# Patient Record
Sex: MALE | Race: WHITE | NOT HISPANIC OR LATINO | Employment: FULL TIME | URBAN - METROPOLITAN AREA
[De-identification: names, ages, dates, MRNs, and addresses within clinical notes are randomized per-mention and may not be internally consistent; named-entity substitution may affect disease eponyms.]

---

## 2023-04-24 ENCOUNTER — HOSPITAL ENCOUNTER (OUTPATIENT)
Dept: DATA CONVERSION | Facility: HOSPITAL | Age: 38
End: 2023-04-24
Attending: INTERNAL MEDICINE
Payer: COMMERCIAL

## 2023-04-24 DIAGNOSIS — K85.90 ACUTE PANCREATITIS WITHOUT NECROSIS OR INFECTION, UNSPECIFIED (HHS-HCC): ICD-10-CM

## 2023-04-24 DIAGNOSIS — E66.9 OBESITY, UNSPECIFIED: ICD-10-CM

## 2023-04-24 DIAGNOSIS — F32.A DEPRESSION, UNSPECIFIED: ICD-10-CM

## 2023-04-24 DIAGNOSIS — I10 ESSENTIAL (PRIMARY) HYPERTENSION: ICD-10-CM

## 2023-04-24 DIAGNOSIS — M51.16 INTERVERTEBRAL DISC DISORDERS WITH RADICULOPATHY, LUMBAR REGION: ICD-10-CM

## 2023-04-24 DIAGNOSIS — R10.13 EPIGASTRIC PAIN: ICD-10-CM

## 2023-04-24 DIAGNOSIS — F41.9 ANXIETY DISORDER, UNSPECIFIED: ICD-10-CM

## 2023-04-24 DIAGNOSIS — F17.200 NICOTINE DEPENDENCE, UNSPECIFIED, UNCOMPLICATED: ICD-10-CM

## 2023-04-24 DIAGNOSIS — G47.33 OBSTRUCTIVE SLEEP APNEA (ADULT) (PEDIATRIC): ICD-10-CM

## 2023-04-24 DIAGNOSIS — E11.9 TYPE 2 DIABETES MELLITUS WITHOUT COMPLICATIONS (MULTI): ICD-10-CM

## 2023-04-24 DIAGNOSIS — Z79.84 LONG TERM (CURRENT) USE OF ORAL HYPOGLYCEMIC DRUGS: ICD-10-CM

## 2023-04-24 LAB
POCT GLUCOSE: 124 MG/DL (ref 74–99)
POCT GLUCOSE: 153 MG/DL (ref 74–99)

## 2023-05-02 LAB
COMPLETE PATHOLOGY REPORT: NORMAL
CONVERTED CLINICAL DIAGNOSIS-HISTORY: NORMAL
CONVERTED FINAL DIAGNOSIS: NORMAL
CONVERTED FINAL REPORT PDF LINK TO COPY AND PASTE: NORMAL
CONVERTED GROSS DESCRIPTION: NORMAL

## 2023-06-06 LAB
ALBUMIN (G/DL) IN SER/PLAS: 4.5 G/DL (ref 3.4–5)
ALBUMIN (MG/L) IN URINE: 13.1 MG/L
ALBUMIN/CREATININE (UG/MG) IN URINE: 15.2 UG/MG CRT (ref 0–30)
ANION GAP IN SER/PLAS: 14 MMOL/L (ref 10–20)
CALCIUM (MG/DL) IN SER/PLAS: 10.1 MG/DL (ref 8.6–10.6)
CARBON DIOXIDE, TOTAL (MMOL/L) IN SER/PLAS: 27 MMOL/L (ref 21–32)
CHLORIDE (MMOL/L) IN SER/PLAS: 100 MMOL/L (ref 98–107)
CHOLESTEROL (MG/DL) IN SER/PLAS: 217 MG/DL (ref 0–199)
CHOLESTEROL IN HDL (MG/DL) IN SER/PLAS: 36.4 MG/DL
CHOLESTEROL/HDL RATIO: 6
CREATININE (MG/DL) IN SER/PLAS: 0.66 MG/DL (ref 0.5–1.3)
CREATININE (MG/DL) IN URINE: 86.3 MG/DL (ref 20–370)
ESTIMATED AVERAGE GLUCOSE FOR HBA1C: 183 MG/DL
GFR MALE: >90 ML/MIN/1.73M2
GLUCOSE (MG/DL) IN SER/PLAS: 119 MG/DL (ref 74–99)
HEMOGLOBIN A1C/HEMOGLOBIN TOTAL IN BLOOD: 8 %
LDL: 146 MG/DL (ref 0–99)
PHOSPHATE (MG/DL) IN SER/PLAS: 3.8 MG/DL (ref 2.5–4.9)
POTASSIUM (MMOL/L) IN SER/PLAS: 4.5 MMOL/L (ref 3.5–5.3)
SODIUM (MMOL/L) IN SER/PLAS: 136 MMOL/L (ref 136–145)
TRIGLYCERIDE (MG/DL) IN SER/PLAS: 174 MG/DL (ref 0–149)
UREA NITROGEN (MG/DL) IN SER/PLAS: 19 MG/DL (ref 6–23)
VLDL: 35 MG/DL (ref 0–40)

## 2023-06-13 ENCOUNTER — OFFICE VISIT (OUTPATIENT)
Dept: PRIMARY CARE | Facility: CLINIC | Age: 38
End: 2023-06-13
Payer: COMMERCIAL

## 2023-06-13 VITALS
OXYGEN SATURATION: 98 % | DIASTOLIC BLOOD PRESSURE: 78 MMHG | WEIGHT: 315 LBS | HEART RATE: 98 BPM | HEIGHT: 75 IN | BODY MASS INDEX: 39.17 KG/M2 | SYSTOLIC BLOOD PRESSURE: 118 MMHG

## 2023-06-13 DIAGNOSIS — E11.9 DM TYPE 2, NOT AT GOAL (MULTI): ICD-10-CM

## 2023-06-13 DIAGNOSIS — Z00.00 ANNUAL PHYSICAL EXAM: Primary | ICD-10-CM

## 2023-06-13 DIAGNOSIS — F17.210 CIGARETTE SMOKER: ICD-10-CM

## 2023-06-13 DIAGNOSIS — F41.9 ANXIETY: ICD-10-CM

## 2023-06-13 PROCEDURE — 90677 PCV20 VACCINE IM: CPT | Performed by: FAMILY MEDICINE

## 2023-06-13 PROCEDURE — 90471 IMMUNIZATION ADMIN: CPT | Performed by: FAMILY MEDICINE

## 2023-06-13 PROCEDURE — 3052F HG A1C>EQUAL 8.0%<EQUAL 9.0%: CPT | Performed by: FAMILY MEDICINE

## 2023-06-13 PROCEDURE — 3074F SYST BP LT 130 MM HG: CPT | Performed by: FAMILY MEDICINE

## 2023-06-13 PROCEDURE — 4010F ACE/ARB THERAPY RXD/TAKEN: CPT | Performed by: FAMILY MEDICINE

## 2023-06-13 PROCEDURE — 3008F BODY MASS INDEX DOCD: CPT | Performed by: FAMILY MEDICINE

## 2023-06-13 PROCEDURE — 90472 IMMUNIZATION ADMIN EACH ADD: CPT | Performed by: FAMILY MEDICINE

## 2023-06-13 PROCEDURE — 99395 PREV VISIT EST AGE 18-39: CPT | Performed by: FAMILY MEDICINE

## 2023-06-13 PROCEDURE — 3078F DIAST BP <80 MM HG: CPT | Performed by: FAMILY MEDICINE

## 2023-06-13 PROCEDURE — 90715 TDAP VACCINE 7 YRS/> IM: CPT | Performed by: FAMILY MEDICINE

## 2023-06-13 RX ORDER — INSULIN ASPART 100 [IU]/ML
INJECTION, SOLUTION INTRAVENOUS; SUBCUTANEOUS
COMMUNITY
Start: 2023-04-17 | End: 2024-02-21 | Stop reason: ENTERED-IN-ERROR

## 2023-06-13 RX ORDER — PEN NEEDLE, DIABETIC 29 G X1/2"
NEEDLE, DISPOSABLE MISCELLANEOUS
COMMUNITY
Start: 2022-11-30 | End: 2024-02-21 | Stop reason: ENTERED-IN-ERROR

## 2023-06-13 RX ORDER — LORAZEPAM 1 MG/1
1 TABLET ORAL 2 TIMES DAILY PRN
COMMUNITY
Start: 2022-07-01 | End: 2024-02-21 | Stop reason: ENTERED-IN-ERROR

## 2023-06-13 RX ORDER — BLOOD-GLUCOSE SENSOR
EACH MISCELLANEOUS
COMMUNITY
Start: 2023-06-06 | End: 2023-06-13

## 2023-06-13 RX ORDER — BLOOD-GLUCOSE METER
EACH MISCELLANEOUS
COMMUNITY
Start: 2021-08-11 | End: 2024-02-21 | Stop reason: ENTERED-IN-ERROR

## 2023-06-13 RX ORDER — PROPRANOLOL HYDROCHLORIDE 80 MG/1
TABLET ORAL
COMMUNITY
Start: 2023-02-26 | End: 2024-03-05 | Stop reason: WASHOUT

## 2023-06-13 RX ORDER — METFORMIN HYDROCHLORIDE 500 MG/1
500 TABLET, EXTENDED RELEASE ORAL 2 TIMES DAILY
COMMUNITY
Start: 2023-06-06 | End: 2023-06-13 | Stop reason: DRUGHIGH

## 2023-06-13 RX ORDER — EMPAGLIFLOZIN 10 MG/1
1 TABLET, FILM COATED ORAL DAILY
COMMUNITY
Start: 2023-01-31 | End: 2023-10-10 | Stop reason: SDUPTHER

## 2023-06-13 RX ORDER — BUPROPION HYDROCHLORIDE 150 MG/1
150 TABLET ORAL
COMMUNITY
Start: 2023-05-30 | End: 2023-06-13 | Stop reason: SDUPTHER

## 2023-06-13 RX ORDER — BUPROPION HYDROCHLORIDE 300 MG/1
300 TABLET ORAL
Qty: 90 TABLET | Refills: 3 | Status: ON HOLD | OUTPATIENT
Start: 2023-06-13 | End: 2024-02-25 | Stop reason: SDUPTHER

## 2023-06-13 RX ORDER — METFORMIN HYDROCHLORIDE 500 MG/1
1000 TABLET, EXTENDED RELEASE ORAL DAILY
Qty: 180 TABLET | Refills: 3 | Status: SHIPPED | OUTPATIENT
Start: 2023-06-13 | End: 2023-10-10 | Stop reason: SDUPTHER

## 2023-06-13 RX ORDER — LOSARTAN POTASSIUM 50 MG/1
50 TABLET ORAL DAILY
COMMUNITY
End: 2024-02-05 | Stop reason: SDUPTHER

## 2023-06-13 RX ORDER — SEMAGLUTIDE 1.34 MG/ML
1 INJECTION, SOLUTION SUBCUTANEOUS
COMMUNITY
Start: 2023-06-07 | End: 2023-10-03 | Stop reason: SDUPTHER

## 2023-06-13 ASSESSMENT — PAIN SCALES - GENERAL: PAINLEVEL: 6

## 2023-06-13 NOTE — PROGRESS NOTES
"Subjective   Patient ID: Glenroy Parker is a 37 y.o. male who presents for Annual Exam (Annual physical exam, pt is fasting. ).    HPI   Patient here for annual checkup.  He unfortunately continues to smoke. Nicotine patches in past.  Patient is seeing endocrine for his diabetes.  Last A1c 8.0.  He is not exercising regularly.  He has not had any flareups of his pancreatitis.  Review of Systems    Objective   /78 (BP Location: Left arm, Patient Position: Sitting, BP Cuff Size: Adult)   Pulse 98   Ht 1.905 m (6' 3\")   Wt (!) 150 kg (331 lb 9.6 oz)   SpO2 98%   BMI 41.45 kg/m²     Physical Exam  Alert, pleasant and in no acute distress.  Neck: Supple, no JVD or carotid bruit  Heart: Regular rate and rhythm, no murmur  Lungs: Clear to auscultation  Lower extremities: No edema  Abdomen: Soft, nontender without palpable mass    Assessment/Plan   Problem List Items Addressed This Visit    None  Visit Diagnoses       Annual physical exam    -  Primary    Anxiety        Relevant Medications    buPROPion XL (Wellbutrin XL) 300 mg 24 hr tablet    DM type 2, not at goal (CMS/[facility])        Cigarette smoker            Patient here for annual checkup.  He continues to struggle with smoking, weight loss and his diabetes.  We will increase his bupropion to 300 mg daily.  He can also utilize nicotine patches to quit.  If this fails, we can look at trying Chantix which she has tried in the past.  He had some side effects from this so we would have to dose very slowly.  Past blood work reviewed in detail.  Tdap and pneumococcal vaccines provided       "

## 2023-09-01 PROBLEM — I10 BENIGN ESSENTIAL HYPERTENSION: Status: ACTIVE | Noted: 2023-09-01

## 2023-09-01 PROBLEM — M51.16 DISPLACEMENT OF LUMBAR INTERVERTEBRAL DISC WITH RADICULOPATHY: Status: ACTIVE | Noted: 2023-09-01

## 2023-09-01 PROBLEM — H52.03 HYPERMETROPIA OF BOTH EYES: Status: ACTIVE | Noted: 2023-09-01

## 2023-09-01 PROBLEM — Z04.9 CONDITION NOT FOUND: Status: ACTIVE | Noted: 2023-09-01

## 2023-09-01 PROBLEM — E11.9 DIABETES MELLITUS, TYPE 2 (MULTI): Status: ACTIVE | Noted: 2023-09-01

## 2023-09-01 PROBLEM — R13.10 DYSPHAGIA: Status: ACTIVE | Noted: 2023-09-01

## 2023-09-01 PROBLEM — R73.09 ELEVATED GLUCOSE: Status: ACTIVE | Noted: 2023-09-01

## 2023-09-01 PROBLEM — R51.9 HEADACHE: Status: ACTIVE | Noted: 2023-09-01

## 2023-09-01 PROBLEM — G47.33 OSA (OBSTRUCTIVE SLEEP APNEA): Status: ACTIVE | Noted: 2023-09-01

## 2023-09-01 PROBLEM — S20.219A: Status: ACTIVE | Noted: 2023-09-01

## 2023-09-01 PROBLEM — M54.16 LUMBAR RADICULITIS: Status: ACTIVE | Noted: 2023-09-01

## 2023-09-01 PROBLEM — R60.0 BILATERAL LOWER EXTREMITY EDEMA: Status: ACTIVE | Noted: 2023-09-01

## 2023-09-01 PROBLEM — H70.91 MASTOIDITIS OF RIGHT SIDE: Status: ACTIVE | Noted: 2023-09-01

## 2023-09-01 PROBLEM — H69.93 DYSFUNCTION OF BOTH EUSTACHIAN TUBES: Status: ACTIVE | Noted: 2023-09-01

## 2023-09-01 PROBLEM — M51.26 HERNIATED NUCLEUS PULPOSUS, L4-5 LEFT: Status: ACTIVE | Noted: 2023-09-01

## 2023-09-01 PROBLEM — L03.116 CELLULITIS OF LEG, LEFT: Status: ACTIVE | Noted: 2023-09-01

## 2023-09-01 PROBLEM — I10 HTN (HYPERTENSION): Status: ACTIVE | Noted: 2023-09-01

## 2023-09-01 PROBLEM — K21.9 GASTROESOPHAGEAL REFLUX DISEASE: Status: ACTIVE | Noted: 2023-09-01

## 2023-09-01 PROBLEM — E11.9 DIABETES MELLITUS TYPE 2 WITHOUT RETINOPATHY (MULTI): Status: ACTIVE | Noted: 2023-09-01

## 2023-09-01 PROBLEM — S30.0XXA LUMBAR CONTUSION: Status: ACTIVE | Noted: 2023-09-01

## 2023-09-01 PROBLEM — I87.2 VENOUS INSUFFICIENCY: Status: ACTIVE | Noted: 2023-09-01

## 2023-09-01 PROBLEM — R74.8 ELEVATED LIVER ENZYMES: Status: ACTIVE | Noted: 2023-09-01

## 2023-09-01 PROBLEM — F41.8 MIXED ANXIETY DEPRESSIVE DISORDER: Status: ACTIVE | Noted: 2023-09-01

## 2023-09-01 PROBLEM — F41.0 PANIC ATTACKS: Status: ACTIVE | Noted: 2023-09-01

## 2023-09-01 PROBLEM — G44.009 CLUSTER HEADACHE: Status: ACTIVE | Noted: 2023-09-01

## 2023-09-01 PROBLEM — H52.203 ASTIGMATISM OF BOTH EYES: Status: ACTIVE | Noted: 2023-09-01

## 2023-09-01 PROBLEM — R41.840 DIFFICULTY CONCENTRATING: Status: ACTIVE | Noted: 2023-09-01

## 2023-09-01 PROBLEM — R82.90 MALODOROUS URINE: Status: ACTIVE | Noted: 2023-09-01

## 2023-09-01 PROBLEM — Z87.898 H/O ABDOMINAL ABSCESS: Status: ACTIVE | Noted: 2023-09-01

## 2023-09-01 PROBLEM — S60.222A CONTUSION OF HAND, LEFT: Status: ACTIVE | Noted: 2023-09-01

## 2023-09-01 PROBLEM — Z98.890 STATUS POST PHLEBECTOMY: Status: ACTIVE | Noted: 2023-09-01

## 2023-09-01 RX ORDER — PANTOPRAZOLE SODIUM 40 MG/1
1 TABLET, DELAYED RELEASE ORAL DAILY
COMMUNITY
Start: 2022-06-14 | End: 2024-02-21 | Stop reason: ENTERED-IN-ERROR

## 2023-09-01 RX ORDER — DESVENLAFAXINE 100 MG/1
1 TABLET, EXTENDED RELEASE ORAL DAILY
COMMUNITY
End: 2024-02-21 | Stop reason: ENTERED-IN-ERROR

## 2023-09-01 RX ORDER — ARIPIPRAZOLE 2 MG/1
1 TABLET ORAL DAILY
COMMUNITY
End: 2024-02-21 | Stop reason: ENTERED-IN-ERROR

## 2023-09-01 RX ORDER — METFORMIN HYDROCHLORIDE 500 MG/1
1 TABLET ORAL 2 TIMES DAILY
COMMUNITY
End: 2023-10-10 | Stop reason: ALTCHOICE

## 2023-09-01 RX ORDER — INSULIN ASPART 100 [IU]/ML
20 INJECTION, SUSPENSION SUBCUTANEOUS
COMMUNITY
Start: 2022-12-06 | End: 2023-10-10 | Stop reason: ALTCHOICE

## 2023-09-01 RX ORDER — LANCETS 33 GAUGE
EACH MISCELLANEOUS 3 TIMES DAILY
COMMUNITY
End: 2024-02-21 | Stop reason: ENTERED-IN-ERROR

## 2023-09-01 RX ORDER — HYDROXYZINE HYDROCHLORIDE 25 MG/1
1 TABLET, FILM COATED ORAL 3 TIMES DAILY PRN
COMMUNITY
End: 2024-03-05 | Stop reason: WASHOUT

## 2023-09-19 DIAGNOSIS — U07.1 COVID-19: Primary | ICD-10-CM

## 2023-09-19 RX ORDER — NIRMATRELVIR AND RITONAVIR 300-100 MG
KIT ORAL
Qty: 30 TABLET | Refills: 0 | Status: SHIPPED | OUTPATIENT
Start: 2023-09-19 | End: 2024-02-25 | Stop reason: HOSPADM

## 2023-10-03 ENCOUNTER — TELEPHONE (OUTPATIENT)
Dept: ENDOCRINOLOGY | Facility: CLINIC | Age: 38
End: 2023-10-03
Payer: COMMERCIAL

## 2023-10-03 ENCOUNTER — PHARMACY VISIT (OUTPATIENT)
Dept: PHARMACY | Facility: CLINIC | Age: 38
End: 2023-10-03
Payer: MEDICARE

## 2023-10-03 DIAGNOSIS — E11.65 TYPE 2 DIABETES MELLITUS WITH HYPERGLYCEMIA, WITH LONG-TERM CURRENT USE OF INSULIN (MULTI): Primary | ICD-10-CM

## 2023-10-03 DIAGNOSIS — Z79.4 TYPE 2 DIABETES MELLITUS WITH HYPERGLYCEMIA, WITH LONG-TERM CURRENT USE OF INSULIN (MULTI): Primary | ICD-10-CM

## 2023-10-03 PROCEDURE — RXMED WILLOW AMBULATORY MEDICATION CHARGE

## 2023-10-03 RX ORDER — SEMAGLUTIDE 1.34 MG/ML
1 INJECTION, SOLUTION SUBCUTANEOUS
Qty: 3 ML | Refills: 3 | Status: SHIPPED | OUTPATIENT
Start: 2023-10-03 | End: 2023-10-09 | Stop reason: SINTOL

## 2023-10-10 ENCOUNTER — LAB (OUTPATIENT)
Dept: LAB | Facility: LAB | Age: 38
End: 2023-10-10
Payer: COMMERCIAL

## 2023-10-10 ENCOUNTER — OFFICE VISIT (OUTPATIENT)
Dept: ENDOCRINOLOGY | Facility: CLINIC | Age: 38
End: 2023-10-10
Payer: COMMERCIAL

## 2023-10-10 VITALS
DIASTOLIC BLOOD PRESSURE: 86 MMHG | TEMPERATURE: 98.3 F | RESPIRATION RATE: 18 BRPM | HEIGHT: 75 IN | SYSTOLIC BLOOD PRESSURE: 133 MMHG | WEIGHT: 315 LBS | BODY MASS INDEX: 39.17 KG/M2 | HEART RATE: 90 BPM

## 2023-10-10 DIAGNOSIS — Z79.4 TYPE 2 DIABETES MELLITUS WITH HYPERGLYCEMIA, WITH LONG-TERM CURRENT USE OF INSULIN (MULTI): Primary | ICD-10-CM

## 2023-10-10 DIAGNOSIS — E11.65 TYPE 2 DIABETES MELLITUS WITH HYPERGLYCEMIA, WITH LONG-TERM CURRENT USE OF INSULIN (MULTI): Primary | ICD-10-CM

## 2023-10-10 DIAGNOSIS — Z79.4 TYPE 2 DIABETES MELLITUS WITH HYPERGLYCEMIA, WITH LONG-TERM CURRENT USE OF INSULIN (MULTI): ICD-10-CM

## 2023-10-10 DIAGNOSIS — E11.65 TYPE 2 DIABETES MELLITUS WITH HYPERGLYCEMIA, WITH LONG-TERM CURRENT USE OF INSULIN (MULTI): ICD-10-CM

## 2023-10-10 LAB
EST. AVERAGE GLUCOSE BLD GHB EST-MCNC: 154 MG/DL
HBA1C MFR BLD: 7 %

## 2023-10-10 PROCEDURE — 83036 HEMOGLOBIN GLYCOSYLATED A1C: CPT

## 2023-10-10 PROCEDURE — 3008F BODY MASS INDEX DOCD: CPT | Performed by: STUDENT IN AN ORGANIZED HEALTH CARE EDUCATION/TRAINING PROGRAM

## 2023-10-10 PROCEDURE — 80061 LIPID PANEL: CPT

## 2023-10-10 PROCEDURE — 3052F HG A1C>EQUAL 8.0%<EQUAL 9.0%: CPT | Performed by: STUDENT IN AN ORGANIZED HEALTH CARE EDUCATION/TRAINING PROGRAM

## 2023-10-10 PROCEDURE — 4010F ACE/ARB THERAPY RXD/TAKEN: CPT | Performed by: STUDENT IN AN ORGANIZED HEALTH CARE EDUCATION/TRAINING PROGRAM

## 2023-10-10 PROCEDURE — 99213 OFFICE O/P EST LOW 20 MIN: CPT | Performed by: STUDENT IN AN ORGANIZED HEALTH CARE EDUCATION/TRAINING PROGRAM

## 2023-10-10 PROCEDURE — 3079F DIAST BP 80-89 MM HG: CPT | Performed by: STUDENT IN AN ORGANIZED HEALTH CARE EDUCATION/TRAINING PROGRAM

## 2023-10-10 PROCEDURE — 82043 UR ALBUMIN QUANTITATIVE: CPT

## 2023-10-10 PROCEDURE — 36415 COLL VENOUS BLD VENIPUNCTURE: CPT

## 2023-10-10 PROCEDURE — 84100 ASSAY OF PHOSPHORUS: CPT

## 2023-10-10 PROCEDURE — 3075F SYST BP GE 130 - 139MM HG: CPT | Performed by: STUDENT IN AN ORGANIZED HEALTH CARE EDUCATION/TRAINING PROGRAM

## 2023-10-10 PROCEDURE — 82570 ASSAY OF URINE CREATININE: CPT

## 2023-10-10 PROCEDURE — 80053 COMPREHEN METABOLIC PANEL: CPT

## 2023-10-10 PROCEDURE — 82248 BILIRUBIN DIRECT: CPT

## 2023-10-10 RX ORDER — METFORMIN HYDROCHLORIDE 500 MG/1
1000 TABLET, EXTENDED RELEASE ORAL DAILY
Qty: 180 TABLET | Refills: 3 | Status: SHIPPED | OUTPATIENT
Start: 2023-10-10 | End: 2024-04-11 | Stop reason: SDUPTHER

## 2023-10-10 RX ORDER — SEMAGLUTIDE 1.34 MG/ML
1 INJECTION, SOLUTION SUBCUTANEOUS
Qty: 1 EACH | Refills: 2 | Status: SHIPPED | OUTPATIENT
Start: 2023-10-10 | End: 2024-04-11 | Stop reason: SDUPTHER

## 2023-10-10 RX ORDER — EMPAGLIFLOZIN 10 MG/1
10 TABLET, FILM COATED ORAL DAILY
Qty: 90 TABLET | Refills: 2 | Status: SHIPPED | OUTPATIENT
Start: 2023-10-10 | End: 2024-04-11 | Stop reason: SDUPTHER

## 2023-10-10 RX ORDER — BLOOD-GLUCOSE SENSOR
3 EACH MISCELLANEOUS
Qty: 9 EACH | Refills: 2 | Status: SHIPPED | OUTPATIENT
Start: 2023-10-10 | End: 2024-03-07 | Stop reason: WASHOUT

## 2023-10-10 NOTE — PATIENT INSTRUCTIONS
Get your bloodwork done    If A1c is high, then we will increase your ozempic dose     Follow up in 6 months    Funmilayo Hines MD  Divison of Endocrinology   [facility]   Phone: [phone]    option 4, then option 1  Fax: [phone]

## 2023-10-10 NOTE — PROGRESS NOTES
36-year old male PMH: KELLY, gallstone pancreatitis, HTN, Obesity, with diabetes coming in today for DM follow up      Duration of type 2 diabetes: dx 2018   Complicated by neuropathy, albuminuria    Interval: ran out of ozempic for about 1 month, was on the 1mg for one month before supply ran out      Current Regimen   Novolog Mix 70/30-weaned himself off   MTF 1g daily   jardiance 10mg daily   Ozempic 1mg   Glipizide-dced     History of pancreatitis attributed to etoh, seeing GI, no gallstone on work up and GI wants GLP1RA for weight loss    SMBG- has dexcom G7 but having issues with sensor      Diet-eating less with ozempic   sedentary lifestyle      Screening and Comorbidities   Last eye exam October 2022   foot exam 11/2022  Obesity   HLD- no statin   on Losartan      PMH: as above   Fmxh: non contributory  Social: works in a bank, rare etoh     Weight loss  No abdominal pain   12 Point ROS reviewed and is negative, pertinent findings noted on HPI  Physical Exam  Constitutional:       Appearance: Normal appearance.   Cardiovascular:      Rate and Rhythm: Normal rate.   Abdominal:      Palpations: Abdomen is soft.      Comments: Abdominal obesity   Musculoskeletal:         General: Normal range of motion.      Cervical back: Normal range of motion and neck supple.   Skin:     General: Skin is warm.      Comments: No lipodystrophy   Neurological:      Mental Status: He is alert.         labs and imaging reviewed, pertinent findings listed on HPI and Impression    Problem List Items Addressed This Visit       Diabetes mellitus, type 2 (CMS/HCC) - Primary    Relevant Medications    Jardiance 10 mg    blood-glucose sensor (Dexcom G7 Sensor) device    metFORMIN  mg 24 hr tablet    semaglutide (Ozempic) 1 mg/dose (2 mg/1.5 mL) pen injector    Other Relevant Orders    Albumin , Urine Random    Hemoglobin A1C    Lipid Panel    Renal Function Panel    Hepatic Function Panel     DM2, with neuropathy,DR and  albuminuria     Overall improving as he is also able to get off insulin    Been on GLP1 for many months, we will recheck lipid profile now along with rest of diabetes screening labs to see the effect. If persistently LDL not at goal need to start statin     If A1c still not at goal then we can increase ozempic to 2mg    Has a hx of pancreatitis attributed to etoh, GI ok with GLP1 to improve metabolic profile     Continue jardiance and metformin     Time spent  Coordination of care and Plan communicated to PCP electronically via EMR  Total time spent 29 min in this encounter including chart review, coordination of care, history physical exam, counseling and placing lab orders

## 2023-10-11 LAB
ALBUMIN SERPL BCP-MCNC: 4.3 G/DL (ref 3.4–5)
ALP SERPL-CCNC: 66 U/L (ref 33–120)
ALT SERPL W P-5'-P-CCNC: 28 U/L (ref 10–52)
ANION GAP SERPL CALC-SCNC: 16 MMOL/L (ref 10–20)
AST SERPL W P-5'-P-CCNC: 15 U/L (ref 9–39)
BILIRUB DIRECT SERPL-MCNC: 0.1 MG/DL (ref 0–0.3)
BILIRUB SERPL-MCNC: 0.4 MG/DL (ref 0–1.2)
BUN SERPL-MCNC: 18 MG/DL (ref 6–23)
CALCIUM SERPL-MCNC: 9.5 MG/DL (ref 8.6–10.6)
CHLORIDE SERPL-SCNC: 104 MMOL/L (ref 98–107)
CHOLEST SERPL-MCNC: 196 MG/DL (ref 0–199)
CHOLESTEROL/HDL RATIO: 4.8
CO2 SERPL-SCNC: 26 MMOL/L (ref 21–32)
CREAT SERPL-MCNC: 0.62 MG/DL (ref 0.5–1.3)
CREAT UR-MCNC: 55.7 MG/DL (ref 20–370)
GFR SERPL CREATININE-BSD FRML MDRD: >90 ML/MIN/1.73M*2
GLUCOSE SERPL-MCNC: 131 MG/DL (ref 74–99)
HDLC SERPL-MCNC: 41.2 MG/DL
LDLC SERPL CALC-MCNC: 132 MG/DL (ref 130–180)
MICROALBUMIN UR-MCNC: 12.7 MG/L
MICROALBUMIN/CREAT UR: 22.8 UG/MG CREAT
NON HDL CHOLESTEROL: 155 MG/DL (ref 0–149)
PHOSPHATE SERPL-MCNC: 3.6 MG/DL (ref 2.5–4.9)
POTASSIUM SERPL-SCNC: 4.3 MMOL/L (ref 3.5–5.3)
PROT SERPL-MCNC: 7.8 G/DL (ref 6.4–8.2)
SODIUM SERPL-SCNC: 142 MMOL/L (ref 136–145)
TRIGL SERPL-MCNC: 114 MG/DL (ref 0–149)
VLDL: 23 MG/DL (ref 0–40)

## 2023-10-26 DIAGNOSIS — F41.9 ANXIETY: Primary | ICD-10-CM

## 2023-10-27 DIAGNOSIS — F41.9 ANXIETY: Primary | ICD-10-CM

## 2024-01-10 ENCOUNTER — ANCILLARY PROCEDURE (OUTPATIENT)
Dept: RADIOLOGY | Facility: CLINIC | Age: 39
End: 2024-01-10
Payer: COMMERCIAL

## 2024-01-10 DIAGNOSIS — R05.1 ACUTE COUGH: ICD-10-CM

## 2024-01-10 PROCEDURE — 71046 X-RAY EXAM CHEST 2 VIEWS: CPT

## 2024-01-10 PROCEDURE — 71046 X-RAY EXAM CHEST 2 VIEWS: CPT | Mod: FOREIGN READ | Performed by: RADIOLOGY

## 2024-02-05 DIAGNOSIS — I10 BENIGN ESSENTIAL HYPERTENSION: Primary | ICD-10-CM

## 2024-02-05 DIAGNOSIS — E11.65 TYPE 2 DIABETES MELLITUS WITH HYPERGLYCEMIA, WITH LONG-TERM CURRENT USE OF INSULIN (MULTI): ICD-10-CM

## 2024-02-05 DIAGNOSIS — Z79.4 TYPE 2 DIABETES MELLITUS WITH HYPERGLYCEMIA, WITH LONG-TERM CURRENT USE OF INSULIN (MULTI): ICD-10-CM

## 2024-02-05 RX ORDER — LOSARTAN POTASSIUM 50 MG/1
50 TABLET ORAL DAILY
Qty: 90 TABLET | Refills: 0 | Status: SHIPPED | OUTPATIENT
Start: 2024-02-05

## 2024-02-12 DIAGNOSIS — F41.9 ANXIETY: Primary | ICD-10-CM

## 2024-02-12 RX ORDER — BUPROPION HYDROCHLORIDE 150 MG/1
150 TABLET ORAL DAILY
Qty: 30 TABLET | Refills: 2 | Status: ON HOLD | OUTPATIENT
Start: 2024-02-12 | End: 2024-02-25 | Stop reason: SDUPTHER

## 2024-02-13 ENCOUNTER — E-CONSULT (OUTPATIENT)
Dept: BEHAVIORAL HEALTH | Facility: CLINIC | Age: 39
End: 2024-02-13
Payer: COMMERCIAL

## 2024-02-13 NOTE — PROGRESS NOTES
E-Consult note:     Summary of data review: Pt is a 37yo male with h/o anxiety, KELLY, HTN, and obesity, currently on bupropion and recently referred for psychotherapy.  [facility].    Last documentation of anxiety addressed is 6/13/2023 March 2023: pt reported depression, anxiety, and poor sleep on questionnaire (see scanned document)  No KAROL-7s or PHQ-9s seen in Synopsis or Flowsheets  Subst use: developed acute pancreatitis in 2022 from regular excessive alcohol    PPH:  Therapy: h/o working with therapist, none currently  Current meds: bupropion XL 300mg daily x 9 months  Past meds: unclear h/o aripiprazole up to 5mg; desvenlafaxine up to 100mg; lorazepam; hydroxyzyine and propranolol for panic prescribed in 9/2022; clonazeapm up to 1mg; nicotine replacement patch; duloxetine up to 30mg; fluoxetine up to 60mg; lamotrigine (unclear whether as AED or mood stabilizer); mirtazapine up to 7.5mg; sertraline up to 50mg;     FHx: father-- alcoholism    Findings / recommendations:   After careful review of the patient's information available in the medical record, the following are my findings and recommendations:     -- Unclear response to current or past meds.  Dosing of past medications not optimized and unclear why.  -- Would recommend starting periodic KAROL-7 and PHQ-9 to provide more objective measure of response to medication  -- SSRIs still considered first-line Rx for anxiety disorders.  Would consider retrial of sertraline or duloxetine.  Anxiety typically requires higher dosing and for longer periods of time before response is noticed.         - if opting for sertraline, start at 50mg daily x 2 weeks, then increase to 50mg daily x 2 weeks, then increase to 100mg daily.  Continue 100mg daily x 3-4 weeks and re-evaluate: if no significant improvement in symptoms, increase to 150mg daily.  Then re-evaluate after 4 weeks: if no improvement, may increase to 200mg daily.      - if opting for  duloxetine, start at 30mg daily x 2 weeks, then increase to 60mg.  Continue 60mg daily x 3-4 weeks and re-evaluate: if no significant improvement in symptoms, increase to 90mg daily.  Then re-evaluate after 4 weeks: if no improvement, may increase to 120mg daily.    -- would consider tapering off of bupropion if not improvement in depressive sx has been noted.  Bupropion has worsened anxiety symptoms in some patients.  -- encourage pt to follow through with psychology referral    Thank you for the consultation.     eConsult Time: 30 minutes      Kayli Christian MD      2/13/2024

## 2024-02-20 ENCOUNTER — APPOINTMENT (OUTPATIENT)
Dept: RADIOLOGY | Facility: HOSPITAL | Age: 39
DRG: 392 | End: 2024-02-20
Payer: COMMERCIAL

## 2024-02-20 LAB
ALBUMIN SERPL BCP-MCNC: 4.3 G/DL (ref 3.4–5)
ALP SERPL-CCNC: 73 U/L (ref 33–120)
ALT SERPL W P-5'-P-CCNC: 31 U/L (ref 10–52)
ANION GAP SERPL CALC-SCNC: 15 MMOL/L (ref 10–20)
AST SERPL W P-5'-P-CCNC: 16 U/L (ref 9–39)
BASOPHILS # BLD AUTO: 0.12 X10*3/UL (ref 0–0.1)
BASOPHILS NFR BLD AUTO: 0.6 %
BILIRUB SERPL-MCNC: 0.4 MG/DL (ref 0–1.2)
BUN SERPL-MCNC: 23 MG/DL (ref 6–23)
CALCIUM SERPL-MCNC: 9.1 MG/DL (ref 8.6–10.3)
CHLORIDE SERPL-SCNC: 94 MMOL/L (ref 98–107)
CO2 SERPL-SCNC: 24 MMOL/L (ref 21–32)
CREAT SERPL-MCNC: 0.84 MG/DL (ref 0.5–1.3)
EGFRCR SERPLBLD CKD-EPI 2021: >90 ML/MIN/1.73M*2
EOSINOPHIL # BLD AUTO: 0.1 X10*3/UL (ref 0–0.7)
EOSINOPHIL NFR BLD AUTO: 0.5 %
ERYTHROCYTE [DISTWIDTH] IN BLOOD BY AUTOMATED COUNT: 14.2 % (ref 11.5–14.5)
GLUCOSE SERPL-MCNC: 289 MG/DL (ref 74–99)
HCT VFR BLD AUTO: 46.3 % (ref 41–52)
HGB BLD-MCNC: 14.8 G/DL (ref 13.5–17.5)
IMM GRANULOCYTES # BLD AUTO: 0.12 X10*3/UL (ref 0–0.7)
IMM GRANULOCYTES NFR BLD AUTO: 0.6 % (ref 0–0.9)
LIPASE SERPL-CCNC: 10 U/L (ref 9–82)
LYMPHOCYTES # BLD AUTO: 1.38 X10*3/UL (ref 1.2–4.8)
LYMPHOCYTES NFR BLD AUTO: 7 %
MCH RBC QN AUTO: 26.3 PG (ref 26–34)
MCHC RBC AUTO-ENTMCNC: 32 G/DL (ref 32–36)
MCV RBC AUTO: 82 FL (ref 80–100)
MONOCYTES # BLD AUTO: 1.36 X10*3/UL (ref 0.1–1)
MONOCYTES NFR BLD AUTO: 6.9 %
NEUTROPHILS # BLD AUTO: 16.55 X10*3/UL (ref 1.2–7.7)
NEUTROPHILS NFR BLD AUTO: 84.4 %
NRBC BLD-RTO: 0 /100 WBCS (ref 0–0)
PLATELET # BLD AUTO: 295 X10*3/UL (ref 150–450)
POTASSIUM SERPL-SCNC: 4.3 MMOL/L (ref 3.5–5.3)
PROT SERPL-MCNC: 7.9 G/DL (ref 6.4–8.2)
RBC # BLD AUTO: 5.62 X10*6/UL (ref 4.5–5.9)
SODIUM SERPL-SCNC: 129 MMOL/L (ref 136–145)
WBC # BLD AUTO: 19.6 X10*3/UL (ref 4.4–11.3)

## 2024-02-20 PROCEDURE — 36415 COLL VENOUS BLD VENIPUNCTURE: CPT | Performed by: STUDENT IN AN ORGANIZED HEALTH CARE EDUCATION/TRAINING PROGRAM

## 2024-02-20 PROCEDURE — 83690 ASSAY OF LIPASE: CPT | Performed by: STUDENT IN AN ORGANIZED HEALTH CARE EDUCATION/TRAINING PROGRAM

## 2024-02-20 PROCEDURE — 96376 TX/PRO/DX INJ SAME DRUG ADON: CPT

## 2024-02-20 PROCEDURE — 76705 ECHO EXAM OF ABDOMEN: CPT

## 2024-02-20 PROCEDURE — 99285 EMERGENCY DEPT VISIT HI MDM: CPT | Mod: 25

## 2024-02-20 PROCEDURE — 96375 TX/PRO/DX INJ NEW DRUG ADDON: CPT

## 2024-02-20 PROCEDURE — 96374 THER/PROPH/DIAG INJ IV PUSH: CPT

## 2024-02-20 PROCEDURE — 85025 COMPLETE CBC W/AUTO DIFF WBC: CPT | Performed by: STUDENT IN AN ORGANIZED HEALTH CARE EDUCATION/TRAINING PROGRAM

## 2024-02-20 PROCEDURE — 71045 X-RAY EXAM CHEST 1 VIEW: CPT

## 2024-02-20 PROCEDURE — 80053 COMPREHEN METABOLIC PANEL: CPT | Performed by: STUDENT IN AN ORGANIZED HEALTH CARE EDUCATION/TRAINING PROGRAM

## 2024-02-20 ASSESSMENT — PAIN SCALES - GENERAL: PAINLEVEL_OUTOF10: 5 - MODERATE PAIN

## 2024-02-20 ASSESSMENT — PAIN - FUNCTIONAL ASSESSMENT: PAIN_FUNCTIONAL_ASSESSMENT: 0-10

## 2024-02-20 ASSESSMENT — PAIN DESCRIPTION - LOCATION: LOCATION: ABDOMEN

## 2024-02-21 ENCOUNTER — APPOINTMENT (OUTPATIENT)
Dept: CARDIOLOGY | Facility: HOSPITAL | Age: 39
DRG: 392 | End: 2024-02-21
Payer: COMMERCIAL

## 2024-02-21 ENCOUNTER — APPOINTMENT (OUTPATIENT)
Dept: RADIOLOGY | Facility: HOSPITAL | Age: 39
DRG: 392 | End: 2024-02-21
Payer: COMMERCIAL

## 2024-02-21 ENCOUNTER — HOSPITAL ENCOUNTER (INPATIENT)
Facility: HOSPITAL | Age: 39
LOS: 4 days | Discharge: HOME | DRG: 392 | End: 2024-02-25
Attending: EMERGENCY MEDICINE | Admitting: INTERNAL MEDICINE
Payer: COMMERCIAL

## 2024-02-21 DIAGNOSIS — R10.13 EPIGASTRIC ABDOMINAL PAIN: ICD-10-CM

## 2024-02-21 DIAGNOSIS — R10.84 GENERALIZED ABDOMINAL PAIN: Primary | ICD-10-CM

## 2024-02-21 DIAGNOSIS — F41.9 ANXIETY: ICD-10-CM

## 2024-02-21 DIAGNOSIS — D72.829 LEUKOCYTOSIS, UNSPECIFIED TYPE: ICD-10-CM

## 2024-02-21 LAB
AMPHETAMINES UR QL SCN: ABNORMAL
ANION GAP BLDV CALCULATED.4IONS-SCNC: 10 MMOL/L (ref 10–25)
ANION GAP SERPL CALC-SCNC: 14 MMOL/L (ref 10–20)
BARBITURATES UR QL SCN: ABNORMAL
BASE EXCESS BLDV CALC-SCNC: -2.4 MMOL/L (ref -2–3)
BENZODIAZ UR QL SCN: ABNORMAL
BODY TEMPERATURE: 37 DEGREES CELSIUS
BUN SERPL-MCNC: 20 MG/DL (ref 6–23)
BZE UR QL SCN: ABNORMAL
CA-I BLDV-SCNC: 1.09 MMOL/L (ref 1.1–1.33)
CALCIUM SERPL-MCNC: 8.3 MG/DL (ref 8.6–10.3)
CANNABINOIDS UR QL SCN: ABNORMAL
CHLORIDE BLDV-SCNC: 102 MMOL/L (ref 98–107)
CHLORIDE SERPL-SCNC: 100 MMOL/L (ref 98–107)
CO2 SERPL-SCNC: 23 MMOL/L (ref 21–32)
CREAT SERPL-MCNC: 0.69 MG/DL (ref 0.5–1.3)
EGFRCR SERPLBLD CKD-EPI 2021: >90 ML/MIN/1.73M*2
ERYTHROCYTE [DISTWIDTH] IN BLOOD BY AUTOMATED COUNT: 14.3 % (ref 11.5–14.5)
FENTANYL+NORFENTANYL UR QL SCN: ABNORMAL
GLUCOSE BLD MANUAL STRIP-MCNC: 174 MG/DL (ref 74–99)
GLUCOSE BLD MANUAL STRIP-MCNC: 192 MG/DL (ref 74–99)
GLUCOSE BLD MANUAL STRIP-MCNC: 234 MG/DL (ref 74–99)
GLUCOSE BLDV-MCNC: 244 MG/DL (ref 74–99)
GLUCOSE SERPL-MCNC: 202 MG/DL (ref 74–99)
HCO3 BLDV-SCNC: 21.6 MMOL/L (ref 22–26)
HCT VFR BLD AUTO: 46.7 % (ref 41–52)
HCT VFR BLD EST: 40 % (ref 41–52)
HGB BLD-MCNC: 14.9 G/DL (ref 13.5–17.5)
HGB BLDV-MCNC: 13.2 G/DL (ref 13.5–17.5)
HOLD SPECIMEN: NORMAL
INHALED O2 CONCENTRATION: 21 %
LACTATE BLDV-SCNC: 1.6 MMOL/L (ref 0.4–2)
MCH RBC QN AUTO: 26.8 PG (ref 26–34)
MCHC RBC AUTO-ENTMCNC: 31.9 G/DL (ref 32–36)
MCV RBC AUTO: 84 FL (ref 80–100)
NRBC BLD-RTO: 0 /100 WBCS (ref 0–0)
OPIATES UR QL SCN: ABNORMAL
OXYCODONE+OXYMORPHONE UR QL SCN: ABNORMAL
OXYHGB MFR BLDV: 94.5 % (ref 45–75)
PCO2 BLDV: 34 MM HG (ref 41–51)
PCP UR QL SCN: ABNORMAL
PH BLDV: 7.41 PH (ref 7.33–7.43)
PLATELET # BLD AUTO: 247 X10*3/UL (ref 150–450)
PO2 BLDV: 149 MM HG (ref 35–45)
POTASSIUM BLDV-SCNC: 3.9 MMOL/L (ref 3.5–5.3)
POTASSIUM SERPL-SCNC: 3.9 MMOL/L (ref 3.5–5.3)
RBC # BLD AUTO: 5.55 X10*6/UL (ref 4.5–5.9)
SAO2 % BLDV: 99 % (ref 45–75)
SODIUM BLDV-SCNC: 130 MMOL/L (ref 136–145)
SODIUM SERPL-SCNC: 133 MMOL/L (ref 136–145)
WBC # BLD AUTO: 16.5 X10*3/UL (ref 4.4–11.3)

## 2024-02-21 PROCEDURE — 2500000004 HC RX 250 GENERAL PHARMACY W/ HCPCS (ALT 636 FOR OP/ED): Performed by: PHYSICIAN ASSISTANT

## 2024-02-21 PROCEDURE — 2550000001 HC RX 255 CONTRASTS: Performed by: PHYSICIAN ASSISTANT

## 2024-02-21 PROCEDURE — 84132 ASSAY OF SERUM POTASSIUM: CPT | Performed by: EMERGENCY MEDICINE

## 2024-02-21 PROCEDURE — 74177 CT ABD & PELVIS W/CONTRAST: CPT

## 2024-02-21 PROCEDURE — 71045 X-RAY EXAM CHEST 1 VIEW: CPT | Mod: FOREIGN READ | Performed by: RADIOLOGY

## 2024-02-21 PROCEDURE — 82947 ASSAY GLUCOSE BLOOD QUANT: CPT

## 2024-02-21 PROCEDURE — 76705 ECHO EXAM OF ABDOMEN: CPT | Mod: FOREIGN READ | Performed by: RADIOLOGY

## 2024-02-21 PROCEDURE — 36415 COLL VENOUS BLD VENIPUNCTURE: CPT | Performed by: EMERGENCY MEDICINE

## 2024-02-21 PROCEDURE — 99223 1ST HOSP IP/OBS HIGH 75: CPT | Performed by: INTERNAL MEDICINE

## 2024-02-21 PROCEDURE — 93005 ELECTROCARDIOGRAM TRACING: CPT

## 2024-02-21 PROCEDURE — 2500000002 HC RX 250 W HCPCS SELF ADMINISTERED DRUGS (ALT 637 FOR MEDICARE OP, ALT 636 FOR OP/ED): Performed by: INTERNAL MEDICINE

## 2024-02-21 PROCEDURE — 84132 ASSAY OF SERUM POTASSIUM: CPT | Performed by: INTERNAL MEDICINE

## 2024-02-21 PROCEDURE — 2500000004 HC RX 250 GENERAL PHARMACY W/ HCPCS (ALT 636 FOR OP/ED): Performed by: INTERNAL MEDICINE

## 2024-02-21 PROCEDURE — 2550000001 HC RX 255 CONTRASTS: Performed by: EMERGENCY MEDICINE

## 2024-02-21 PROCEDURE — 85027 COMPLETE CBC AUTOMATED: CPT | Performed by: INTERNAL MEDICINE

## 2024-02-21 PROCEDURE — 99222 1ST HOSP IP/OBS MODERATE 55: CPT | Performed by: INTERNAL MEDICINE

## 2024-02-21 PROCEDURE — 1100000001 HC PRIVATE ROOM DAILY

## 2024-02-21 PROCEDURE — 2500000001 HC RX 250 WO HCPCS SELF ADMINISTERED DRUGS (ALT 637 FOR MEDICARE OP): Performed by: INTERNAL MEDICINE

## 2024-02-21 PROCEDURE — 80307 DRUG TEST PRSMV CHEM ANLYZR: CPT | Performed by: PHYSICIAN ASSISTANT

## 2024-02-21 PROCEDURE — 2500000005 HC RX 250 GENERAL PHARMACY W/O HCPCS: Performed by: EMERGENCY MEDICINE

## 2024-02-21 PROCEDURE — 74177 CT ABD & PELVIS W/CONTRAST: CPT | Mod: FOREIGN READ | Performed by: RADIOLOGY

## 2024-02-21 PROCEDURE — 36415 COLL VENOUS BLD VENIPUNCTURE: CPT | Performed by: INTERNAL MEDICINE

## 2024-02-21 RX ORDER — ENOXAPARIN SODIUM 100 MG/ML
40 INJECTION SUBCUTANEOUS EVERY 12 HOURS SCHEDULED
Status: DISCONTINUED | OUTPATIENT
Start: 2024-02-21 | End: 2024-02-25 | Stop reason: HOSPADM

## 2024-02-21 RX ORDER — TALC
3 POWDER (GRAM) TOPICAL DAILY
Status: DISCONTINUED | OUTPATIENT
Start: 2024-02-21 | End: 2024-02-25 | Stop reason: HOSPADM

## 2024-02-21 RX ORDER — MORPHINE SULFATE 4 MG/ML
4 INJECTION, SOLUTION INTRAMUSCULAR; INTRAVENOUS 4 TIMES DAILY PRN
Status: DISCONTINUED | OUTPATIENT
Start: 2024-02-21 | End: 2024-02-21

## 2024-02-21 RX ORDER — KETOROLAC TROMETHAMINE 30 MG/ML
15 INJECTION, SOLUTION INTRAMUSCULAR; INTRAVENOUS ONCE
Status: COMPLETED | OUTPATIENT
Start: 2024-02-21 | End: 2024-02-21

## 2024-02-21 RX ORDER — ONDANSETRON HYDROCHLORIDE 2 MG/ML
4 INJECTION, SOLUTION INTRAVENOUS EVERY 8 HOURS PRN
Status: DISCONTINUED | OUTPATIENT
Start: 2024-02-21 | End: 2024-02-25 | Stop reason: HOSPADM

## 2024-02-21 RX ORDER — ONDANSETRON HYDROCHLORIDE 2 MG/ML
4 INJECTION, SOLUTION INTRAVENOUS ONCE
Status: COMPLETED | OUTPATIENT
Start: 2024-02-21 | End: 2024-02-21

## 2024-02-21 RX ORDER — MORPHINE SULFATE 4 MG/ML
4 INJECTION, SOLUTION INTRAMUSCULAR; INTRAVENOUS ONCE
Status: COMPLETED | OUTPATIENT
Start: 2024-02-21 | End: 2024-02-21

## 2024-02-21 RX ORDER — SODIUM CHLORIDE, SODIUM LACTATE, POTASSIUM CHLORIDE, CALCIUM CHLORIDE 600; 310; 30; 20 MG/100ML; MG/100ML; MG/100ML; MG/100ML
75 INJECTION, SOLUTION INTRAVENOUS CONTINUOUS
Status: DISCONTINUED | OUTPATIENT
Start: 2024-02-21 | End: 2024-02-25 | Stop reason: HOSPADM

## 2024-02-21 RX ORDER — DESVENLAFAXINE 50 MG/1
100 TABLET, EXTENDED RELEASE ORAL DAILY
Status: DISCONTINUED | OUTPATIENT
Start: 2024-02-21 | End: 2024-02-21 | Stop reason: ENTERED-IN-ERROR

## 2024-02-21 RX ORDER — DEXTROSE 50 % IN WATER (D50W) INTRAVENOUS SYRINGE
25
Status: DISCONTINUED | OUTPATIENT
Start: 2024-02-21 | End: 2024-02-25 | Stop reason: HOSPADM

## 2024-02-21 RX ORDER — LORAZEPAM 1 MG/1
1 TABLET ORAL 2 TIMES DAILY PRN
Status: DISCONTINUED | OUTPATIENT
Start: 2024-02-21 | End: 2024-02-25 | Stop reason: HOSPADM

## 2024-02-21 RX ORDER — ACETAMINOPHEN 325 MG/1
650 TABLET ORAL EVERY 4 HOURS PRN
Status: DISCONTINUED | OUTPATIENT
Start: 2024-02-21 | End: 2024-02-25 | Stop reason: HOSPADM

## 2024-02-21 RX ORDER — LOSARTAN POTASSIUM 50 MG/1
50 TABLET ORAL DAILY
Status: DISCONTINUED | OUTPATIENT
Start: 2024-02-21 | End: 2024-02-25 | Stop reason: HOSPADM

## 2024-02-21 RX ORDER — INSULIN LISPRO 100 [IU]/ML
0-10 INJECTION, SOLUTION INTRAVENOUS; SUBCUTANEOUS
Status: DISCONTINUED | OUTPATIENT
Start: 2024-02-21 | End: 2024-02-25 | Stop reason: HOSPADM

## 2024-02-21 RX ORDER — BUPROPION HYDROCHLORIDE 150 MG/1
450 TABLET ORAL
Status: DISCONTINUED | OUTPATIENT
Start: 2024-02-22 | End: 2024-02-25 | Stop reason: HOSPADM

## 2024-02-21 RX ORDER — HYDROXYZINE HYDROCHLORIDE 25 MG/1
25 TABLET, FILM COATED ORAL 3 TIMES DAILY PRN
Status: DISCONTINUED | OUTPATIENT
Start: 2024-02-21 | End: 2024-02-25 | Stop reason: HOSPADM

## 2024-02-21 RX ORDER — ACETAMINOPHEN 160 MG/5ML
650 SOLUTION ORAL EVERY 4 HOURS PRN
Status: DISCONTINUED | OUTPATIENT
Start: 2024-02-21 | End: 2024-02-25 | Stop reason: HOSPADM

## 2024-02-21 RX ORDER — PANTOPRAZOLE SODIUM 40 MG/1
40 TABLET, DELAYED RELEASE ORAL
Status: DISCONTINUED | OUTPATIENT
Start: 2024-02-21 | End: 2024-02-25 | Stop reason: HOSPADM

## 2024-02-21 RX ORDER — DEXTROSE MONOHYDRATE 100 MG/ML
0.3 INJECTION, SOLUTION INTRAVENOUS ONCE AS NEEDED
Status: DISCONTINUED | OUTPATIENT
Start: 2024-02-21 | End: 2024-02-25 | Stop reason: HOSPADM

## 2024-02-21 RX ORDER — PANTOPRAZOLE SODIUM 40 MG/10ML
40 INJECTION, POWDER, LYOPHILIZED, FOR SOLUTION INTRAVENOUS
Status: DISCONTINUED | OUTPATIENT
Start: 2024-02-21 | End: 2024-02-25 | Stop reason: HOSPADM

## 2024-02-21 RX ORDER — BUPROPION HYDROCHLORIDE 150 MG/1
150 TABLET ORAL DAILY
Status: DISCONTINUED | OUTPATIENT
Start: 2024-02-21 | End: 2024-02-21 | Stop reason: ENTERED-IN-ERROR

## 2024-02-21 RX ORDER — ACETAMINOPHEN 325 MG/1
650 TABLET ORAL EVERY 6 HOURS PRN
Status: DISCONTINUED | OUTPATIENT
Start: 2024-02-21 | End: 2024-02-25 | Stop reason: HOSPADM

## 2024-02-21 RX ORDER — MORPHINE SULFATE 2 MG/ML
2 INJECTION, SOLUTION INTRAMUSCULAR; INTRAVENOUS 4 TIMES DAILY PRN
Status: DISCONTINUED | OUTPATIENT
Start: 2024-02-21 | End: 2024-02-21

## 2024-02-21 RX ORDER — BUPROPION HYDROCHLORIDE 150 MG/1
300 TABLET ORAL
Status: DISCONTINUED | OUTPATIENT
Start: 2024-02-21 | End: 2024-02-21

## 2024-02-21 RX ORDER — ACETAMINOPHEN 650 MG/1
650 SUPPOSITORY RECTAL EVERY 4 HOURS PRN
Status: DISCONTINUED | OUTPATIENT
Start: 2024-02-21 | End: 2024-02-25 | Stop reason: HOSPADM

## 2024-02-21 RX ORDER — ONDANSETRON 4 MG/1
4 TABLET, FILM COATED ORAL EVERY 8 HOURS PRN
Status: DISCONTINUED | OUTPATIENT
Start: 2024-02-21 | End: 2024-02-25 | Stop reason: HOSPADM

## 2024-02-21 RX ORDER — HYDROMORPHONE HYDROCHLORIDE 1 MG/ML
0.6 INJECTION, SOLUTION INTRAMUSCULAR; INTRAVENOUS; SUBCUTANEOUS EVERY 4 HOURS PRN
Status: DISCONTINUED | OUTPATIENT
Start: 2024-02-21 | End: 2024-02-25

## 2024-02-21 RX ORDER — ARIPIPRAZOLE 2 MG/1
2 TABLET ORAL DAILY
Status: DISCONTINUED | OUTPATIENT
Start: 2024-02-21 | End: 2024-02-21 | Stop reason: ENTERED-IN-ERROR

## 2024-02-21 RX ADMIN — MORPHINE SULFATE 4 MG: 4 INJECTION, SOLUTION INTRAMUSCULAR; INTRAVENOUS at 11:50

## 2024-02-21 RX ADMIN — MORPHINE SULFATE 4 MG: 4 INJECTION, SOLUTION INTRAMUSCULAR; INTRAVENOUS at 06:19

## 2024-02-21 RX ADMIN — PIPERACILLIN SODIUM AND TAZOBACTAM SODIUM 3.38 G: 3; .375 INJECTION, SOLUTION INTRAVENOUS at 12:24

## 2024-02-21 RX ADMIN — SODIUM CHLORIDE, POTASSIUM CHLORIDE, SODIUM LACTATE AND CALCIUM CHLORIDE 150 ML/HR: 600; 310; 30; 20 INJECTION, SOLUTION INTRAVENOUS at 15:27

## 2024-02-21 RX ADMIN — HYDROMORPHONE HYDROCHLORIDE 0.2 MG: 0.2 INJECTION, SOLUTION INTRAMUSCULAR; INTRAVENOUS; SUBCUTANEOUS at 20:32

## 2024-02-21 RX ADMIN — ONDANSETRON 4 MG: 2 INJECTION INTRAMUSCULAR; INTRAVENOUS at 08:30

## 2024-02-21 RX ADMIN — INSULIN LISPRO 4 UNITS: 100 INJECTION, SOLUTION INTRAVENOUS; SUBCUTANEOUS at 14:49

## 2024-02-21 RX ADMIN — SODIUM CHLORIDE 1000 ML: 9 INJECTION, SOLUTION INTRAVENOUS at 04:10

## 2024-02-21 RX ADMIN — SODIUM CHLORIDE 1000 ML: 9 INJECTION, SOLUTION INTRAVENOUS at 02:41

## 2024-02-21 RX ADMIN — Medication 3 MG: at 20:28

## 2024-02-21 RX ADMIN — KETOROLAC TROMETHAMINE 15 MG: 30 INJECTION INTRAMUSCULAR; INTRAVENOUS at 04:10

## 2024-02-21 RX ADMIN — IOHEXOL 75 ML: 350 INJECTION, SOLUTION INTRAVENOUS at 03:21

## 2024-02-21 RX ADMIN — ONDANSETRON 4 MG: 2 INJECTION INTRAMUSCULAR; INTRAVENOUS at 02:42

## 2024-02-21 RX ADMIN — ENOXAPARIN SODIUM 40 MG: 40 INJECTION SUBCUTANEOUS at 20:27

## 2024-02-21 RX ADMIN — LOSARTAN POTASSIUM 50 MG: 50 TABLET, FILM COATED ORAL at 12:24

## 2024-02-21 RX ADMIN — MORPHINE SULFATE 4 MG: 4 INJECTION, SOLUTION INTRAMUSCULAR; INTRAVENOUS at 04:12

## 2024-02-21 RX ADMIN — DIPHENHYDRAMINE HYDROCHLORIDE AND LIDOCAINE HYDROCHLORIDE AND ALUMINUM HYDROXIDE AND MAGNESIUM HYDRO 10 ML: KIT at 05:47

## 2024-02-21 RX ADMIN — PIPERACILLIN SODIUM AND TAZOBACTAM SODIUM 3.38 G: 3; .375 INJECTION, SOLUTION INTRAVENOUS at 07:17

## 2024-02-21 RX ADMIN — PANTOPRAZOLE SODIUM 40 MG: 40 TABLET, DELAYED RELEASE ORAL at 08:30

## 2024-02-21 RX ADMIN — BUPROPION HYDROCHLORIDE 300 MG: 150 TABLET, EXTENDED RELEASE ORAL at 12:24

## 2024-02-21 RX ADMIN — INSULIN LISPRO 2 UNITS: 100 INJECTION, SOLUTION INTRAVENOUS; SUBCUTANEOUS at 18:35

## 2024-02-21 RX ADMIN — ACETAMINOPHEN 650 MG: 325 TABLET ORAL at 15:09

## 2024-02-21 RX ADMIN — SODIUM CHLORIDE, POTASSIUM CHLORIDE, SODIUM LACTATE AND CALCIUM CHLORIDE 150 ML/HR: 600; 310; 30; 20 INJECTION, SOLUTION INTRAVENOUS at 08:55

## 2024-02-21 RX ADMIN — PIPERACILLIN SODIUM AND TAZOBACTAM SODIUM 3.38 G: 3; .375 INJECTION, SOLUTION INTRAVENOUS at 18:35

## 2024-02-21 RX ADMIN — EMPAGLIFLOZIN 10 MG: 10 TABLET, FILM COATED ORAL at 12:24

## 2024-02-21 RX ADMIN — ACETAMINOPHEN 650 MG: 325 TABLET ORAL at 23:11

## 2024-02-21 RX ADMIN — ENOXAPARIN SODIUM 40 MG: 40 INJECTION SUBCUTANEOUS at 08:30

## 2024-02-21 RX ADMIN — MORPHINE SULFATE 4 MG: 4 INJECTION, SOLUTION INTRAMUSCULAR; INTRAVENOUS at 02:42

## 2024-02-21 SDOH — SOCIAL STABILITY: SOCIAL INSECURITY: ABUSE: ADULT

## 2024-02-21 SDOH — SOCIAL STABILITY: SOCIAL INSECURITY: ARE THERE ANY APPARENT SIGNS OF INJURIES/BEHAVIORS THAT COULD BE RELATED TO ABUSE/NEGLECT?: NO

## 2024-02-21 SDOH — SOCIAL STABILITY: SOCIAL INSECURITY: ARE YOU OR HAVE YOU BEEN THREATENED OR ABUSED PHYSICALLY, EMOTIONALLY, OR SEXUALLY BY ANYONE?: NO

## 2024-02-21 SDOH — SOCIAL STABILITY: SOCIAL INSECURITY: DO YOU FEEL UNSAFE GOING BACK TO THE PLACE WHERE YOU ARE LIVING?: NO

## 2024-02-21 SDOH — SOCIAL STABILITY: SOCIAL INSECURITY: HAS ANYONE EVER THREATENED TO HURT YOUR FAMILY OR YOUR PETS?: NO

## 2024-02-21 SDOH — SOCIAL STABILITY: SOCIAL INSECURITY: DO YOU FEEL ANYONE HAS EXPLOITED OR TAKEN ADVANTAGE OF YOU FINANCIALLY OR OF YOUR PERSONAL PROPERTY?: NO

## 2024-02-21 SDOH — SOCIAL STABILITY: SOCIAL INSECURITY: HAVE YOU HAD THOUGHTS OF HARMING ANYONE ELSE?: NO

## 2024-02-21 SDOH — SOCIAL STABILITY: SOCIAL INSECURITY: WERE YOU ABLE TO COMPLETE ALL THE BEHAVIORAL HEALTH SCREENINGS?: YES

## 2024-02-21 SDOH — SOCIAL STABILITY: SOCIAL INSECURITY: DOES ANYONE TRY TO KEEP YOU FROM HAVING/CONTACTING OTHER FRIENDS OR DOING THINGS OUTSIDE YOUR HOME?: NO

## 2024-02-21 ASSESSMENT — COGNITIVE AND FUNCTIONAL STATUS - GENERAL
MOBILITY SCORE: 24
DAILY ACTIVITIY SCORE: 24
PATIENT BASELINE BEDBOUND: NO

## 2024-02-21 ASSESSMENT — COLUMBIA-SUICIDE SEVERITY RATING SCALE - C-SSRS
BASED ON RESPONSES TO C-SSRS QS 1-6, WHAT IS THE PATIENT'S OVERALL RISK RATING FOR SUICIDE: NO RISK INDICATED
2. HAVE YOU ACTUALLY HAD ANY THOUGHTS OF KILLING YOURSELF?: NO
1. SINCE LAST CONTACT, HAVE YOU WISHED YOU WERE DEAD OR WISHED YOU COULD GO TO SLEEP AND NOT WAKE UP?: NO
2. HAVE YOU ACTUALLY HAD ANY THOUGHTS OF KILLING YOURSELF?: NO
1. IN THE PAST MONTH, HAVE YOU WISHED YOU WERE DEAD OR WISHED YOU COULD GO TO SLEEP AND NOT WAKE UP?: YES
6. HAVE YOU EVER DONE ANYTHING, STARTED TO DO ANYTHING, OR PREPARED TO DO ANYTHING TO END YOUR LIFE?: NO
6. HAVE YOU EVER DONE ANYTHING, STARTED TO DO ANYTHING, OR PREPARED TO DO ANYTHING TO END YOUR LIFE?: YES
6. HAVE YOU EVER DONE ANYTHING, STARTED TO DO ANYTHING, OR PREPARED TO DO ANYTHING TO END YOUR LIFE?: NO

## 2024-02-21 ASSESSMENT — ENCOUNTER SYMPTOMS
APPETITE CHANGE: 0
TROUBLE SWALLOWING: 0
CHILLS: 0
ABDOMINAL PAIN: 1
NAUSEA: 1
BLOOD IN STOOL: 0
DIARRHEA: 1
CONSTIPATION: 0
FEVER: 1
FATIGUE: 0
RECTAL PAIN: 0
VOMITING: 1
ANAL BLEEDING: 0
ABDOMINAL DISTENTION: 0
SHORTNESS OF BREATH: 0
COUGH: 0

## 2024-02-21 ASSESSMENT — LIFESTYLE VARIABLES
AUDIT TOTAL SCORE: 9
HOW MANY STANDARD DRINKS CONTAINING ALCOHOL DO YOU HAVE ON A TYPICAL DAY: 7 TO 9
SKIP TO QUESTIONS 9-10: 0
AUDIT TOTAL SCORE: 3
HOW OFTEN DO YOU HAVE 6 OR MORE DRINKS ON ONE OCCASION: LESS THAN MONTHLY
HOW OFTEN DURING THE LAST YEAR HAVE YOU HAD A FEELING OF GUILT OR REMORSE AFTER DRINKING: NEVER
PRESCIPTION_ABUSE_PAST_12_MONTHS: NO
HOW OFTEN DURING THE LAST YEAR HAVE YOU NEEDED AN ALCOHOLIC DRINK FIRST THING IN THE MORNING TO GET YOURSELF GOING AFTER A NIGHT OF HEAVY DRINKING: NEVER
SUBSTANCE_ABUSE_PAST_12_MONTHS: NO
HOW OFTEN DURING THE LAST YEAR HAVE YOU FAILED TO DO WHAT WAS NORMALLY EXPECTED FROM YOU BECAUSE OF DRINKING: NEVER
AUDIT-C TOTAL SCORE: 6
HAS A RELATIVE, FRIEND, DOCTOR, OR ANOTHER HEALTH PROFESSIONAL EXPRESSED CONCERN ABOUT YOUR DRINKING OR SUGGESTED YOU CUT DOWN: YES, BUT NOT IN THE LAST YEAR
HOW OFTEN DO YOU HAVE A DRINK CONTAINING ALCOHOL: 2-4 TIMES A MONTH
HOW OFTEN DURING THE LAST YEAR HAVE YOU BEEN UNABLE TO REMEMBER WHAT HAPPENED THE NIGHT BEFORE BECAUSE YOU HAD BEEN DRINKING: LESS THAN MONTHLY
HOW OFTEN DURING THE LAST YEAR HAVE YOU FOUND THAT YOU WERE NOT ABLE TO STOP DRINKING ONCE YOU HAD STARTED: NEVER
AUDIT-C TOTAL SCORE: 6
HAVE YOU OR SOMEONE ELSE BEEN INJURED AS A RESULT OF YOUR DRINKING: NO

## 2024-02-21 ASSESSMENT — ACTIVITIES OF DAILY LIVING (ADL)
JUDGMENT_ADEQUATE_SAFELY_COMPLETE_DAILY_ACTIVITIES: YES
ASSISTIVE_DEVICE: EYEGLASSES
WALKS IN HOME: INDEPENDENT
FEEDING YOURSELF: INDEPENDENT
HEARING - LEFT EAR: FUNCTIONAL
PATIENT'S MEMORY ADEQUATE TO SAFELY COMPLETE DAILY ACTIVITIES?: YES
DRESSING YOURSELF: INDEPENDENT
GROOMING: INDEPENDENT
BATHING: INDEPENDENT
HEARING - RIGHT EAR: FUNCTIONAL
ADEQUATE_TO_COMPLETE_ADL: YES
TOILETING: INDEPENDENT
LACK_OF_TRANSPORTATION: NO

## 2024-02-21 ASSESSMENT — PATIENT HEALTH QUESTIONNAIRE - PHQ9
2. FEELING DOWN, DEPRESSED OR HOPELESS: NEARLY EVERY DAY
1. LITTLE INTEREST OR PLEASURE IN DOING THINGS: NEARLY EVERY DAY
SUM OF ALL RESPONSES TO PHQ9 QUESTIONS 1 & 2: 6

## 2024-02-21 ASSESSMENT — PAIN SCALES - GENERAL
PAINLEVEL_OUTOF10: 3
PAINLEVEL_OUTOF10: 8
PAINLEVEL_OUTOF10: 6
PAINLEVEL_OUTOF10: 9
PAINLEVEL_OUTOF10: 0 - NO PAIN
PAINLEVEL_OUTOF10: 9

## 2024-02-21 ASSESSMENT — PAIN - FUNCTIONAL ASSESSMENT
PAIN_FUNCTIONAL_ASSESSMENT: 0-10
PAIN_FUNCTIONAL_ASSESSMENT: 0-10

## 2024-02-21 ASSESSMENT — PAIN DESCRIPTION - LOCATION
LOCATION: ABDOMEN
LOCATION: ABDOMEN

## 2024-02-21 ASSESSMENT — PAIN DESCRIPTION - PROGRESSION: CLINICAL_PROGRESSION: GRADUALLY IMPROVING

## 2024-02-21 ASSESSMENT — PAIN DESCRIPTION - ORIENTATION: ORIENTATION: RIGHT

## 2024-02-21 NOTE — ED PROVIDER NOTES
HPI   Chief Complaint   Patient presents with    Abdominal Pain     PER PATIENT HISTORY OF PANCREATITIS STATED HAVING ABD PAIN VOMITING AND NAUSEA        38-year-old male complains of generalized abdominal pain swelling more on the right side is noted to be tachycardic.  He states that pain is moderate to severe nothing makes better or worse.                        [place] Coma Scale Score: 15                     Patient History   Past Medical History:   Diagnosis Date    Other specified soft tissue disorders 11/16/2015    Leg swelling    Personal history of other diseases of the digestive system 08/11/2016    History of esophageal reflux     Past Surgical History:   Procedure Laterality Date    KNEE ARTHROSCOPY W/ DEBRIDEMENT  02/18/2016    Arthroscopy Knee Right    NECK SURGERY  02/18/2016    Neck Surgery    OTHER SURGICAL HISTORY  09/12/2016    Stab Phlebectomy Of Varicose Veins    OTHER SURGICAL HISTORY  06/07/2016    Venous Ligation With Stripping    OTHER SURGICAL HISTORY  06/07/2016    Endovenous Ablation Of Incompetent Vein    TONSILLECTOMY  11/16/2015    Tonsillectomy     Family History   Problem Relation Name Age of Onset    Hypertension Mother      Other (varicose veins) Mother       Social History     Tobacco Use    Smoking status: Every Day     Types: Cigarettes    Smokeless tobacco: Never   Substance Use Topics    Alcohol use: Yes    Drug use: Never       Physical Exam   ED Triage Vitals   Temperature Heart Rate Respirations BP   02/20/24 2258 02/20/24 2259 02/20/24 2259 02/20/24 2259   36.7 °C (98.1 °F) (!) 125 20 (!) 174/94      Pulse Ox Temp src Heart Rate Source Patient Position   02/20/24 2259 -- 02/20/24 2259 --   99 %  Monitor       BP Location FiO2 (%)     -- --             Physical Exam  Vitals reviewed.   Constitutional:       General: He is not in acute distress.     Appearance: Normal appearance. He is normal weight. He is not ill-appearing, toxic-appearing or diaphoretic.   HENT:       Head: Normocephalic and atraumatic.      Right Ear: External ear normal.      Left Ear: External ear normal.      Nose: Nose normal.   Eyes:      Extraocular Movements: Extraocular movements intact.      Conjunctiva/sclera: Conjunctivae normal.      Pupils: Pupils are equal, round, and reactive to light.   Cardiovascular:      Rate and Rhythm: Normal rate and regular rhythm.      Heart sounds: No murmur heard.  Pulmonary:      Effort: Pulmonary effort is normal. No respiratory distress.      Breath sounds: No stridor.   Abdominal:      General: Bowel sounds are normal. There is no distension.      Tenderness: There is generalized abdominal tenderness and tenderness in the right upper quadrant and epigastric area. There is guarding. There is no rebound. Negative signs include Hogan's sign and McBurney's sign.   Musculoskeletal:         General: No swelling or deformity.   Skin:     Capillary Refill: Capillary refill takes less than 2 seconds.      Findings: No rash.   Neurological:      General: No focal deficit present.      Mental Status: He is alert and oriented to person, place, and time. Mental status is at baseline.   Psychiatric:         Mood and Affect: Mood normal.         Behavior: Behavior normal.         Thought Content: Thought content normal.         Judgment: Judgment normal.       ED Course & MDM   Diagnoses as of 02/21/24 0549   Generalized abdominal pain   Leukocytosis, unspecified type   Epigastric abdominal pain       Medical Decision Making  Cholecystitis versus gastritis versus pancreatitis    Spoke with hospitalist for admit and ed attending.     Will admit for further and definitive treatment.           Procedure  Procedures     Emeterio Edwards PA-C  02/21/24 0602

## 2024-02-21 NOTE — PROGRESS NOTES
02/21/24 0812   Current Planned Discharge Disposition   Current Planned Discharge Disposition Home

## 2024-02-21 NOTE — ED TRIAGE NOTES
The patient was seen and examined in triage.    History of Present Illness: The patient is a 38-year-old male history of pancreatitis alcohol abuse presents emergency department for right upper quadrant abdominal pain and epigastric pain that started yesterday.  He states he has been drinking all week and subsequently has been having abdominal pain.  States he has had episodic nausea and vomiting.  He denies any hematemesis coffee-ground emesis or any other muscle aches and pains.    Brief Physical Exam:   Exam is limited by the patient sitting in a chair in triage.   Heart: Regular rate and rhythm.   Lungs: Clear to auscultation bilaterally.   Abdomen: Soft, nondistended, normoactive bowel sounds, right upper quadrant and epigastric abdominal pain with positive Hogan sign.    Plan: Appropriate labs and diagnostic imaging were ordered.      For the remainder of the patient's workup and ED course, please refer to the main ED provider note. We discussed need for diagnostic testing including laboratory studies and imaging.  We also discussed that patient may be asked to wait in the waiting room while these tests are pending.  They understand that if they choose to leave without having the testing completed or resulted that we cannot rule out acute life threatening illnesses and the risks involved could lead to worsening condition, permanent disability or even death.     Disclaimer: This note was dictated by speech recognition. Minor errors in transcription may be present. Please call if questions.

## 2024-02-21 NOTE — H&P
History Of Present Illness  Glenroy Parker is a 38 y.o. male presenting with abdominal pain, nausea, vomiting, diarrhea for 2 days notes pain is similar to his pancreatitis in the past..  Does note that he had a fever at home but was otherwise well.  Has no sick contacts.  Denies any shortness of breath, cough, or chest pain.  Does note that he went on a sanchez last week and drank quite a bit of alcohol but has not had anything since.  Continues to have some abdominal pain.     Past Medical History  Past Medical History:   Diagnosis Date    Other specified soft tissue disorders 11/16/2015    Leg swelling    Personal history of other diseases of the digestive system 08/11/2016    History of esophageal reflux       Surgical History  Past Surgical History:   Procedure Laterality Date    KNEE ARTHROSCOPY W/ DEBRIDEMENT  02/18/2016    Arthroscopy Knee Right    NECK SURGERY  02/18/2016    Neck Surgery    OTHER SURGICAL HISTORY  09/12/2016    Stab Phlebectomy Of Varicose Veins    OTHER SURGICAL HISTORY  06/07/2016    Venous Ligation With Stripping    OTHER SURGICAL HISTORY  06/07/2016    Endovenous Ablation Of Incompetent Vein    TONSILLECTOMY  11/16/2015    Tonsillectomy        Social History  Social History     Socioeconomic History    Marital status: Single     Spouse name: None    Number of children: None    Years of education: None    Highest education level: None   Occupational History    None   Tobacco Use    Smoking status: Every Day     Types: Cigarettes    Smokeless tobacco: Never   Substance and Sexual Activity    Alcohol use: Yes    Drug use: Never    Sexual activity: None   Other Topics Concern    None   Social History Narrative    None     Social Determinants of Health     Financial Resource Strain: Low Risk  (2/21/2024)    Overall Financial Resource Strain (CARDIA)     Difficulty of Paying Living Expenses: Not hard at all   Food Insecurity: Not on file   Transportation Needs: No Transportation Needs  (2/21/2024)    PRAPARE - Transportation     Lack of Transportation (Medical): No     Lack of Transportation (Non-Medical): No   Physical Activity: Not on file   Stress: Not on file   Social Connections: Not on file   Intimate Partner Violence: Not on file   Housing Stability: Low Risk  (2/21/2024)    Housing Stability Vital Sign     Unable to Pay for Housing in the Last Year: No     Number of Places Lived in the Last Year: 1     Unstable Housing in the Last Year: No       Family History  Family History   Problem Relation Name Age of Onset    Hypertension Mother      Other (varicose veins) Mother          Allergies  Patient has no known allergies.    Medications prior to admission  No current facility-administered medications on file prior to encounter.     Current Outpatient Medications on File Prior to Encounter   Medication Sig Dispense Refill    blood-glucose sensor (Dexcom G7 Sensor) device 3 each every 14 (fourteen) days. Change sensor (Patient not taking: Reported on 2/21/2024) 9 each 2    buPROPion XL (Wellbutrin XL) 150 mg 24 hr tablet Take 1 tablet (150 mg) by mouth once daily. Do not crush, chew, or split. (Patient taking differently: Take 1 tablet (150 mg) by mouth once daily. Takes with 300 mg for a total of 450 mg daily  Do not crush, chew, or split.) 30 tablet 2    buPROPion XL (Wellbutrin XL) 300 mg 24 hr tablet Take 1 tablet (300 mg) by mouth once daily in the morning. Take before meals. (Patient taking differently: Take 1 tablet (300 mg) by mouth once daily in the morning. Take before meals. Takes with 150 mg for a total of 450 mg daily) 90 tablet 3    hydrOXYzine HCL (Atarax) 25 mg tablet Take 1 tablet (25 mg) by mouth 3 times a day as needed for anxiety.      Jardiance 10 mg Take 1 tablet (10 mg) by mouth once daily. 90 tablet 2    losartan (Cozaar) 50 mg tablet Take 1 tablet (50 mg) by mouth once daily. 90 tablet 0    metFORMIN  mg 24 hr tablet Take 2 tablets (1,000 mg) by mouth once daily.  "180 tablet 3    nirmatrelvir-ritonavir (Paxlovid) 300 mg (150 mg x 2)-100 mg tablet therapy pack Take 2 tablets of 150 mg with 1 tablet of 100 mg twice daily until finished (Patient not taking: Reported on 2/21/2024) 30 tablet 0    propranolol (Inderal) 80 mg tablet TAKE 1-2 TABLETS 3 TIMES DAILY AS NEEDED FOR PANIC AND HEART RACING      semaglutide (Ozempic) 1 mg/dose (2 mg/1.5 mL) pen injector Inject 1 mg under the skin 1 (one) time per week. (Patient taking differently: Inject 1 mg under the skin 1 (one) time per week. monday) 1 each 2    [DISCONTINUED] ARIPiprazole (Abilify) 2 mg tablet Take 1 tablet (2 mg) by mouth once daily.      [DISCONTINUED] BD Insulin Syringe Ultra-Fine 0.5 mL 30 gauge x 1/2\" syringe TWICE DAILY      [DISCONTINUED] blood sugar diagnostic (OneTouch Verio test strips) strip TEST 3 TIMES DAILY.      [DISCONTINUED] desvenlafaxine 100 mg 24 hr tablet Take 1 tablet (100 mg) by mouth once daily.      [DISCONTINUED] lancets 33 gauge misc 3 times a day. For glucose testing      [DISCONTINUED] LORazepam (Ativan) 1 mg tablet Take 1 tablet (1 mg) by mouth 2 times a day as needed for anxiety.      [DISCONTINUED] NovoLOG U-100 Insulin aspart 100 unit/mL injection       [DISCONTINUED] ondansetron HCl (ZOFRAN ORAL) Zofran      [DISCONTINUED] pantoprazole (ProtoNix) 40 mg EC tablet Take 1 tablet (40 mg) by mouth once daily. for 90 day(s)         Review of Systems  10 point review systems otherwise negative  Vital Signs  /74   Pulse (!) 117   Temp (!) 38.2 °C (100.8 °F) (Oral)   Resp 19   Wt 150 kg (330 lb)   SpO2 95%     Physical Exam  Vitals and nursing note reviewed.   Constitutional:       General: He is not in acute distress.     Appearance: Normal appearance. He is not ill-appearing.   HENT:      Head: Normocephalic and atraumatic.      Mouth/Throat:      Mouth: Mucous membranes are moist.      Pharynx: Oropharynx is clear.   Eyes:      Extraocular Movements: Extraocular movements intact. "      Conjunctiva/sclera: Conjunctivae normal.   Cardiovascular:      Rate and Rhythm: Normal rate and regular rhythm.      Heart sounds: Normal heart sounds. No murmur heard.     No friction rub. No gallop.   Pulmonary:      Effort: Pulmonary effort is normal.      Breath sounds: Normal breath sounds. No wheezing or rales.   Abdominal:      General: Bowel sounds are normal. There is no distension.      Palpations: Abdomen is soft.      Tenderness: There is abdominal tenderness. There is no guarding.   Musculoskeletal:         General: No swelling or tenderness.      Right lower leg: No edema.      Left lower leg: No edema.   Skin:     General: Skin is warm and dry.      Capillary Refill: Capillary refill takes less than 2 seconds.   Neurological:      General: No focal deficit present.      Mental Status: He is alert and oriented to person, place, and time.   Psychiatric:         Mood and Affect: Mood normal.                Relevant Results  Results for orders placed or performed during the hospital encounter of 02/21/24 (from the past 24 hour(s))   Gray Top   Result Value Ref Range    Extra Tube Hold for add-ons.    CBC and Auto Differential   Result Value Ref Range    WBC 19.6 (H) 4.4 - 11.3 x10*3/uL    nRBC 0.0 0.0 - 0.0 /100 WBCs    RBC 5.62 4.50 - 5.90 x10*6/uL    Hemoglobin 14.8 13.5 - 17.5 g/dL    Hematocrit 46.3 41.0 - 52.0 %    MCV 82 80 - 100 fL    MCH 26.3 26.0 - 34.0 pg    MCHC 32.0 32.0 - 36.0 g/dL    RDW 14.2 11.5 - 14.5 %    Platelets 295 150 - 450 x10*3/uL    Neutrophils % 84.4 40.0 - 80.0 %    Immature Granulocytes %, Automated 0.6 0.0 - 0.9 %    Lymphocytes % 7.0 13.0 - 44.0 %    Monocytes % 6.9 2.0 - 10.0 %    Eosinophils % 0.5 0.0 - 6.0 %    Basophils % 0.6 0.0 - 2.0 %    Neutrophils Absolute 16.55 (H) 1.20 - 7.70 x10*3/uL    Immature Granulocytes Absolute, Automated 0.12 0.00 - 0.70 x10*3/uL    Lymphocytes Absolute 1.38 1.20 - 4.80 x10*3/uL    Monocytes Absolute 1.36 (H) 0.10 - 1.00 x10*3/uL     Eosinophils Absolute 0.10 0.00 - 0.70 x10*3/uL    Basophils Absolute 0.12 (H) 0.00 - 0.10 x10*3/uL   Comprehensive metabolic panel   Result Value Ref Range    Glucose 289 (H) 74 - 99 mg/dL    Sodium 129 (L) 136 - 145 mmol/L    Potassium 4.3 3.5 - 5.3 mmol/L    Chloride 94 (L) 98 - 107 mmol/L    Bicarbonate 24 21 - 32 mmol/L    Anion Gap 15 10 - 20 mmol/L    Urea Nitrogen 23 6 - 23 mg/dL    Creatinine 0.84 0.50 - 1.30 mg/dL    eGFR >90 >60 mL/min/1.73m*2    Calcium 9.1 8.6 - 10.3 mg/dL    Albumin 4.3 3.4 - 5.0 g/dL    Alkaline Phosphatase 73 33 - 120 U/L    Total Protein 7.9 6.4 - 8.2 g/dL    AST 16 9 - 39 U/L    Bilirubin, Total 0.4 0.0 - 1.2 mg/dL    ALT 31 10 - 52 U/L   Lipase   Result Value Ref Range    Lipase 10 9 - 82 U/L   ECG 12 lead   Result Value Ref Range    Ventricular Rate 126 BPM    Atrial Rate 126 BPM    HI Interval 150 ms    QRS Duration 72 ms    QT Interval 304 ms    QTC Calculation(Bazett) 440 ms    P Axis 53 degrees    R Axis 70 degrees    T Axis 59 degrees    QRS Count 21 beats    Q Onset 224 ms    P Onset 149 ms    P Offset 208 ms    T Offset 376 ms    QTC Fredericia 389 ms   BLOOD GAS VENOUS FULL PANEL   Result Value Ref Range    POCT pH, Venous 7.41 7.33 - 7.43 pH    POCT pCO2, Venous 34 (L) 41 - 51 mm Hg    POCT pO2, Venous 149 (H) 35 - 45 mm Hg    POCT SO2, Venous 99 (H) 45 - 75 %    POCT Oxy Hemoglobin, Venous 94.5 (H) 45.0 - 75.0 %    POCT Hematocrit Calculated, Venous 40.0 (L) 41.0 - 52.0 %    POCT Sodium, Venous 130 (L) 136 - 145 mmol/L    POCT Potassium, Venous 3.9 3.5 - 5.3 mmol/L    POCT Chloride, Venous 102 98 - 107 mmol/L    POCT Ionized Calicum, Venous 1.09 (L) 1.10 - 1.33 mmol/L    POCT Glucose, Venous 244 (H) 74 - 99 mg/dL    POCT Lactate, Venous 1.6 0.4 - 2.0 mmol/L    POCT Base Excess, Venous -2.4 (L) -2.0 - 3.0 mmol/L    POCT HCO3 Calculated, Venous 21.6 (L) 22.0 - 26.0 mmol/L    POCT Hemoglobin, Venous 13.2 (L) 13.5 - 17.5 g/dL    POCT Anion Gap, Venous 10.0 10.0 - 25.0 mmol/L     Patient Temperature 37.0 degrees Celsius    FiO2 21 %   Drug Screen, Urine   Result Value Ref Range    Amphetamine Screen, Urine Presumptive Negative Presumptive Negative    Barbiturate Screen, Urine Presumptive Negative Presumptive Negative    Benzodiazepines Screen, Urine Presumptive Negative Presumptive Negative    Cannabinoid Screen, Urine Presumptive Negative Presumptive Negative    Cocaine Metabolite Screen, Urine Presumptive Negative Presumptive Negative    Fentanyl Screen, Urine Presumptive Negative Presumptive Negative    Opiate Screen, Urine Presumptive Positive (A) Presumptive Negative    Oxycodone Screen, Urine Presumptive Negative Presumptive Negative    PCP Screen, Urine Presumptive Negative Presumptive Negative   CBC   Result Value Ref Range    WBC 16.5 (H) 4.4 - 11.3 x10*3/uL    nRBC 0.0 0.0 - 0.0 /100 WBCs    RBC 5.55 4.50 - 5.90 x10*6/uL    Hemoglobin 14.9 13.5 - 17.5 g/dL    Hematocrit 46.7 41.0 - 52.0 %    MCV 84 80 - 100 fL    MCH 26.8 26.0 - 34.0 pg    MCHC 31.9 (L) 32.0 - 36.0 g/dL    RDW 14.3 11.5 - 14.5 %    Platelets 247 150 - 450 x10*3/uL   Basic metabolic panel   Result Value Ref Range    Glucose 202 (H) 74 - 99 mg/dL    Sodium 133 (L) 136 - 145 mmol/L    Potassium 3.9 3.5 - 5.3 mmol/L    Chloride 100 98 - 107 mmol/L    Bicarbonate 23 21 - 32 mmol/L    Anion Gap 14 10 - 20 mmol/L    Urea Nitrogen 20 6 - 23 mg/dL    Creatinine 0.69 0.50 - 1.30 mg/dL    eGFR >90 >60 mL/min/1.73m*2    Calcium 8.3 (L) 8.6 - 10.3 mg/dL   POCT GLUCOSE   Result Value Ref Range    POCT Glucose 234 (H) 74 - 99 mg/dL       CT abdomen pelvis w IV contrast   Final Result   No definite acute intra-abdominal pathology identified.   A 3 mm right lower lobe pulmonary nodule, follow-up per Fleischner   Society Guidelines.   Mild hepatic steatosis.   Colonic diverticulosis.   Fleischner Society 2017 Guidelines for Management of Incidentally   Detected Pulmonary Nodules in Adults:   A.  SOLID NODULES*   Nodule Type and  Size:   Single:   *Low Risk**    < 6 mm - No routine follow-up   6-8 mm - CT at 6-12 months, then consider CT at 18-24 months   > 8 mm - Consider CT at 3 months, PET/CT, or tissue sampling   *High Risk**   < 6 mm - Optional CT at 12 months   6-8 mm - CT at 6-12 months, then CT at 18-24 months   > 8 mm - Consider CT at 3 months, PET/CT, or tissue sampling   Multiple:   *Low Risk**    < 6 mm - No routine follow-up   6-8 mm - CT at 3-6 months, then consider CT at 18-24 months   > 8 mm - CT at 3-6 months, then consider CT at 18-24 months   *High Risk**   < 6 mm - Optional CT at 12 months   6-8 mm - CT at 3-6 months, then at 18-24 months   > 8 mm - CT at 3-6 months, then at 18-24 months   B. SUBSOLID NODULES*   Nodule Type and Size:   Single:     *Ground glass   < 6 mm - No routine follow-up   > 6 mm - CT at 6-12 months to confirm persistence, then CT every 2   years until 5 years   *Part Solid   < 6 mm - No routine follow-up   > 6 mm - CT at 3-6 months to confirm persistence.  If unchanged and   solid component remains < 6 mm, annual CT should be performed for 5   years.   Multiple:   < 6 mm - CT at 3-6 months.  If stable, consider CT at 2 and 4 years.   > 6 mm - CT at 3-6 months.  Subsequent management based on the most   suspicious nodule(s).   Note - These recommendations do not apply to lung cancer screening,   patients with immunosuppression, or patients with known primary   cancer.   * Dimensions are average of long and short axes, rounded to the   nearest millimeter.   ** Consider all relevant risk factors.   Signed by Fercho Foster      XR chest 1 view   Final Result   No acute cardiopulmonary disease.   Signed by Fercho Foster      US gallbladder   Final Result   Hepatomegaly with increased hepatic parenchymal echotexture, most   commonly indicating hepatic steatosis.   No cholelithiasis, gallbladder wall thickening, or biliary dilatation   is appreciated.   Signed by Fercho Foster          Scheduled  medications  [START ON 2/22/2024] buPROPion XL, 450 mg, oral, Daily before breakfast  empagliflozin, 10 mg, oral, Daily  enoxaparin, 40 mg, subcutaneous, q12h ROBERTO CARLOS  insulin lispro, 0-10 Units, subcutaneous, TID with meals  losartan, 50 mg, oral, Daily  melatonin, 3 mg, oral, Daily  pantoprazole, 40 mg, oral, Daily before breakfast   Or  pantoprazole, 40 mg, intravenous, Daily before breakfast  piperacillin-tazobactam, 3.375 g, intravenous, q6h      Continuous medications  lactated Ringer's, 150 mL/hr, Last Rate: 150 mL/hr (02/21/24 1527)      PRN medications  PRN medications: acetaminophen **OR** acetaminophen **OR** acetaminophen, acetaminophen, dextrose 10 % in water (D10W), dextrose, glucagon, hydrOXYzine HCL, LORazepam, morphine, morphine, ondansetron **OR** ondansetron      Assessment/Plan   Principal Problem:    Generalized abdominal pain     Abdominal pain with leukocytosis  -Suspect gastroenteritis   -Does have hx of pancreatitis but lipase not elevated and CT without evidence of inflammation  -Continue IVF  -Zofran  -Pain meds  -Appreciate GI recs     DM type II  -Corrective insulin   -Blood sugar checks with meals and at bedtime   -Hypoglycemic protocol     HTN  -Hold home BP meds    Tobacco abuse  -[place] cessation       DVT ppx   -Lovenox                Mac Patton, DO [facility]

## 2024-02-21 NOTE — PROGRESS NOTES
Transitional Care Coordination Progress Note:  Plan per Medical/Surgical team: treatment of abd pain, N/V with toradol, morphine, zofran, protonix, IV ATB, IV fluids, full liquid diet, GI consult  Status: inpatient   Payor source: [facility]  Discharge disposition: home  Potential Barriers: ETOH abuse & pancreatits HX  , BP improved @ 140/95, glucose 289,   ADOD: 2/23/2024  SUMIT Shah RN, BSN Transitional Care Coordinator ED# [phone]      02/21/24 0812   Discharge Planning   Living Arrangements Friends   Support Systems Friends/neighbors   Assistance Needed GI work up, ETOH abuse recovery resources   Type of Residence Private residence   Number of Stairs to Enter Residence 2   Number of Stairs Within Residence 14   Home or Post Acute Services None   Patient expects to be discharged to: home   Does the patient need discharge transport arranged? Yes   RoundTrip coordination needed? Yes   Has discharge transport been arranged? No   Financial Resource Strain   How hard is it for you to pay for the very basics like food, housing, medical care, and heating? Not hard   Housing Stability   In the last 12 months, was there a time when you were not able to pay the mortgage or rent on time? N   In the last 12 months, how many places have you lived? 1   In the last 12 months, was there a time when you did not have a steady place to sleep or slept in a shelter (including now)? N   Transportation Needs   In the past 12 months, has lack of transportation kept you from medical appointments or from getting medications? no   In the past 12 months, has lack of transportation kept you from meetings, work, or from getting things needed for daily living? No

## 2024-02-21 NOTE — DISCHARGE INSTR - OTHER ORDERS
Your outpatient resources for alcohol dependence are:   Alcoholics Anonymous [phone],    [facility] [phone],   [facility] [phone],   [facility] [phone],   [facility] [phone]  [facility] [phone] in [facility] peer recovery services [phone]    [address] [address]  [phone]  Walk-ins/phone calls accepted 24/7/365.    [facility] outpatient programs for mental health and chemical dependency services  [address] [place] [address]   [phone],   or  [address] [place], [place] [address]   [phone]     [address] for [facility]'s First Call for help, 24/7/365

## 2024-02-21 NOTE — CONSULTS
Inpatient consult to Gastroenterology  Consult performed by: SHAYY Sahni-CNP  Consult ordered by: Manav Hernandez DO  Reason for consult: Abdominal pain      Gastroenterology Consultation Note    ASSESSMENT and PLAN:     Glenroy Parker is a 38 y.o. male with a past medical history of DM2 on Ozempic, nicotine dependence, HTN, KELLY, vascular insufficiency, GERD, abdominal abscess, panic attacks, pancreatitis and EtOH abuse who presented with complaints of abdominal pain, nausea and vomiting.  GI consulted for abdominal pain    Pain most suggestive of self-limited viral gastroenteritis.  No pancreatitis.  Consider overlapping side effects from heavy alcohol use    -Supportive care per primary team  -Recommendation to avoid alcohol use  -Recommend patient discuss risk of pancreatitis with Ozempic with his prescribing physician, consider other alternative treatment if possible    GI signing off     Tri Quiles CNP    HISTORY OF PRESENT ILLNESS:     History Of Present Illness:    Glenroy Parker is a 38 y.o. male who came to the ED for right upper abdominal pain radiating to his back  that started around 10 PM last night.  He has had liquid brown diarrhea, a fever of 102, nausea and vomited 2 nonbloody emesis.  States he had a rough week so since last weekend he has been drinking 6-7 drinks per night, when he normally tries to avoid alcohol.  He was concerned this was a another episode of pancreatitis.  CT upon admission was unremarkable besides hepatic steatosis and diverticulosis, showed no pancreatitis.  Lipase was normal, but does have elevated WBC of 19.6.  Had positive opiate tox screen after receiving morphine.  4/2023 EUS Dr. Guzman for acute pancreatitis and epigastric pain: Z-line irregular, mild chronic pancreatitis.  Biopsies showed metaplasia but were negative for dysplasia and H. pylori.     Relevant endoscopic evaluation results were personally reviewed.    Review of systems:   Review of Systems    Constitutional:  Positive for fever. Negative for appetite change, chills and fatigue.   HENT:  Negative for trouble swallowing.    Respiratory:  Negative for cough and shortness of breath.    Gastrointestinal:  Positive for abdominal pain, diarrhea, nausea and vomiting. Negative for abdominal distention, anal bleeding, blood in stool, constipation and rectal pain.      A 10+ point ROS was completed and otherwise negative except as noted and per HPI.    PAST HISTORIES:     Past Medical History:  Past Medical History:   Diagnosis Date    Other specified soft tissue disorders 11/16/2015    Leg swelling    Personal history of other diseases of the digestive system 08/11/2016    History of esophageal reflux       Past Surgical History:  Past Surgical History:   Procedure Laterality Date    KNEE ARTHROSCOPY W/ DEBRIDEMENT  02/18/2016    Arthroscopy Knee Right    NECK SURGERY  02/18/2016    Neck Surgery    OTHER SURGICAL HISTORY  09/12/2016    Stab Phlebectomy Of Varicose Veins    OTHER SURGICAL HISTORY  06/07/2016    Venous Ligation With Stripping    OTHER SURGICAL HISTORY  06/07/2016    Endovenous Ablation Of Incompetent Vein    TONSILLECTOMY  11/16/2015    Tonsillectomy        Family History:  Family History   Problem Relation Name Age of Onset    Hypertension Mother      Other (varicose veins) Mother          Social History:   reports that he has been smoking cigarettes. He has never used smokeless tobacco. He reports current alcohol use. He reports that he does not use drugs.    OBJECTIVE:     Last Recorded Vitals:  Vitals:    02/21/24 0431 02/21/24 0600 02/21/24 0824 02/21/24 1146   BP: 123/79 (!) 140/95 (!) 134/91 (!) 138/96   Pulse: (!) 107 (!) 102 (!) 103 (!) 110   Resp: 16 18 16 16   Temp:    (!) 38.2 °C (100.8 °F)   TempSrc:    Oral   SpO2: 97% 98% 97% 97%   Weight:       Height:           Physical Exam:  Physical Exam  Constitutional:       Appearance: Normal appearance. He is obese.   HENT:       Mouth/Throat:      Mouth: Mucous membranes are dry.      Pharynx: Oropharynx is clear.   Cardiovascular:      Rate and Rhythm: Normal rate and regular rhythm.   Pulmonary:      Effort: Pulmonary effort is normal.      Breath sounds: Normal breath sounds. No wheezing or rhonchi.   Abdominal:      General: Abdomen is flat. Bowel sounds are normal. There is no distension.      Palpations: Abdomen is soft. There is no hepatomegaly.      Tenderness: There is abdominal tenderness (RUQ radiating to back and LLQ). There is no guarding or rebound. Negative signs include Hogan's sign.      Hernia: No hernia is present.   Musculoskeletal:         General: Normal range of motion.   Skin:     General: Skin is warm and dry.   Neurological:      General: No focal deficit present.      Mental Status: He is alert and oriented to person, place, and time.   Psychiatric:         Mood and Affect: Mood normal.         Behavior: Behavior normal.           Allergies:  No Known Allergies    Inpatient Medications:  ARIPiprazole, 2 mg, oral, Daily  buPROPion XL, 300 mg, oral, Daily before breakfast  desvenlafaxine, 100 mg, oral, Daily  empagliflozin, 10 mg, oral, Daily  enoxaparin, 40 mg, subcutaneous, q12h ROBERTO CARLOS  insulin lispro, 0-10 Units, subcutaneous, TID with meals  losartan, 50 mg, oral, Daily  melatonin, 3 mg, oral, Daily  pantoprazole, 40 mg, oral, Daily before breakfast   Or  pantoprazole, 40 mg, intravenous, Daily before breakfast  piperacillin-tazobactam, 3.375 g, intravenous, q6h      lactated Ringer's, 150 mL/hr, Last Rate: 150 mL/hr (02/21/24 0855)      PRN medications: acetaminophen **OR** acetaminophen **OR** acetaminophen, dextrose 10 % in water (D10W), dextrose, glucagon, hydrOXYzine HCL, LORazepam, morphine, morphine, ondansetron **OR** ondansetron    Outpatient Medications:  Prior to Admission medications    Medication Sig Start Date End Date Taking? Authorizing Provider   ARIPiprazole (Abilify) 2 mg tablet Take 1 tablet (2  "mg) by mouth once daily.    Historical Provider, MD   BD Insulin Syringe Ultra-Fine 0.5 mL 30 gauge x 1/2\" syringe TWICE DAILY 11/30/22   Historical Provider, MD   blood sugar diagnostic (OneTouch Verio test strips) strip TEST 3 TIMES DAILY. 8/11/21   Elly Provider, MD   blood-glucose sensor (Dexcom G7 Sensor) device 3 each every 14 (fourteen) days. Change sensor 10/10/23   Funmilayo Hines MD   buPROPion XL (Wellbutrin XL) 150 mg 24 hr tablet Take 1 tablet (150 mg) by mouth once daily. Do not crush, chew, or split. 2/12/24 2/11/25  Young Mayo DO   buPROPion XL (Wellbutrin XL) 300 mg 24 hr tablet Take 1 tablet (300 mg) by mouth once daily in the morning. Take before meals. 6/13/23 6/12/24  Young Mayo DO   desvenlafaxine 100 mg 24 hr tablet Take 1 tablet (100 mg) by mouth once daily.    Historical Provider, MD   hydrOXYzine HCL (Atarax) 25 mg tablet Take 1 tablet (25 mg) by mouth 3 times a day as needed for anxiety.    Historical Provider, MD   Jardiance 10 mg Take 1 tablet (10 mg) by mouth once daily. 10/10/23   Funmilayo Hines MD   lancets 33 gauge misc 3 times a day. For glucose testing    Historical Provider, MD   LORazepam (Ativan) 1 mg tablet Take 1 tablet (1 mg) by mouth 2 times a day as needed for anxiety. 7/1/22   Historical Provider, MD   losartan (Cozaar) 50 mg tablet Take 1 tablet (50 mg) by mouth once daily. 2/5/24   Young Mayo DO   metFORMIN  mg 24 hr tablet Take 2 tablets (1,000 mg) by mouth once daily. 10/10/23 10/9/24  Funmilayo Hines MD   nirmatrelvir-ritonavir (Paxlovid) 300 mg (150 mg x 2)-100 mg tablet therapy pack Take 2 tablets of 150 mg with 1 tablet of 100 mg twice daily until finished 9/19/23   Javier Buchanan, DO   NovoLOG U-100 Insulin aspart 100 unit/mL injection  4/17/23   Historical Provider, MD   ondansetron HCl (ZOFRAN ORAL) Zofran    Historical Provider, MD   pantoprazole (ProtoNix) 40 mg EC tablet Take 1 tablet (40 mg) by " mouth once daily. for 90 day(s) 6/14/22   Historical Provider, MD   propranolol (Inderal) 80 mg tablet TAKE 1-2 TABLETS 3 TIMES DAILY AS NEEDED FOR PANIC AND HEART RACING 2/26/23   Historical Provider, MD   semaglutide (Ozempic) 1 mg/dose (2 mg/1.5 mL) pen injector Inject 1 mg under the skin 1 (one) time per week. 10/10/23   Funmilayo Hines MD       LABS AND IMAGING:     Labs:  Results for orders placed or performed during the hospital encounter of 02/21/24 (from the past 24 hour(s))   Gray Top   Result Value Ref Range    Extra Tube Hold for add-ons.    CBC and Auto Differential   Result Value Ref Range    WBC 19.6 (H) 4.4 - 11.3 x10*3/uL    nRBC 0.0 0.0 - 0.0 /100 WBCs    RBC 5.62 4.50 - 5.90 x10*6/uL    Hemoglobin 14.8 13.5 - 17.5 g/dL    Hematocrit 46.3 41.0 - 52.0 %    MCV 82 80 - 100 fL    MCH 26.3 26.0 - 34.0 pg    MCHC 32.0 32.0 - 36.0 g/dL    RDW 14.2 11.5 - 14.5 %    Platelets 295 150 - 450 x10*3/uL    Neutrophils % 84.4 40.0 - 80.0 %    Immature Granulocytes %, Automated 0.6 0.0 - 0.9 %    Lymphocytes % 7.0 13.0 - 44.0 %    Monocytes % 6.9 2.0 - 10.0 %    Eosinophils % 0.5 0.0 - 6.0 %    Basophils % 0.6 0.0 - 2.0 %    Neutrophils Absolute 16.55 (H) 1.20 - 7.70 x10*3/uL    Immature Granulocytes Absolute, Automated 0.12 0.00 - 0.70 x10*3/uL    Lymphocytes Absolute 1.38 1.20 - 4.80 x10*3/uL    Monocytes Absolute 1.36 (H) 0.10 - 1.00 x10*3/uL    Eosinophils Absolute 0.10 0.00 - 0.70 x10*3/uL    Basophils Absolute 0.12 (H) 0.00 - 0.10 x10*3/uL   Comprehensive metabolic panel   Result Value Ref Range    Glucose 289 (H) 74 - 99 mg/dL    Sodium 129 (L) 136 - 145 mmol/L    Potassium 4.3 3.5 - 5.3 mmol/L    Chloride 94 (L) 98 - 107 mmol/L    Bicarbonate 24 21 - 32 mmol/L    Anion Gap 15 10 - 20 mmol/L    Urea Nitrogen 23 6 - 23 mg/dL    Creatinine 0.84 0.50 - 1.30 mg/dL    eGFR >90 >60 mL/min/1.73m*2    Calcium 9.1 8.6 - 10.3 mg/dL    Albumin 4.3 3.4 - 5.0 g/dL    Alkaline Phosphatase 73 33 - 120 U/L    Total  Protein 7.9 6.4 - 8.2 g/dL    AST 16 9 - 39 U/L    Bilirubin, Total 0.4 0.0 - 1.2 mg/dL    ALT 31 10 - 52 U/L   Lipase   Result Value Ref Range    Lipase 10 9 - 82 U/L   ECG 12 lead   Result Value Ref Range    Ventricular Rate 126 BPM    Atrial Rate 126 BPM    NV Interval 150 ms    QRS Duration 72 ms    QT Interval 304 ms    QTC Calculation(Bazett) 440 ms    P Axis 53 degrees    R Axis 70 degrees    T Axis 59 degrees    QRS Count 21 beats    Q Onset 224 ms    P Onset 149 ms    P Offset 208 ms    T Offset 376 ms    QTC Fredericia 389 ms   BLOOD GAS VENOUS FULL PANEL   Result Value Ref Range    POCT pH, Venous 7.41 7.33 - 7.43 pH    POCT pCO2, Venous 34 (L) 41 - 51 mm Hg    POCT pO2, Venous 149 (H) 35 - 45 mm Hg    POCT SO2, Venous 99 (H) 45 - 75 %    POCT Oxy Hemoglobin, Venous 94.5 (H) 45.0 - 75.0 %    POCT Hematocrit Calculated, Venous 40.0 (L) 41.0 - 52.0 %    POCT Sodium, Venous 130 (L) 136 - 145 mmol/L    POCT Potassium, Venous 3.9 3.5 - 5.3 mmol/L    POCT Chloride, Venous 102 98 - 107 mmol/L    POCT Ionized Calicum, Venous 1.09 (L) 1.10 - 1.33 mmol/L    POCT Glucose, Venous 244 (H) 74 - 99 mg/dL    POCT Lactate, Venous 1.6 0.4 - 2.0 mmol/L    POCT Base Excess, Venous -2.4 (L) -2.0 - 3.0 mmol/L    POCT HCO3 Calculated, Venous 21.6 (L) 22.0 - 26.0 mmol/L    POCT Hemoglobin, Venous 13.2 (L) 13.5 - 17.5 g/dL    POCT Anion Gap, Venous 10.0 10.0 - 25.0 mmol/L    Patient Temperature 37.0 degrees Celsius    FiO2 21 %   Drug Screen, Urine   Result Value Ref Range    Amphetamine Screen, Urine Presumptive Negative Presumptive Negative    Barbiturate Screen, Urine Presumptive Negative Presumptive Negative    Benzodiazepines Screen, Urine Presumptive Negative Presumptive Negative    Cannabinoid Screen, Urine Presumptive Negative Presumptive Negative    Cocaine Metabolite Screen, Urine Presumptive Negative Presumptive Negative    Fentanyl Screen, Urine Presumptive Negative Presumptive Negative    Opiate Screen, Urine  Presumptive Positive (A) Presumptive Negative    Oxycodone Screen, Urine Presumptive Negative Presumptive Negative    PCP Screen, Urine Presumptive Negative Presumptive Negative   CBC   Result Value Ref Range    WBC 16.5 (H) 4.4 - 11.3 x10*3/uL    nRBC 0.0 0.0 - 0.0 /100 WBCs    RBC 5.55 4.50 - 5.90 x10*6/uL    Hemoglobin 14.9 13.5 - 17.5 g/dL    Hematocrit 46.7 41.0 - 52.0 %    MCV 84 80 - 100 fL    MCH 26.8 26.0 - 34.0 pg    MCHC 31.9 (L) 32.0 - 36.0 g/dL    RDW 14.3 11.5 - 14.5 %    Platelets 247 150 - 450 x10*3/uL   Basic metabolic panel   Result Value Ref Range    Glucose 202 (H) 74 - 99 mg/dL    Sodium 133 (L) 136 - 145 mmol/L    Potassium 3.9 3.5 - 5.3 mmol/L    Chloride 100 98 - 107 mmol/L    Bicarbonate 23 21 - 32 mmol/L    Anion Gap 14 10 - 20 mmol/L    Urea Nitrogen 20 6 - 23 mg/dL    Creatinine 0.69 0.50 - 1.30 mg/dL    eGFR >90 >60 mL/min/1.73m*2    Calcium 8.3 (L) 8.6 - 10.3 mg/dL        Relevant imaging results were personally reviewed.

## 2024-02-21 NOTE — PROGRESS NOTES
02/21/24 0812   [facility] Disability Status   Are you deaf or do you have serious difficulty hearing? N   Are you blind or do you have serious difficulty seeing, even when wearing glasses? N   Because of a physical, mental, or emotional condition, do you have serious difficulty concentrating, remembering, or making decisions? (5 years old or older) Y  (ETOH abuse)   Do you have serious difficulty walking or climbing stairs? N   Do you have serious difficulty dressing or bathing? N   Because of a physical, mental, or emotional condition, do you have serious difficulty doing errands alone such as visiting the doctor? N

## 2024-02-21 NOTE — PROGRESS NOTES
Prior to Admission Medications   Prescriptions Last Dose Informant   Jardiance 10 mg 2/19/2024 Self   Sig: Take 1 tablet (10 mg) by mouth once daily.   buPROPion XL (Wellbutrin XL) 150 mg 24 hr tablet 2/19/2024 Self   Sig: Take 1 tablet (150 mg) by mouth once daily. Do not crush, chew, or split.   Patient taking differently: Take 1 tablet (150 mg) by mouth once daily. Takes with 300 mg for a total of 450 mg daily  Do not crush, chew, or split.   buPROPion XL (Wellbutrin XL) 300 mg 24 hr tablet 2/19/2024 Self   Sig: Take 1 tablet (300 mg) by mouth once daily in the morning. Take before meals.   Patient taking differently: Take 1 tablet (300 mg) by mouth once daily in the morning. Take before meals. Takes with 150 mg for a total of 450 mg daily   hydrOXYzine HCL (Atarax) 25 mg tablet  Self   Sig: Take 1 tablet (25 mg) by mouth 3 times a day as needed for anxiety.   losartan (Cozaar) 50 mg tablet  Self   Sig: Take 1 tablet (50 mg) by mouth once daily.   metFORMIN  mg 24 hr tablet 2/19/2024 Self   Sig: Take 2 tablets (1,000 mg) by mouth once daily.   propranolol (Inderal) 80 mg tablet Unknown Self   Sig: TAKE 1-2 TABLETS 3 TIMES DAILY AS NEEDED FOR PANIC AND HEART RACING   semaglutide (Ozempic) 1 mg/dose (2 mg/1.5 mL) pen injector Past Week Self   Sig: Inject 1 mg under the skin 1 (one) time per week.   Patient taking differently: Inject 1 mg under the skin 1 (one) time per week. monday      Facility-Administered Medications: None     Interviewed patient  Neal Vaca CPhT

## 2024-02-22 LAB
ANION GAP SERPL CALC-SCNC: 14 MMOL/L (ref 10–20)
BUN SERPL-MCNC: 10 MG/DL (ref 6–23)
CALCIUM SERPL-MCNC: 8 MG/DL (ref 8.6–10.3)
CHLORIDE SERPL-SCNC: 101 MMOL/L (ref 98–107)
CO2 SERPL-SCNC: 22 MMOL/L (ref 21–32)
CREAT SERPL-MCNC: 0.62 MG/DL (ref 0.5–1.3)
EGFRCR SERPLBLD CKD-EPI 2021: >90 ML/MIN/1.73M*2
ERYTHROCYTE [DISTWIDTH] IN BLOOD BY AUTOMATED COUNT: 14 % (ref 11.5–14.5)
GLUCOSE BLD MANUAL STRIP-MCNC: 133 MG/DL (ref 74–99)
GLUCOSE BLD MANUAL STRIP-MCNC: 136 MG/DL (ref 74–99)
GLUCOSE BLD MANUAL STRIP-MCNC: 145 MG/DL (ref 74–99)
GLUCOSE BLD MANUAL STRIP-MCNC: 180 MG/DL (ref 74–99)
GLUCOSE SERPL-MCNC: 145 MG/DL (ref 74–99)
HCT VFR BLD AUTO: 40.2 % (ref 41–52)
HGB BLD-MCNC: 12.8 G/DL (ref 13.5–17.5)
MCH RBC QN AUTO: 26.7 PG (ref 26–34)
MCHC RBC AUTO-ENTMCNC: 31.8 G/DL (ref 32–36)
MCV RBC AUTO: 84 FL (ref 80–100)
NRBC BLD-RTO: 0 /100 WBCS (ref 0–0)
PLATELET # BLD AUTO: 221 X10*3/UL (ref 150–450)
POTASSIUM SERPL-SCNC: 3.4 MMOL/L (ref 3.5–5.3)
RBC # BLD AUTO: 4.8 X10*6/UL (ref 4.5–5.9)
SODIUM SERPL-SCNC: 134 MMOL/L (ref 136–145)
WBC # BLD AUTO: 15 X10*3/UL (ref 4.4–11.3)

## 2024-02-22 PROCEDURE — 2500000004 HC RX 250 GENERAL PHARMACY W/ HCPCS (ALT 636 FOR OP/ED): Performed by: INTERNAL MEDICINE

## 2024-02-22 PROCEDURE — 85027 COMPLETE CBC AUTOMATED: CPT | Performed by: INTERNAL MEDICINE

## 2024-02-22 PROCEDURE — 2500000002 HC RX 250 W HCPCS SELF ADMINISTERED DRUGS (ALT 637 FOR MEDICARE OP, ALT 636 FOR OP/ED): Performed by: INTERNAL MEDICINE

## 2024-02-22 PROCEDURE — 2500000004 HC RX 250 GENERAL PHARMACY W/ HCPCS (ALT 636 FOR OP/ED)

## 2024-02-22 PROCEDURE — 36415 COLL VENOUS BLD VENIPUNCTURE: CPT | Performed by: INTERNAL MEDICINE

## 2024-02-22 PROCEDURE — 1100000001 HC PRIVATE ROOM DAILY

## 2024-02-22 PROCEDURE — 80048 BASIC METABOLIC PNL TOTAL CA: CPT | Performed by: INTERNAL MEDICINE

## 2024-02-22 PROCEDURE — 2500000001 HC RX 250 WO HCPCS SELF ADMINISTERED DRUGS (ALT 637 FOR MEDICARE OP): Performed by: PHARMACIST

## 2024-02-22 PROCEDURE — 82947 ASSAY GLUCOSE BLOOD QUANT: CPT

## 2024-02-22 PROCEDURE — 99232 SBSQ HOSP IP/OBS MODERATE 35: CPT | Performed by: INTERNAL MEDICINE

## 2024-02-22 PROCEDURE — 2500000001 HC RX 250 WO HCPCS SELF ADMINISTERED DRUGS (ALT 637 FOR MEDICARE OP): Performed by: INTERNAL MEDICINE

## 2024-02-22 RX ORDER — POTASSIUM CHLORIDE 14.9 MG/ML
20 INJECTION INTRAVENOUS
Status: COMPLETED | OUTPATIENT
Start: 2024-02-22 | End: 2024-02-22

## 2024-02-22 RX ADMIN — BUPROPION HYDROCHLORIDE 450 MG: 150 TABLET, EXTENDED RELEASE ORAL at 09:12

## 2024-02-22 RX ADMIN — PIPERACILLIN SODIUM AND TAZOBACTAM SODIUM 4.5 G: 4; .5 INJECTION, SOLUTION INTRAVENOUS at 20:15

## 2024-02-22 RX ADMIN — ENOXAPARIN SODIUM 40 MG: 40 INJECTION SUBCUTANEOUS at 20:14

## 2024-02-22 RX ADMIN — HYDROMORPHONE HYDROCHLORIDE 0.2 MG: 0.2 INJECTION, SOLUTION INTRAMUSCULAR; INTRAVENOUS; SUBCUTANEOUS at 09:10

## 2024-02-22 RX ADMIN — EMPAGLIFLOZIN 10 MG: 10 TABLET, FILM COATED ORAL at 09:12

## 2024-02-22 RX ADMIN — LOSARTAN POTASSIUM 50 MG: 50 TABLET, FILM COATED ORAL at 09:12

## 2024-02-22 RX ADMIN — PANTOPRAZOLE SODIUM 40 MG: 40 TABLET, DELAYED RELEASE ORAL at 06:04

## 2024-02-22 RX ADMIN — INSULIN LISPRO 2 UNITS: 100 INJECTION, SOLUTION INTRAVENOUS; SUBCUTANEOUS at 13:06

## 2024-02-22 RX ADMIN — PIPERACILLIN SODIUM AND TAZOBACTAM SODIUM 3.38 G: 3; .375 INJECTION, SOLUTION INTRAVENOUS at 00:50

## 2024-02-22 RX ADMIN — HYDROMORPHONE HYDROCHLORIDE 0.2 MG: 0.2 INJECTION, SOLUTION INTRAMUSCULAR; INTRAVENOUS; SUBCUTANEOUS at 22:13

## 2024-02-22 RX ADMIN — Medication 3 MG: at 20:14

## 2024-02-22 RX ADMIN — ENOXAPARIN SODIUM 40 MG: 40 INJECTION SUBCUTANEOUS at 09:11

## 2024-02-22 RX ADMIN — POTASSIUM CHLORIDE 20 MEQ: 14.9 INJECTION, SOLUTION INTRAVENOUS at 13:00

## 2024-02-22 RX ADMIN — SODIUM CHLORIDE, POTASSIUM CHLORIDE, SODIUM LACTATE AND CALCIUM CHLORIDE 150 ML/HR: 600; 310; 30; 20 INJECTION, SOLUTION INTRAVENOUS at 13:00

## 2024-02-22 RX ADMIN — HYDROMORPHONE HYDROCHLORIDE 0.2 MG: 0.2 INJECTION, SOLUTION INTRAMUSCULAR; INTRAVENOUS; SUBCUTANEOUS at 15:34

## 2024-02-22 RX ADMIN — SODIUM CHLORIDE, POTASSIUM CHLORIDE, SODIUM LACTATE AND CALCIUM CHLORIDE 150 ML/HR: 600; 310; 30; 20 INJECTION, SOLUTION INTRAVENOUS at 09:02

## 2024-02-22 RX ADMIN — PIPERACILLIN SODIUM AND TAZOBACTAM SODIUM 4.5 G: 4; .5 INJECTION, SOLUTION INTRAVENOUS at 14:33

## 2024-02-22 RX ADMIN — PIPERACILLIN SODIUM AND TAZOBACTAM SODIUM 3.38 G: 3; .375 INJECTION, SOLUTION INTRAVENOUS at 07:00

## 2024-02-22 RX ADMIN — HYDROMORPHONE HYDROCHLORIDE 0.2 MG: 0.2 INJECTION, SOLUTION INTRAMUSCULAR; INTRAVENOUS; SUBCUTANEOUS at 00:50

## 2024-02-22 RX ADMIN — POTASSIUM CHLORIDE 20 MEQ: 14.9 INJECTION, SOLUTION INTRAVENOUS at 09:02

## 2024-02-22 RX ADMIN — ONDANSETRON 4 MG: 2 INJECTION INTRAMUSCULAR; INTRAVENOUS at 18:29

## 2024-02-22 ASSESSMENT — COGNITIVE AND FUNCTIONAL STATUS - GENERAL
DAILY ACTIVITIY SCORE: 24
MOBILITY SCORE: 24

## 2024-02-22 ASSESSMENT — PAIN DESCRIPTION - DESCRIPTORS
DESCRIPTORS: ACHING

## 2024-02-22 ASSESSMENT — PAIN SCALES - GENERAL
PAINLEVEL_OUTOF10: 4
PAINLEVEL_OUTOF10: 4
PAINLEVEL_OUTOF10: 6
PAINLEVEL_OUTOF10: 5 - MODERATE PAIN
PAINLEVEL_OUTOF10: 6
PAINLEVEL_OUTOF10: 8
PAINLEVEL_OUTOF10: 6
PAINLEVEL_OUTOF10: 4

## 2024-02-22 ASSESSMENT — PAIN - FUNCTIONAL ASSESSMENT
PAIN_FUNCTIONAL_ASSESSMENT: 0-10

## 2024-02-22 ASSESSMENT — PAIN DESCRIPTION - LOCATION: LOCATION: ABDOMEN

## 2024-02-22 NOTE — PROGRESS NOTES
"Glenroy Parker is a 38 y.o. male on day 1 of admission presenting with Generalized abdominal pain.    Subjective   No acute events overnight. Feeling bloated but tolerating diet well.        Objective     VITALS  Blood pressure 108/70, pulse 86, temperature 36.3 °C (97.3 °F), resp. rate 18, height 1.88 m (6' 2\"), weight 150 kg (330 lb), SpO2 96 %.  Physical Exam  Vitals and nursing note reviewed.   Constitutional:       General: He is not in acute distress.     Appearance: Normal appearance. He is not ill-appearing.   HENT:      Head: Normocephalic and atraumatic.      Mouth/Throat:      Mouth: Mucous membranes are moist.      Pharynx: Oropharynx is clear.   Eyes:      Extraocular Movements: Extraocular movements intact.      Conjunctiva/sclera: Conjunctivae normal.   Cardiovascular:      Rate and Rhythm: Normal rate and regular rhythm.      Heart sounds: Normal heart sounds. No murmur heard.     No friction rub. No gallop.   Pulmonary:      Effort: Pulmonary effort is normal.      Breath sounds: Normal breath sounds. No wheezing or rales.   Abdominal:      General: Bowel sounds are normal. There is no distension.      Palpations: Abdomen is soft.      Tenderness: There is abdominal tenderness. There is no guarding.   Musculoskeletal:         General: No swelling or tenderness.      Right lower leg: No edema.      Left lower leg: No edema.   Skin:     General: Skin is warm and dry.      Capillary Refill: Capillary refill takes less than 2 seconds.   Neurological:      General: No focal deficit present.      Mental Status: He is alert and oriented to person, place, and time.   Psychiatric:         Mood and Affect: Mood normal.           Intake/Output last 3 Shifts:  I/O last 3 completed shifts:  In: 1850 (12.4 mL/kg) [I.V.:1650 (11 mL/kg); IV Piggyback:200]  Out: - (0 mL/kg)   Weight: 149.7 kg     Relevant Results  Results for orders placed or performed during the hospital encounter of 02/21/24 (from the past 24 " hour(s))   POCT GLUCOSE   Result Value Ref Range    POCT Glucose 174 (H) 74 - 99 mg/dL   POCT GLUCOSE   Result Value Ref Range    POCT Glucose 192 (H) 74 - 99 mg/dL   CBC   Result Value Ref Range    WBC 15.0 (H) 4.4 - 11.3 x10*3/uL    nRBC 0.0 0.0 - 0.0 /100 WBCs    RBC 4.80 4.50 - 5.90 x10*6/uL    Hemoglobin 12.8 (L) 13.5 - 17.5 g/dL    Hematocrit 40.2 (L) 41.0 - 52.0 %    MCV 84 80 - 100 fL    MCH 26.7 26.0 - 34.0 pg    MCHC 31.8 (L) 32.0 - 36.0 g/dL    RDW 14.0 11.5 - 14.5 %    Platelets 221 150 - 450 x10*3/uL   Basic metabolic panel   Result Value Ref Range    Glucose 145 (H) 74 - 99 mg/dL    Sodium 134 (L) 136 - 145 mmol/L    Potassium 3.4 (L) 3.5 - 5.3 mmol/L    Chloride 101 98 - 107 mmol/L    Bicarbonate 22 21 - 32 mmol/L    Anion Gap 14 10 - 20 mmol/L    Urea Nitrogen 10 6 - 23 mg/dL    Creatinine 0.62 0.50 - 1.30 mg/dL    eGFR >90 >60 mL/min/1.73m*2    Calcium 8.0 (L) 8.6 - 10.3 mg/dL   POCT GLUCOSE   Result Value Ref Range    POCT Glucose 145 (H) 74 - 99 mg/dL   POCT GLUCOSE   Result Value Ref Range    POCT Glucose 180 (H) 74 - 99 mg/dL       Imaging Results  CT abdomen pelvis w IV contrast   Final Result   No definite acute intra-abdominal pathology identified.   A 3 mm right lower lobe pulmonary nodule, follow-up per Fleischner   Society Guidelines.   Mild hepatic steatosis.   Colonic diverticulosis.   Fleischner Society 2017 Guidelines for Management of Incidentally   Detected Pulmonary Nodules in Adults:   A.  SOLID NODULES*   Nodule Type and Size:   Single:   *Low Risk**    < 6 mm - No routine follow-up   6-8 mm - CT at 6-12 months, then consider CT at 18-24 months   > 8 mm - Consider CT at 3 months, PET/CT, or tissue sampling   *High Risk**   < 6 mm - Optional CT at 12 months   6-8 mm - CT at 6-12 months, then CT at 18-24 months   > 8 mm - Consider CT at 3 months, PET/CT, or tissue sampling   Multiple:   *Low Risk**    < 6 mm - No routine follow-up   6-8 mm - CT at 3-6 months, then consider CT at  18-24 months   > 8 mm - CT at 3-6 months, then consider CT at 18-24 months   *High Risk**   < 6 mm - Optional CT at 12 months   6-8 mm - CT at 3-6 months, then at 18-24 months   > 8 mm - CT at 3-6 months, then at 18-24 months   B. SUBSOLID NODULES*   Nodule Type and Size:   Single:     *Ground glass   < 6 mm - No routine follow-up   > 6 mm - CT at 6-12 months to confirm persistence, then CT every 2   years until 5 years   *Part Solid   < 6 mm - No routine follow-up   > 6 mm - CT at 3-6 months to confirm persistence.  If unchanged and   solid component remains < 6 mm, annual CT should be performed for 5   years.   Multiple:   < 6 mm - CT at 3-6 months.  If stable, consider CT at 2 and 4 years.   > 6 mm - CT at 3-6 months.  Subsequent management based on the most   suspicious nodule(s).   Note - These recommendations do not apply to lung cancer screening,   patients with immunosuppression, or patients with known primary   cancer.   * Dimensions are average of long and short axes, rounded to the   nearest millimeter.   ** Consider all relevant risk factors.   Signed by Fercho Foster      XR chest 1 view   Final Result   No acute cardiopulmonary disease.   Signed by Fercho Foster      US gallbladder   Final Result   Hepatomegaly with increased hepatic parenchymal echotexture, most   commonly indicating hepatic steatosis.   No cholelithiasis, gallbladder wall thickening, or biliary dilatation   is appreciated.   Signed by Fercho Foster          Medications:  buPROPion XL, 450 mg, oral, Daily before breakfast  empagliflozin, 10 mg, oral, Daily  enoxaparin, 40 mg, subcutaneous, q12h ROBERTO CARLOS  insulin lispro, 0-10 Units, subcutaneous, TID with meals  losartan, 50 mg, oral, Daily  melatonin, 3 mg, oral, Daily  pantoprazole, 40 mg, oral, Daily before breakfast   Or  pantoprazole, 40 mg, intravenous, Daily before breakfast  piperacillin-tazobactam, 4.5 g, intravenous, q6h       PRN medications: acetaminophen **OR** acetaminophen  **OR** acetaminophen, acetaminophen, dextrose 10 % in water (D10W), dextrose, glucagon, HYDROmorphone, HYDROmorphone, hydrOXYzine HCL, LORazepam, ondansetron **OR** ondansetron        Assessment/Plan   Principal Problem:    Generalized abdominal pain    Abdominal pain with leukocytosis  -Suspect gastroenteritis   -Does have hx of pancreatitis but lipase not elevated and CT without evidence of inflammation  -Continue IVF  -Zofran  -Pain meds  -Appreciate GI recs      DM type II  -Corrective insulin   -Blood sugar checks with meals and at bedtime   -Hypoglycemic protocol      HTN  -Hold home BP meds     Tobacco abuse  -[place]ray cessation     Concern for depression, SI r/o  -Patient did discuss some sadness related to his fathers recent passing and remote hx (>20 years ago) of SI as a teenage but denies currently. No need for sitter or psych.     DVT Prophylaxis:  Lovenox subq    Disposition:  Suspect can DC tomorrow     Mac Patton, DO [facility]

## 2024-02-22 NOTE — CARE PLAN
The patient's goals for the shift include      The clinical goals for the shift include pain control    Over the shift, the patient did not make progress toward the following goals. Barriers to progression include   Problem: Risk for Suicide  Goal: Accepts medications as prescribed/needed this shift  Outcome: Progressing  Goal: Identifies supports this shift  Outcome: Progressing  Goal: Makes needs known through verbalization or behaviors this shift  Outcome: Progressing  Goal: No self harm this shift  Outcome: Progressing  Goal: Read Safety Guidelines this shift  Outcome: Progressing  Goal: Complete Mental Health Safety Plan (psychiatry only) this shift  Outcome: Progressing     Problem: Pain - Adult  Goal: Verbalizes/displays adequate comfort level or baseline comfort level  Outcome: Progressing     Problem: Safety - Adult  Goal: Free from fall injury  Outcome: Progressing     Problem: Discharge Planning  Goal: Discharge to home or other facility with appropriate resources  Outcome: Progressing     Problem: Chronic Conditions and Co-morbidities  Goal: Patient's chronic conditions and co-morbidity symptoms are monitored and maintained or improved  Outcome: Progressing     Problem: Diabetes  Goal: Achieve decreasing blood glucose levels by end of shift  Outcome: Progressing  Goal: Increase stability of blood glucose readings by end of shift  Outcome: Progressing  Goal: Decrease in ketones present in urine by end of shift  Outcome: Progressing  Goal: Maintain electrolyte levels within acceptable range throughout shift  Outcome: Progressing  Goal: Maintain glucose levels >70mg/dl to <250mg/dl throughout shift  Outcome: Progressing  Goal: No changes in neurological exam by end of shift  Outcome: Progressing  Goal: Learn about and adhere to nutrition recommendations by end of shift  Outcome: Progressing  Goal: Vital signs within normal range for age by end of shift  Outcome: Progressing  Goal: Increase self care and/or  family involovement by end of shift  Outcome: Progressing  Goal: Receive DSME education by end of shift  Outcome: Progressing   . Recommendations to address these barriers include .

## 2024-02-23 LAB
ANION GAP SERPL CALC-SCNC: 13 MMOL/L (ref 10–20)
BUN SERPL-MCNC: 8 MG/DL (ref 6–23)
CALCIUM SERPL-MCNC: 8.1 MG/DL (ref 8.6–10.3)
CHLORIDE SERPL-SCNC: 100 MMOL/L (ref 98–107)
CO2 SERPL-SCNC: 26 MMOL/L (ref 21–32)
CREAT SERPL-MCNC: 0.69 MG/DL (ref 0.5–1.3)
EGFRCR SERPLBLD CKD-EPI 2021: >90 ML/MIN/1.73M*2
ERYTHROCYTE [DISTWIDTH] IN BLOOD BY AUTOMATED COUNT: 14 % (ref 11.5–14.5)
GLUCOSE BLD MANUAL STRIP-MCNC: 111 MG/DL (ref 74–99)
GLUCOSE BLD MANUAL STRIP-MCNC: 115 MG/DL (ref 74–99)
GLUCOSE BLD MANUAL STRIP-MCNC: 129 MG/DL (ref 74–99)
GLUCOSE BLD MANUAL STRIP-MCNC: 175 MG/DL (ref 74–99)
GLUCOSE SERPL-MCNC: 110 MG/DL (ref 74–99)
HCT VFR BLD AUTO: 37.9 % (ref 41–52)
HGB BLD-MCNC: 12 G/DL (ref 13.5–17.5)
MCH RBC QN AUTO: 26.5 PG (ref 26–34)
MCHC RBC AUTO-ENTMCNC: 31.7 G/DL (ref 32–36)
MCV RBC AUTO: 84 FL (ref 80–100)
NRBC BLD-RTO: 0 /100 WBCS (ref 0–0)
PLATELET # BLD AUTO: 246 X10*3/UL (ref 150–450)
POTASSIUM SERPL-SCNC: 3.5 MMOL/L (ref 3.5–5.3)
RBC # BLD AUTO: 4.53 X10*6/UL (ref 4.5–5.9)
SODIUM SERPL-SCNC: 135 MMOL/L (ref 136–145)
WBC # BLD AUTO: 10.1 X10*3/UL (ref 4.4–11.3)

## 2024-02-23 PROCEDURE — 82374 ASSAY BLOOD CARBON DIOXIDE: CPT | Performed by: INTERNAL MEDICINE

## 2024-02-23 PROCEDURE — 2500000005 HC RX 250 GENERAL PHARMACY W/O HCPCS: Performed by: INTERNAL MEDICINE

## 2024-02-23 PROCEDURE — 2500000004 HC RX 250 GENERAL PHARMACY W/ HCPCS (ALT 636 FOR OP/ED): Performed by: INTERNAL MEDICINE

## 2024-02-23 PROCEDURE — 2500000001 HC RX 250 WO HCPCS SELF ADMINISTERED DRUGS (ALT 637 FOR MEDICARE OP): Performed by: INTERNAL MEDICINE

## 2024-02-23 PROCEDURE — 99232 SBSQ HOSP IP/OBS MODERATE 35: CPT | Performed by: INTERNAL MEDICINE

## 2024-02-23 PROCEDURE — 85027 COMPLETE CBC AUTOMATED: CPT | Performed by: INTERNAL MEDICINE

## 2024-02-23 PROCEDURE — 2500000002 HC RX 250 W HCPCS SELF ADMINISTERED DRUGS (ALT 637 FOR MEDICARE OP, ALT 636 FOR OP/ED): Performed by: INTERNAL MEDICINE

## 2024-02-23 PROCEDURE — 82947 ASSAY GLUCOSE BLOOD QUANT: CPT

## 2024-02-23 PROCEDURE — 2500000004 HC RX 250 GENERAL PHARMACY W/ HCPCS (ALT 636 FOR OP/ED)

## 2024-02-23 PROCEDURE — 2500000001 HC RX 250 WO HCPCS SELF ADMINISTERED DRUGS (ALT 637 FOR MEDICARE OP): Performed by: PHARMACIST

## 2024-02-23 PROCEDURE — 1100000001 HC PRIVATE ROOM DAILY

## 2024-02-23 RX ADMIN — PANTOPRAZOLE SODIUM 40 MG: 40 TABLET, DELAYED RELEASE ORAL at 06:00

## 2024-02-23 RX ADMIN — ENOXAPARIN SODIUM 40 MG: 40 INJECTION SUBCUTANEOUS at 08:54

## 2024-02-23 RX ADMIN — SODIUM PHOSPHATE 1 ENEMA: 7; 19 ENEMA RECTAL at 09:53

## 2024-02-23 RX ADMIN — HYDROMORPHONE HYDROCHLORIDE 0.2 MG: 0.2 INJECTION, SOLUTION INTRAMUSCULAR; INTRAVENOUS; SUBCUTANEOUS at 08:54

## 2024-02-23 RX ADMIN — PIPERACILLIN SODIUM AND TAZOBACTAM SODIUM 4.5 G: 4; .5 INJECTION, SOLUTION INTRAVENOUS at 12:27

## 2024-02-23 RX ADMIN — PIPERACILLIN SODIUM AND TAZOBACTAM SODIUM 4.5 G: 4; .5 INJECTION, SOLUTION INTRAVENOUS at 20:00

## 2024-02-23 RX ADMIN — Medication 3 MG: at 23:40

## 2024-02-23 RX ADMIN — BUPROPION HYDROCHLORIDE 450 MG: 150 TABLET, EXTENDED RELEASE ORAL at 06:00

## 2024-02-23 RX ADMIN — ONDANSETRON 4 MG: 2 INJECTION INTRAMUSCULAR; INTRAVENOUS at 23:46

## 2024-02-23 RX ADMIN — SODIUM CHLORIDE, POTASSIUM CHLORIDE, SODIUM LACTATE AND CALCIUM CHLORIDE 150 ML/HR: 600; 310; 30; 20 INJECTION, SOLUTION INTRAVENOUS at 08:59

## 2024-02-23 RX ADMIN — ENOXAPARIN SODIUM 40 MG: 40 INJECTION SUBCUTANEOUS at 21:50

## 2024-02-23 RX ADMIN — HYDROMORPHONE HYDROCHLORIDE 0.2 MG: 0.2 INJECTION, SOLUTION INTRAMUSCULAR; INTRAVENOUS; SUBCUTANEOUS at 04:10

## 2024-02-23 RX ADMIN — SODIUM CHLORIDE, POTASSIUM CHLORIDE, SODIUM LACTATE AND CALCIUM CHLORIDE 150 ML/HR: 600; 310; 30; 20 INJECTION, SOLUTION INTRAVENOUS at 12:27

## 2024-02-23 RX ADMIN — EMPAGLIFLOZIN 10 MG: 10 TABLET, FILM COATED ORAL at 08:54

## 2024-02-23 RX ADMIN — PIPERACILLIN SODIUM AND TAZOBACTAM SODIUM 4.5 G: 4; .5 INJECTION, SOLUTION INTRAVENOUS at 06:47

## 2024-02-23 RX ADMIN — PIPERACILLIN SODIUM AND TAZOBACTAM SODIUM 4.5 G: 4; .5 INJECTION, SOLUTION INTRAVENOUS at 01:24

## 2024-02-23 RX ADMIN — ONDANSETRON 4 MG: 2 INJECTION INTRAMUSCULAR; INTRAVENOUS at 08:57

## 2024-02-23 RX ADMIN — LOSARTAN POTASSIUM 50 MG: 50 TABLET, FILM COATED ORAL at 08:54

## 2024-02-23 RX ADMIN — ONDANSETRON 4 MG: 2 INJECTION INTRAMUSCULAR; INTRAVENOUS at 15:29

## 2024-02-23 ASSESSMENT — PAIN SCALES - GENERAL
PAINLEVEL_OUTOF10: 9
PAINLEVEL_OUTOF10: 5 - MODERATE PAIN
PAINLEVEL_OUTOF10: 6
PAINLEVEL_OUTOF10: 5 - MODERATE PAIN
PAINLEVEL_OUTOF10: 5 - MODERATE PAIN

## 2024-02-23 ASSESSMENT — COGNITIVE AND FUNCTIONAL STATUS - GENERAL
DAILY ACTIVITIY SCORE: 24
MOBILITY SCORE: 24

## 2024-02-23 ASSESSMENT — PAIN DESCRIPTION - DESCRIPTORS
DESCRIPTORS: ACHING

## 2024-02-23 ASSESSMENT — PAIN - FUNCTIONAL ASSESSMENT
PAIN_FUNCTIONAL_ASSESSMENT: 0-10

## 2024-02-23 NOTE — PROGRESS NOTES
2024 10:34 AM I met with patient. He lives with his fiancee. He said he usually drinks every other month but his father  recently and  he did drink a lot over the course of 5 days. He said he has not had alcohol counseling treatment before but he was agreeable to a referral to [facility]. I will make a referral to [facility]. Beti Anders [facility]

## 2024-02-23 NOTE — PROGRESS NOTES
"Glenroy Parker is a 38 y.o. male on day 2 of admission presenting with Generalized abdominal pain.    Subjective   Still very uncomfortable this AM. Notes that he feels like he needs to have a BM but is unable.         Objective     VITALS  Blood pressure 132/81, pulse 86, temperature 36.3 °C (97.4 °F), temperature source Temporal, resp. rate 16, height 1.88 m (6' 2\"), weight 150 kg (330 lb), SpO2 96 %.  Physical Exam  Vitals and nursing note reviewed.   Constitutional:       General: He is not in acute distress.     Appearance: Normal appearance. He is not ill-appearing.   HENT:      Head: Normocephalic and atraumatic.      Mouth/Throat:      Mouth: Mucous membranes are moist.      Pharynx: Oropharynx is clear.   Eyes:      Extraocular Movements: Extraocular movements intact.      Conjunctiva/sclera: Conjunctivae normal.   Cardiovascular:      Rate and Rhythm: Normal rate and regular rhythm.      Heart sounds: Normal heart sounds. No murmur heard.     No friction rub. No gallop.   Pulmonary:      Effort: Pulmonary effort is normal.      Breath sounds: Normal breath sounds. No wheezing or rales.   Abdominal:      General: Bowel sounds are normal. There is no distension.      Palpations: Abdomen is soft.      Tenderness: There is no guarding.   Musculoskeletal:         General: No swelling or tenderness.      Right lower leg: No edema.      Left lower leg: No edema.   Skin:     General: Skin is warm and dry.      Capillary Refill: Capillary refill takes less than 2 seconds.   Neurological:      General: No focal deficit present.      Mental Status: He is alert and oriented to person, place, and time.   Psychiatric:         Mood and Affect: Mood normal.           Intake/Output last 3 Shifts:  I/O last 3 completed shifts:  In: 3422.5 (22.9 mL/kg) [P.O.:580; I.V.:2592.5 (17.3 mL/kg); IV Piggyback:250]  Out: - (0 mL/kg)   Weight: 149.7 kg     Relevant Results  Results for orders placed or performed during the hospital " encounter of 02/21/24 (from the past 24 hour(s))   POCT GLUCOSE   Result Value Ref Range    POCT Glucose 133 (H) 74 - 99 mg/dL   POCT GLUCOSE   Result Value Ref Range    POCT Glucose 136 (H) 74 - 99 mg/dL   CBC   Result Value Ref Range    WBC 10.1 4.4 - 11.3 x10*3/uL    nRBC 0.0 0.0 - 0.0 /100 WBCs    RBC 4.53 4.50 - 5.90 x10*6/uL    Hemoglobin 12.0 (L) 13.5 - 17.5 g/dL    Hematocrit 37.9 (L) 41.0 - 52.0 %    MCV 84 80 - 100 fL    MCH 26.5 26.0 - 34.0 pg    MCHC 31.7 (L) 32.0 - 36.0 g/dL    RDW 14.0 11.5 - 14.5 %    Platelets 246 150 - 450 x10*3/uL   Basic Metabolic Panel   Result Value Ref Range    Glucose 110 (H) 74 - 99 mg/dL    Sodium 135 (L) 136 - 145 mmol/L    Potassium 3.5 3.5 - 5.3 mmol/L    Chloride 100 98 - 107 mmol/L    Bicarbonate 26 21 - 32 mmol/L    Anion Gap 13 10 - 20 mmol/L    Urea Nitrogen 8 6 - 23 mg/dL    Creatinine 0.69 0.50 - 1.30 mg/dL    eGFR >90 >60 mL/min/1.73m*2    Calcium 8.1 (L) 8.6 - 10.3 mg/dL   POCT GLUCOSE   Result Value Ref Range    POCT Glucose 115 (H) 74 - 99 mg/dL   POCT GLUCOSE   Result Value Ref Range    POCT Glucose 129 (H) 74 - 99 mg/dL       Imaging Results  CT abdomen pelvis w IV contrast   Final Result   No definite acute intra-abdominal pathology identified.   A 3 mm right lower lobe pulmonary nodule, follow-up per Fleischner   Society Guidelines.   Mild hepatic steatosis.   Colonic diverticulosis.   Fleischner Society 2017 Guidelines for Management of Incidentally   Detected Pulmonary Nodules in Adults:   A.  SOLID NODULES*   Nodule Type and Size:   Single:   *Low Risk**    < 6 mm - No routine follow-up   6-8 mm - CT at 6-12 months, then consider CT at 18-24 months   > 8 mm - Consider CT at 3 months, PET/CT, or tissue sampling   *High Risk**   < 6 mm - Optional CT at 12 months   6-8 mm - CT at 6-12 months, then CT at 18-24 months   > 8 mm - Consider CT at 3 months, PET/CT, or tissue sampling   Multiple:   *Low Risk**    < 6 mm - No routine follow-up   6-8 mm - CT at 3-6  months, then consider CT at 18-24 months   > 8 mm - CT at 3-6 months, then consider CT at 18-24 months   *High Risk**   < 6 mm - Optional CT at 12 months   6-8 mm - CT at 3-6 months, then at 18-24 months   > 8 mm - CT at 3-6 months, then at 18-24 months   B. SUBSOLID NODULES*   Nodule Type and Size:   Single:     *Ground glass   < 6 mm - No routine follow-up   > 6 mm - CT at 6-12 months to confirm persistence, then CT every 2   years until 5 years   *Part Solid   < 6 mm - No routine follow-up   > 6 mm - CT at 3-6 months to confirm persistence.  If unchanged and   solid component remains < 6 mm, annual CT should be performed for 5   years.   Multiple:   < 6 mm - CT at 3-6 months.  If stable, consider CT at 2 and 4 years.   > 6 mm - CT at 3-6 months.  Subsequent management based on the most   suspicious nodule(s).   Note - These recommendations do not apply to lung cancer screening,   patients with immunosuppression, or patients with known primary   cancer.   * Dimensions are average of long and short axes, rounded to the   nearest millimeter.   ** Consider all relevant risk factors.   Signed by Fercho Foster      XR chest 1 view   Final Result   No acute cardiopulmonary disease.   Signed by Fercho Foster      US gallbladder   Final Result   Hepatomegaly with increased hepatic parenchymal echotexture, most   commonly indicating hepatic steatosis.   No cholelithiasis, gallbladder wall thickening, or biliary dilatation   is appreciated.   Signed by Fercho Foster          Medications:  buPROPion XL, 450 mg, oral, Daily before breakfast  empagliflozin, 10 mg, oral, Daily  enoxaparin, 40 mg, subcutaneous, q12h ROBERTO CARLOS  insulin lispro, 0-10 Units, subcutaneous, TID with meals  losartan, 50 mg, oral, Daily  melatonin, 3 mg, oral, Daily  pantoprazole, 40 mg, oral, Daily before breakfast   Or  pantoprazole, 40 mg, intravenous, Daily before breakfast  piperacillin-tazobactam, 4.5 g, intravenous, q6h       PRN medications:  acetaminophen **OR** acetaminophen **OR** acetaminophen, acetaminophen, dextrose 10 % in water (D10W), dextrose, glucagon, HYDROmorphone, HYDROmorphone, hydrOXYzine HCL, LORazepam, ondansetron **OR** ondansetron        Assessment/Plan   Principal Problem:    Generalized abdominal pain    Abdominal pain with leukocytosis  -Suspect gastroenteritis   -Does have hx of pancreatitis but lipase not elevated and CT without evidence of inflammation  -Continue IVF  -Zofran  -Pain meds  -Appreciate GI recs   -Try enema today      DM type II  -Corrective insulin   -Blood sugar checks with meals and at bedtime   -Hypoglycemic protocol      HTN  -Hold home BP meds     Tobacco abuse  -[place] cessation     Concern for depression, SI r/o  -Patient did discuss some sadness related to his fathers recent passing and remote hx (>20 years ago) of SI as a teenage but denies currently. No need for sitter or psych.     DVT Prophylaxis:  Lovenox subq    Disposition:  Suspect later today if feeling better     Mac Patton, DO [facility]

## 2024-02-23 NOTE — PROGRESS NOTES
02/23/24 1106   Discharge Planning   Living Arrangements Friends   Support Systems Friends/neighbors   Assistance Needed Patient does not need assistance with any ADL'S   Type of Residence Private residence   Number of Stairs to Enter Residence 2   Number of Stairs Within Residence 14   Who is requesting discharge planning? Provider   Home or Post Acute Services None   Patient expects to be discharged to: Plan once medically clear is to D.C home with no needs. SW assisting with D/C planning and community resources for ETOH   Does the patient need discharge transport arranged? Yes   RoundTrip coordination needed? Yes   Has discharge transport been arranged? No

## 2024-02-24 ENCOUNTER — APPOINTMENT (OUTPATIENT)
Dept: RADIOLOGY | Facility: HOSPITAL | Age: 39
DRG: 392 | End: 2024-02-24
Payer: COMMERCIAL

## 2024-02-24 LAB
ATRIAL RATE: 126 BPM
GLUCOSE BLD MANUAL STRIP-MCNC: 126 MG/DL (ref 74–99)
GLUCOSE BLD MANUAL STRIP-MCNC: 142 MG/DL (ref 74–99)
GLUCOSE BLD MANUAL STRIP-MCNC: 148 MG/DL (ref 74–99)
GLUCOSE BLD MANUAL STRIP-MCNC: 152 MG/DL (ref 74–99)
P AXIS: 53 DEGREES
P OFFSET: 208 MS
P ONSET: 149 MS
PR INTERVAL: 150 MS
Q ONSET: 224 MS
QRS COUNT: 21 BEATS
QRS DURATION: 72 MS
QT INTERVAL: 304 MS
QTC CALCULATION(BAZETT): 440 MS
QTC FREDERICIA: 389 MS
R AXIS: 70 DEGREES
T AXIS: 59 DEGREES
T OFFSET: 376 MS
VENTRICULAR RATE: 126 BPM

## 2024-02-24 PROCEDURE — 2500000004 HC RX 250 GENERAL PHARMACY W/ HCPCS (ALT 636 FOR OP/ED): Performed by: INTERNAL MEDICINE

## 2024-02-24 PROCEDURE — 1100000001 HC PRIVATE ROOM DAILY

## 2024-02-24 PROCEDURE — 2500000002 HC RX 250 W HCPCS SELF ADMINISTERED DRUGS (ALT 637 FOR MEDICARE OP, ALT 636 FOR OP/ED): Performed by: INTERNAL MEDICINE

## 2024-02-24 PROCEDURE — 74018 RADEX ABDOMEN 1 VIEW: CPT | Performed by: RADIOLOGY

## 2024-02-24 PROCEDURE — 99232 SBSQ HOSP IP/OBS MODERATE 35: CPT | Performed by: INTERNAL MEDICINE

## 2024-02-24 PROCEDURE — 2500000001 HC RX 250 WO HCPCS SELF ADMINISTERED DRUGS (ALT 637 FOR MEDICARE OP): Performed by: PHARMACIST

## 2024-02-24 PROCEDURE — 2500000001 HC RX 250 WO HCPCS SELF ADMINISTERED DRUGS (ALT 637 FOR MEDICARE OP): Performed by: INTERNAL MEDICINE

## 2024-02-24 PROCEDURE — 82947 ASSAY GLUCOSE BLOOD QUANT: CPT

## 2024-02-24 PROCEDURE — 2500000004 HC RX 250 GENERAL PHARMACY W/ HCPCS (ALT 636 FOR OP/ED)

## 2024-02-24 PROCEDURE — 74018 RADEX ABDOMEN 1 VIEW: CPT

## 2024-02-24 RX ADMIN — EMPAGLIFLOZIN 10 MG: 10 TABLET, FILM COATED ORAL at 09:03

## 2024-02-24 RX ADMIN — HYDROMORPHONE HYDROCHLORIDE 0.6 MG: 1 INJECTION, SOLUTION INTRAMUSCULAR; INTRAVENOUS; SUBCUTANEOUS at 18:50

## 2024-02-24 RX ADMIN — ENOXAPARIN SODIUM 40 MG: 40 INJECTION SUBCUTANEOUS at 09:04

## 2024-02-24 RX ADMIN — PIPERACILLIN SODIUM AND TAZOBACTAM SODIUM 4.5 G: 4; .5 INJECTION, SOLUTION INTRAVENOUS at 01:56

## 2024-02-24 RX ADMIN — ENOXAPARIN SODIUM 40 MG: 40 INJECTION SUBCUTANEOUS at 20:00

## 2024-02-24 RX ADMIN — BUPROPION HYDROCHLORIDE 450 MG: 150 TABLET, EXTENDED RELEASE ORAL at 06:44

## 2024-02-24 RX ADMIN — Medication 3 MG: at 23:40

## 2024-02-24 RX ADMIN — HYDROMORPHONE HYDROCHLORIDE 0.6 MG: 1 INJECTION, SOLUTION INTRAMUSCULAR; INTRAVENOUS; SUBCUTANEOUS at 23:38

## 2024-02-24 RX ADMIN — HYDROMORPHONE HYDROCHLORIDE 0.6 MG: 1 INJECTION, SOLUTION INTRAMUSCULAR; INTRAVENOUS; SUBCUTANEOUS at 10:21

## 2024-02-24 RX ADMIN — ONDANSETRON 4 MG: 2 INJECTION INTRAMUSCULAR; INTRAVENOUS at 18:56

## 2024-02-24 RX ADMIN — PIPERACILLIN SODIUM AND TAZOBACTAM SODIUM 4.5 G: 4; .5 INJECTION, SOLUTION INTRAVENOUS at 09:03

## 2024-02-24 RX ADMIN — PIPERACILLIN SODIUM AND TAZOBACTAM SODIUM 4.5 G: 4; .5 INJECTION, SOLUTION INTRAVENOUS at 13:49

## 2024-02-24 RX ADMIN — LOSARTAN POTASSIUM 50 MG: 50 TABLET, FILM COATED ORAL at 09:03

## 2024-02-24 RX ADMIN — HYDROMORPHONE HYDROCHLORIDE 0.6 MG: 1 INJECTION, SOLUTION INTRAMUSCULAR; INTRAVENOUS; SUBCUTANEOUS at 14:40

## 2024-02-24 RX ADMIN — INSULIN LISPRO 2 UNITS: 100 INJECTION, SOLUTION INTRAVENOUS; SUBCUTANEOUS at 09:09

## 2024-02-24 RX ADMIN — SODIUM CHLORIDE, POTASSIUM CHLORIDE, SODIUM LACTATE AND CALCIUM CHLORIDE 75 ML/HR: 600; 310; 30; 20 INJECTION, SOLUTION INTRAVENOUS at 11:55

## 2024-02-24 RX ADMIN — PIPERACILLIN SODIUM AND TAZOBACTAM SODIUM 4.5 G: 4; .5 INJECTION, SOLUTION INTRAVENOUS at 19:56

## 2024-02-24 RX ADMIN — PANTOPRAZOLE SODIUM 40 MG: 40 TABLET, DELAYED RELEASE ORAL at 06:44

## 2024-02-24 ASSESSMENT — COGNITIVE AND FUNCTIONAL STATUS - GENERAL
MOBILITY SCORE: 24
DAILY ACTIVITIY SCORE: 24

## 2024-02-24 ASSESSMENT — PAIN SCALES - GENERAL
PAINLEVEL_OUTOF10: 7
PAINLEVEL_OUTOF10: 7
PAINLEVEL_OUTOF10: 5 - MODERATE PAIN
PAINLEVEL_OUTOF10: 8
PAINLEVEL_OUTOF10: 9
PAINLEVEL_OUTOF10: 9
PAINLEVEL_OUTOF10: 5 - MODERATE PAIN

## 2024-02-24 ASSESSMENT — PAIN - FUNCTIONAL ASSESSMENT
PAIN_FUNCTIONAL_ASSESSMENT: 0-10

## 2024-02-24 ASSESSMENT — PAIN DESCRIPTION - ORIENTATION
ORIENTATION: RIGHT
ORIENTATION: LEFT
ORIENTATION: RIGHT

## 2024-02-24 ASSESSMENT — PAIN DESCRIPTION - LOCATION
LOCATION: BACK
LOCATION: ABDOMEN
LOCATION: ABDOMEN

## 2024-02-24 NOTE — CARE PLAN
The patient's goals for the shift include      The clinical goals for the shift include Patient to remain comfortable during shift, encourage ambulation during shift      Problem: Risk for Suicide  Goal: Accepts medications as prescribed/needed this shift  Outcome: Progressing  Goal: Identifies supports this shift  Outcome: Progressing  Goal: Makes needs known through verbalization or behaviors this shift  Outcome: Progressing  Goal: No self harm this shift  Outcome: Progressing  Goal: Read Safety Guidelines this shift  Outcome: Progressing  Goal: Complete Mental Health Safety Plan (psychiatry only) this shift  Outcome: Progressing     Problem: Pain - Adult  Goal: Verbalizes/displays adequate comfort level or baseline comfort level  Outcome: Progressing     Problem: Safety - Adult  Goal: Free from fall injury  Outcome: Progressing     Problem: Discharge Planning  Goal: Discharge to home or other facility with appropriate resources  Outcome: Progressing     Problem: Chronic Conditions and Co-morbidities  Goal: Patient's chronic conditions and co-morbidity symptoms are monitored and maintained or improved  Outcome: Progressing     Problem: Diabetes  Goal: Achieve decreasing blood glucose levels by end of shift  Outcome: Progressing  Goal: Increase stability of blood glucose readings by end of shift  Outcome: Progressing  Goal: Decrease in ketones present in urine by end of shift  Outcome: Progressing  Goal: Maintain electrolyte levels within acceptable range throughout shift  Outcome: Progressing  Goal: Maintain glucose levels >70mg/dl to <250mg/dl throughout shift  Outcome: Progressing  Goal: No changes in neurological exam by end of shift  Outcome: Progressing  Goal: Learn about and adhere to nutrition recommendations by end of shift  Outcome: Progressing  Goal: Vital signs within normal range for age by end of shift  Outcome: Progressing  Goal: Increase self care and/or family involovement by end of shift  Outcome:  Progressing  Goal: Receive DSME education by end of shift  Outcome: Progressing

## 2024-02-24 NOTE — PROGRESS NOTES
"Glenroy Parker is a 38 y.o. male on day 3 of admission presenting with Generalized abdominal pain.    Subjective   Tolerating diet without major issues. Still with some abdominal pain. Has not really had a bm        Objective     VITALS  Blood pressure 126/80, pulse 70, temperature 36.4 °C (97.5 °F), temperature source Oral, resp. rate 16, height 1.88 m (6' 2\"), weight 150 kg (330 lb), SpO2 98 %.  Physical Exam  Vitals and nursing note reviewed.   Constitutional:       General: He is not in acute distress.     Appearance: Normal appearance. He is not ill-appearing.   HENT:      Head: Normocephalic and atraumatic.      Mouth/Throat:      Mouth: Mucous membranes are moist.      Pharynx: Oropharynx is clear.   Eyes:      Extraocular Movements: Extraocular movements intact.      Conjunctiva/sclera: Conjunctivae normal.   Cardiovascular:      Rate and Rhythm: Normal rate and regular rhythm.      Heart sounds: Normal heart sounds. No murmur heard.     No friction rub. No gallop.   Pulmonary:      Effort: Pulmonary effort is normal.      Breath sounds: Normal breath sounds. No wheezing or rales.   Abdominal:      General: Bowel sounds are normal. There is no distension.      Palpations: Abdomen is soft.      Tenderness: There is no guarding.   Musculoskeletal:         General: No swelling or tenderness.      Right lower leg: No edema.      Left lower leg: No edema.   Skin:     General: Skin is warm and dry.      Capillary Refill: Capillary refill takes less than 2 seconds.   Neurological:      General: No focal deficit present.      Mental Status: He is alert and oriented to person, place, and time.   Psychiatric:         Mood and Affect: Mood normal.           Intake/Output last 3 Shifts:  I/O last 3 completed shifts:  In: 4320 (28.9 mL/kg) [I.V.:3970 (26.5 mL/kg); IV Piggyback:350]  Out: - (0 mL/kg)   Weight: 149.7 kg     Relevant Results  Results for orders placed or performed during the hospital encounter of 02/21/24 " (from the past 24 hour(s))   POCT GLUCOSE   Result Value Ref Range    POCT Glucose 111 (H) 74 - 99 mg/dL   POCT GLUCOSE   Result Value Ref Range    POCT Glucose 175 (H) 74 - 99 mg/dL   POCT GLUCOSE   Result Value Ref Range    POCT Glucose 152 (H) 74 - 99 mg/dL       Imaging Results  CT abdomen pelvis w IV contrast   Final Result   No definite acute intra-abdominal pathology identified.   A 3 mm right lower lobe pulmonary nodule, follow-up per Fleischner   Society Guidelines.   Mild hepatic steatosis.   Colonic diverticulosis.   Fleischner Society 2017 Guidelines for Management of Incidentally   Detected Pulmonary Nodules in Adults:   A.  SOLID NODULES*   Nodule Type and Size:   Single:   *Low Risk**    < 6 mm - No routine follow-up   6-8 mm - CT at 6-12 months, then consider CT at 18-24 months   > 8 mm - Consider CT at 3 months, PET/CT, or tissue sampling   *High Risk**   < 6 mm - Optional CT at 12 months   6-8 mm - CT at 6-12 months, then CT at 18-24 months   > 8 mm - Consider CT at 3 months, PET/CT, or tissue sampling   Multiple:   *Low Risk**    < 6 mm - No routine follow-up   6-8 mm - CT at 3-6 months, then consider CT at 18-24 months   > 8 mm - CT at 3-6 months, then consider CT at 18-24 months   *High Risk**   < 6 mm - Optional CT at 12 months   6-8 mm - CT at 3-6 months, then at 18-24 months   > 8 mm - CT at 3-6 months, then at 18-24 months   B. SUBSOLID NODULES*   Nodule Type and Size:   Single:     *Ground glass   < 6 mm - No routine follow-up   > 6 mm - CT at 6-12 months to confirm persistence, then CT every 2   years until 5 years   *Part Solid   < 6 mm - No routine follow-up   > 6 mm - CT at 3-6 months to confirm persistence.  If unchanged and   solid component remains < 6 mm, annual CT should be performed for 5   years.   Multiple:   < 6 mm - CT at 3-6 months.  If stable, consider CT at 2 and 4 years.   > 6 mm - CT at 3-6 months.  Subsequent management based on the most   suspicious nodule(s).   Note -  These recommendations do not apply to lung cancer screening,   patients with immunosuppression, or patients with known primary   cancer.   * Dimensions are average of long and short axes, rounded to the   nearest millimeter.   ** Consider all relevant risk factors.   Signed by Fercho Foster      XR chest 1 view   Final Result   No acute cardiopulmonary disease.   Signed by Fercho Foster      US gallbladder   Final Result   Hepatomegaly with increased hepatic parenchymal echotexture, most   commonly indicating hepatic steatosis.   No cholelithiasis, gallbladder wall thickening, or biliary dilatation   is appreciated.   Signed by Fercho Foster      XR abdomen 1 view    (Results Pending)       Medications:  buPROPion XL, 450 mg, oral, Daily before breakfast  empagliflozin, 10 mg, oral, Daily  enoxaparin, 40 mg, subcutaneous, q12h ROBERTO CARLOS  insulin lispro, 0-10 Units, subcutaneous, TID with meals  losartan, 50 mg, oral, Daily  melatonin, 3 mg, oral, Daily  pantoprazole, 40 mg, oral, Daily before breakfast   Or  pantoprazole, 40 mg, intravenous, Daily before breakfast  piperacillin-tazobactam, 4.5 g, intravenous, q6h       PRN medications: acetaminophen **OR** acetaminophen **OR** acetaminophen, acetaminophen, dextrose 10 % in water (D10W), dextrose, glucagon, HYDROmorphone, HYDROmorphone, hydrOXYzine HCL, LORazepam, ondansetron **OR** ondansetron        Assessment/Plan   Principal Problem:    Generalized abdominal pain    Abdominal pain with leukocytosis  -Suspect gastroenteritis   -Does have hx of pancreatitis but lipase not elevated and CT without evidence of inflammation  -Continue IVF  -Zofran  -Pain meds  -Appreciate GI recs   -re-Try enema today   -check KUB     DM type II  -Corrective insulin   -Blood sugar checks with meals and at bedtime   -Hypoglycemic protocol      HTN  -Hold home BP meds     Tobacco abuse  -[place] cessation     Concern for depression, SI r/o  -Patient did discuss some sadness related to his fathers  recent passing and remote hx (>20 years ago) of SI as a teenage but denies currently. No need for sitter or psych.     DVT Prophylaxis:  Lovenox subq    Disposition:  Suspect later today if feeling better     Mac Patton, DO [facility]

## 2024-02-25 VITALS
RESPIRATION RATE: 16 BRPM | HEART RATE: 76 BPM | HEIGHT: 74 IN | SYSTOLIC BLOOD PRESSURE: 137 MMHG | DIASTOLIC BLOOD PRESSURE: 82 MMHG | TEMPERATURE: 97.3 F | BODY MASS INDEX: 40.43 KG/M2 | WEIGHT: 315 LBS | OXYGEN SATURATION: 96 %

## 2024-02-25 LAB
GLUCOSE BLD MANUAL STRIP-MCNC: 119 MG/DL (ref 74–99)
GLUCOSE BLD MANUAL STRIP-MCNC: 140 MG/DL (ref 74–99)

## 2024-02-25 PROCEDURE — 2500000001 HC RX 250 WO HCPCS SELF ADMINISTERED DRUGS (ALT 637 FOR MEDICARE OP): Performed by: INTERNAL MEDICINE

## 2024-02-25 PROCEDURE — 2500000004 HC RX 250 GENERAL PHARMACY W/ HCPCS (ALT 636 FOR OP/ED)

## 2024-02-25 PROCEDURE — 2500000002 HC RX 250 W HCPCS SELF ADMINISTERED DRUGS (ALT 637 FOR MEDICARE OP, ALT 636 FOR OP/ED): Performed by: INTERNAL MEDICINE

## 2024-02-25 PROCEDURE — 82947 ASSAY GLUCOSE BLOOD QUANT: CPT

## 2024-02-25 PROCEDURE — 2500000004 HC RX 250 GENERAL PHARMACY W/ HCPCS (ALT 636 FOR OP/ED): Performed by: INTERNAL MEDICINE

## 2024-02-25 PROCEDURE — 2500000001 HC RX 250 WO HCPCS SELF ADMINISTERED DRUGS (ALT 637 FOR MEDICARE OP): Performed by: PHARMACIST

## 2024-02-25 RX ORDER — BUPROPION HYDROCHLORIDE 150 MG/1
150 TABLET ORAL DAILY
Start: 2024-02-25

## 2024-02-25 RX ORDER — OXYCODONE HYDROCHLORIDE 5 MG/1
5 TABLET ORAL EVERY 4 HOURS PRN
Status: DISCONTINUED | OUTPATIENT
Start: 2024-02-25 | End: 2024-02-25 | Stop reason: HOSPADM

## 2024-02-25 RX ORDER — BUPROPION HYDROCHLORIDE 300 MG/1
300 TABLET ORAL
Start: 2024-02-25

## 2024-02-25 RX ORDER — OXYCODONE HYDROCHLORIDE 5 MG/1
5 TABLET ORAL EVERY 6 HOURS PRN
Qty: 15 TABLET | Refills: 0 | Status: SHIPPED | OUTPATIENT
Start: 2024-02-25 | End: 2024-03-05 | Stop reason: SDUPTHER

## 2024-02-25 RX ORDER — OXYCODONE HYDROCHLORIDE 5 MG/1
10 TABLET ORAL EVERY 4 HOURS PRN
Status: DISCONTINUED | OUTPATIENT
Start: 2024-02-25 | End: 2024-02-25 | Stop reason: HOSPADM

## 2024-02-25 RX ADMIN — HYDROMORPHONE HYDROCHLORIDE 0.6 MG: 1 INJECTION, SOLUTION INTRAMUSCULAR; INTRAVENOUS; SUBCUTANEOUS at 08:57

## 2024-02-25 RX ADMIN — OXYCODONE HYDROCHLORIDE 10 MG: 5 TABLET ORAL at 15:23

## 2024-02-25 RX ADMIN — PIPERACILLIN SODIUM AND TAZOBACTAM SODIUM 4.5 G: 4; .5 INJECTION, SOLUTION INTRAVENOUS at 01:52

## 2024-02-25 RX ADMIN — LOSARTAN POTASSIUM 50 MG: 50 TABLET, FILM COATED ORAL at 08:57

## 2024-02-25 RX ADMIN — BUPROPION HYDROCHLORIDE 450 MG: 150 TABLET, EXTENDED RELEASE ORAL at 07:05

## 2024-02-25 RX ADMIN — PANTOPRAZOLE SODIUM 40 MG: 40 TABLET, DELAYED RELEASE ORAL at 07:05

## 2024-02-25 RX ADMIN — HYDROMORPHONE HYDROCHLORIDE 0.6 MG: 1 INJECTION, SOLUTION INTRAMUSCULAR; INTRAVENOUS; SUBCUTANEOUS at 13:00

## 2024-02-25 RX ADMIN — ENOXAPARIN SODIUM 40 MG: 40 INJECTION SUBCUTANEOUS at 08:56

## 2024-02-25 RX ADMIN — PIPERACILLIN SODIUM AND TAZOBACTAM SODIUM 4.5 G: 4; .5 INJECTION, SOLUTION INTRAVENOUS at 13:00

## 2024-02-25 RX ADMIN — ONDANSETRON 4 MG: 2 INJECTION INTRAMUSCULAR; INTRAVENOUS at 15:23

## 2024-02-25 RX ADMIN — PIPERACILLIN SODIUM AND TAZOBACTAM SODIUM 4.5 G: 4; .5 INJECTION, SOLUTION INTRAVENOUS at 07:04

## 2024-02-25 RX ADMIN — EMPAGLIFLOZIN 10 MG: 10 TABLET, FILM COATED ORAL at 08:57

## 2024-02-25 ASSESSMENT — PAIN SCALES - GENERAL
PAINLEVEL_OUTOF10: 10 - WORST POSSIBLE PAIN
PAINLEVEL_OUTOF10: 8
PAINLEVEL_OUTOF10: 9

## 2024-02-25 ASSESSMENT — PAIN DESCRIPTION - LOCATION
LOCATION: ABDOMEN
LOCATION: ABDOMEN

## 2024-02-25 ASSESSMENT — PAIN - FUNCTIONAL ASSESSMENT
PAIN_FUNCTIONAL_ASSESSMENT: 0-10

## 2024-02-25 ASSESSMENT — PAIN DESCRIPTION - ORIENTATION
ORIENTATION: LEFT
ORIENTATION: LEFT

## 2024-02-25 NOTE — CARE PLAN
The patient's goals for the shift include      The clinical goals for the shift include Patient to possibly have bowel movement during shift      Problem: Risk for Suicide  Goal: Accepts medications as prescribed/needed this shift  Outcome: Progressing  Goal: Identifies supports this shift  Outcome: Progressing  Goal: Makes needs known through verbalization or behaviors this shift  Outcome: Progressing  Goal: No self harm this shift  Outcome: Progressing  Goal: Read Safety Guidelines this shift  Outcome: Progressing  Goal: Complete Mental Health Safety Plan (psychiatry only) this shift  Outcome: Progressing     Problem: Pain - Adult  Goal: Verbalizes/displays adequate comfort level or baseline comfort level  Outcome: Progressing     Problem: Safety - Adult  Goal: Free from fall injury  Outcome: Progressing     Problem: Discharge Planning  Goal: Discharge to home or other facility with appropriate resources  Outcome: Progressing     Problem: Chronic Conditions and Co-morbidities  Goal: Patient's chronic conditions and co-morbidity symptoms are monitored and maintained or improved  Outcome: Progressing     Problem: Diabetes  Goal: Achieve decreasing blood glucose levels by end of shift  Outcome: Progressing  Goal: Increase stability of blood glucose readings by end of shift  Outcome: Progressing  Goal: Decrease in ketones present in urine by end of shift  Outcome: Progressing  Goal: Maintain electrolyte levels within acceptable range throughout shift  Outcome: Progressing  Goal: Maintain glucose levels >70mg/dl to <250mg/dl throughout shift  Outcome: Progressing  Goal: No changes in neurological exam by end of shift  Outcome: Progressing  Goal: Learn about and adhere to nutrition recommendations by end of shift  Outcome: Progressing  Goal: Vital signs within normal range for age by end of shift  Outcome: Progressing  Goal: Increase self care and/or family involovement by end of shift  Outcome: Progressing  Goal:  Receive DSME education by end of shift  Outcome: Progressing

## 2024-02-25 NOTE — DISCHARGE SUMMARY
Discharge Diagnosis  Generalized abdominal pain    Issues Requiring Follow-Up  Follow up with PCP     Discharge Meds     Your medication list        CONTINUE taking these medications        Instructions Last Dose Given Next Dose Due   buPROPion  mg 24 hr tablet  Commonly known as: Wellbutrin XL      Take 1 tablet (150 mg) by mouth once daily. Takes with 300 mg for a total of 450 mg daily  Do not crush, chew, or split.       buPROPion  mg 24 hr tablet  Commonly known as: Wellbutrin XL      Take 1 tablet (300 mg) by mouth once daily in the morning. Take before meals. Takes with 150 mg for a total of 450 mg daily       Dexcom G7 Sensor device  Generic drug: blood-glucose sensor      3 each every 14 (fourteen) days. Change sensor       hydrOXYzine HCL 25 mg tablet  Commonly known as: Atarax           Jardiance 10 mg  Generic drug: empagliflozin      Take 1 tablet (10 mg) by mouth once daily.       losartan 50 mg tablet  Commonly known as: Cozaar      Take 1 tablet (50 mg) by mouth once daily.       metFORMIN  mg 24 hr tablet  Commonly known as: Glucophage-XR      Take 2 tablets (1,000 mg) by mouth once daily.       Ozempic 1 mg/dose (2 mg/1.5 mL) pen injector  Generic drug: semaglutide      Inject 1 mg under the skin 1 (one) time per week.       propranolol 80 mg tablet  Commonly known as: Inderal                  STOP taking these medications      Paxlovid 300 mg (150 mg x 2)-100 mg tablet therapy pack  Generic drug: nirmatrelvir-ritonavir                  Where to Get Your Medications        Information about where to get these medications is not yet available    Ask your nurse or doctor about these medications  buPROPion  mg 24 hr tablet  buPROPion  mg 24 hr tablet         Test Results Pending At Discharge  Pending Labs       Order Current Status    Extra Urine Gray Tube Collected (02/21/24 0210)    Urinalysis with Reflex Culture and Microscopic Collected (02/21/24 0210)       "      Procedures       Hospital Course   Glenroy was admitted to hospital with abdominal pain felt likely secondary to gastro viral enteritis.  He was monitored in the hospital and kept on IV fluids.  His symptoms did slowly improve though he continued to have some mild abdominal discomfort.  He was given multiple enemas to see if that would help with some mild improvement.  He is eating and drinking without difficulty.  At this time he is otherwise hemodynamic stable appropriate for discharge.  He will need to follow-up with his primary care physician.  This plan discussed with him and he is in agreement.  All questions were answered.    Blood pressure (!) 133/94, pulse 71, temperature 36.3 °C (97.3 °F), temperature source Oral, resp. rate 17, height 1.88 m (6' 2\"), weight 150 kg (330 lb), SpO2 98 %.  Pertinent Physical Exam At Time of Discharge  Physical Exam  Vitals and nursing note reviewed.   Constitutional:       General: He is not in acute distress.     Appearance: Normal appearance. He is not ill-appearing.   HENT:      Head: Normocephalic and atraumatic.      Mouth/Throat:      Mouth: Mucous membranes are moist.      Pharynx: Oropharynx is clear.   Eyes:      Extraocular Movements: Extraocular movements intact.      Conjunctiva/sclera: Conjunctivae normal.   Cardiovascular:      Rate and Rhythm: Normal rate and regular rhythm.      Heart sounds: Normal heart sounds. No murmur heard.     No friction rub. No gallop.   Pulmonary:      Effort: Pulmonary effort is normal.      Breath sounds: Normal breath sounds. No wheezing or rales.   Abdominal:      General: Bowel sounds are normal. There is no distension.      Palpations: Abdomen is soft.      Tenderness: There is no guarding.   Musculoskeletal:         General: No swelling or tenderness.      Right lower leg: No edema.      Left lower leg: No edema.   Skin:     General: Skin is warm and dry.      Capillary Refill: Capillary refill takes less than 2 seconds. "   Neurological:      General: No focal deficit present.      Mental Status: He is alert and oriented to person, place, and time.   Psychiatric:         Mood and Affect: Mood normal.         Outpatient Follow-Up  Future Appointments   Date Time Provider Department Center   4/11/2024  9:40 AM Funmilayo Hines MD [id number] Pan Patton DO [facility] [address]     Time spent during this discharge: .bdd

## 2024-02-25 NOTE — CARE PLAN
The patient's goals for the shift include      The clinical goals for the shift include pain managment     Pain on left side of abdomen has not been helped much with IV pain medications today.  Patient has begun attempting to ambulate more; currently ambulating around in the hallway.  He has been cleared for discharge and instructed to follow-up with his PCP.  He will be sent home with a prescription for a few days of pain medication as needed.      Problem: Risk for Suicide  Goal: Accepts medications as prescribed/needed this shift  Outcome: Met  Goal: Identifies supports this shift  Outcome: Met  Goal: Makes needs known through verbalization or behaviors this shift  Outcome: Met  Goal: No self harm this shift  Outcome: Met  Goal: Read Safety Guidelines this shift  Outcome: Met  Goal: Complete Mental Health Safety Plan (psychiatry only) this shift  Outcome: Met     Problem: Pain - Adult  Goal: Verbalizes/displays adequate comfort level or baseline comfort level  Outcome: Met     Problem: Safety - Adult  Goal: Free from fall injury  Outcome: Met     Problem: Discharge Planning  Goal: Discharge to home or other facility with appropriate resources  Outcome: Met     Problem: Chronic Conditions and Co-morbidities  Goal: Patient's chronic conditions and co-morbidity symptoms are monitored and maintained or improved  Outcome: Met     Problem: Diabetes  Goal: Achieve decreasing blood glucose levels by end of shift  Outcome: Met  Goal: Increase stability of blood glucose readings by end of shift  Outcome: Met  Goal: Decrease in ketones present in urine by end of shift  Outcome: Met  Goal: Maintain electrolyte levels within acceptable range throughout shift  Outcome: Met  Goal: Maintain glucose levels >70mg/dl to <250mg/dl throughout shift  Outcome: Met  Goal: No changes in neurological exam by end of shift  Outcome: Met  Goal: Learn about and adhere to nutrition recommendations by end of shift  Outcome: Met  Goal: Vital  signs within normal range for age by end of shift  Outcome: Met  Goal: Increase self care and/or family involovement by end of shift  Outcome: Met  Goal: Receive DSME education by end of shift  Outcome: Met

## 2024-02-27 ENCOUNTER — PATIENT OUTREACH (OUTPATIENT)
Dept: CARE COORDINATION | Facility: CLINIC | Age: 39
End: 2024-02-27
Payer: COMMERCIAL

## 2024-02-27 NOTE — PROGRESS NOTES
Discharge Facility: [facility]  Discharge Diagnosis: abdominal pain  Admission Date: 2/21/24  Discharge Date: 2/25/24    PCP Appointment Date: 3/7/24  Specialist Appointment Date: N/A  Hospital Encounter and Summary: Linked   See discharge assessment below for further details    Engagement  Call Start Time: 1215 (2/27/2024 12:22 PM)    Medications  Medications reviewed with patient/caregiver?: Yes (2/27/2024 12:22 PM)  Is the patient having any side effects they believe may be caused by any medication additions or changes?: No (2/27/2024 12:22 PM)  Does the patient have all medications ordered at discharge?: Yes (2/27/2024 12:22 PM)  Care Management Interventions: No intervention needed (2/27/2024 12:22 PM)  Prescription Comments: No changes to meds (2/27/2024 12:22 PM)  Is the patient taking all medications as directed (includes completed medication regime)?: Yes (2/27/2024 12:22 PM)  Care Management Interventions: Provided patient education (2/27/2024 12:22 PM)  Medication Comments: States he was supposed to be sent home with anti nausea medication but has not had any issues with nausea. (2/27/2024 12:22 PM)    Appointments  Does the patient have a primary care provider?: Yes (2/27/2024 12:22 PM)  Care Management Interventions: Verified appointment date/time/provider (2/27/2024 12:22 PM)  Has the patient kept scheduled appointments due by today?: Yes (2/27/2024 12:22 PM)    Patient Teaching  Does the patient have access to their discharge instructions?: Yes (2/27/2024 12:22 PM)  Care Management Interventions: Reviewed instructions with patient (2/27/2024 12:22 PM)  What is the patient's perception of their health status since discharge?: Improving (2/27/2024 12:22 PM)  Is the patient/caregiver able to teach back the hierarchy of who to call/visit for symptoms/problems? PCP, Specialist, Home Health nurse, Urgent Care, ED, 911: Yes (2/27/2024 12:22 PM)    Wrap Up  Wrap Up Additional Comments: Glenroy is doing much  better since he came home, no further abdominal pain noted. (2/27/2024 12:22 PM)  Call End Time: 1220 (2/27/2024 12:22 PM)

## 2024-02-29 DIAGNOSIS — R19.7 DIARRHEA, UNSPECIFIED TYPE: Primary | ICD-10-CM

## 2024-03-04 DIAGNOSIS — E11.9 DIABETES MELLITUS TYPE 2 WITHOUT RETINOPATHY (MULTI): Primary | ICD-10-CM

## 2024-03-04 RX ORDER — LISINOPRIL 5 MG/1
TABLET ORAL
COMMUNITY
Start: 2016-02-08 | End: 2024-03-05 | Stop reason: WASHOUT

## 2024-03-04 RX ORDER — GLIMEPIRIDE 2 MG/1
TABLET ORAL
COMMUNITY
Start: 2018-11-16 | End: 2024-03-05 | Stop reason: WASHOUT

## 2024-03-04 RX ORDER — OMEPRAZOLE 40 MG/1
CAPSULE, DELAYED RELEASE ORAL
COMMUNITY
Start: 2020-01-24

## 2024-03-04 RX ORDER — DAPAGLIFLOZIN 10 MG/1
TABLET, FILM COATED ORAL
COMMUNITY
Start: 2020-02-19 | End: 2024-03-05 | Stop reason: WASHOUT

## 2024-03-04 RX ORDER — PREDNISONE 20 MG/1
TABLET ORAL
COMMUNITY
Start: 2024-01-10 | End: 2024-03-05 | Stop reason: WASHOUT

## 2024-03-04 RX ORDER — TOPIRAMATE 100 MG/1
TABLET, FILM COATED ORAL
COMMUNITY
Start: 2020-02-26 | End: 2024-03-05 | Stop reason: WASHOUT

## 2024-03-04 RX ORDER — FLUTICASONE PROPIONATE 50 MCG
SPRAY, SUSPENSION (ML) NASAL
COMMUNITY
Start: 2018-11-14 | End: 2024-03-05 | Stop reason: WASHOUT

## 2024-03-05 ENCOUNTER — ANESTHESIA EVENT (OUTPATIENT)
Dept: GASTROENTEROLOGY | Facility: EXTERNAL LOCATION | Age: 39
End: 2024-03-05

## 2024-03-05 ENCOUNTER — OFFICE VISIT (OUTPATIENT)
Dept: GASTROENTEROLOGY | Facility: CLINIC | Age: 39
End: 2024-03-05
Payer: COMMERCIAL

## 2024-03-05 VITALS
HEART RATE: 103 BPM | WEIGHT: 315 LBS | DIASTOLIC BLOOD PRESSURE: 78 MMHG | TEMPERATURE: 97.7 F | HEIGHT: 75 IN | SYSTOLIC BLOOD PRESSURE: 145 MMHG | BODY MASS INDEX: 39.17 KG/M2

## 2024-03-05 DIAGNOSIS — K62.5 RECTAL BLEED: Primary | ICD-10-CM

## 2024-03-05 DIAGNOSIS — K52.9 COLITIS: ICD-10-CM

## 2024-03-05 DIAGNOSIS — R10.30 LOWER ABDOMINAL PAIN: ICD-10-CM

## 2024-03-05 DIAGNOSIS — R19.4 ENCOUNTER FOR DIAGNOSTIC COLONOSCOPY DUE TO CHANGE IN BOWEL HABITS: ICD-10-CM

## 2024-03-05 DIAGNOSIS — R10.13 EPIGASTRIC ABDOMINAL PAIN: ICD-10-CM

## 2024-03-05 PROCEDURE — 4010F ACE/ARB THERAPY RXD/TAKEN: CPT | Performed by: INTERNAL MEDICINE

## 2024-03-05 PROCEDURE — 3008F BODY MASS INDEX DOCD: CPT | Performed by: INTERNAL MEDICINE

## 2024-03-05 PROCEDURE — 3077F SYST BP >= 140 MM HG: CPT | Performed by: INTERNAL MEDICINE

## 2024-03-05 PROCEDURE — 3078F DIAST BP <80 MM HG: CPT | Performed by: INTERNAL MEDICINE

## 2024-03-05 PROCEDURE — 99213 OFFICE O/P EST LOW 20 MIN: CPT | Performed by: INTERNAL MEDICINE

## 2024-03-05 RX ORDER — SODIUM PICOSULFATE, MAGNESIUM OXIDE, AND ANHYDROUS CITRIC ACID 10; 3.5; 12 MG/160ML; G/160ML; G/160ML
1 LIQUID ORAL ONCE
Qty: 160 ML | Refills: 0 | Status: SHIPPED | OUTPATIENT
Start: 2024-03-05 | End: 2024-03-05

## 2024-03-05 RX ORDER — OXYCODONE HYDROCHLORIDE 5 MG/1
5 TABLET ORAL EVERY 6 HOURS PRN
Qty: 20 TABLET | Refills: 0 | Status: SHIPPED | OUTPATIENT
Start: 2024-03-05 | End: 2024-05-20 | Stop reason: ALTCHOICE

## 2024-03-05 ASSESSMENT — PAIN SCALES - GENERAL: PAINLEVEL: 6

## 2024-03-05 ASSESSMENT — ENCOUNTER SYMPTOMS: CONSTITUTIONAL NEGATIVE: 1

## 2024-03-05 NOTE — PROGRESS NOTES
Subjective   Patient ID: Glenroy Parker is a 38 y.o. male who presents for New Patient Visit.  Here in follow up to new symptoms and admission to [facility] for lower abdominal pain and change in bowel habits  Previously seen for idiopathic acute pancreatitis likely due to sludge since EUS was negative     Later, about 1 week after discharge he had rectal bleeding on February 28th - photo was BRBPR    Lower abdominal pain continues   Reviewed CT - no pancreatitis recurrence; colon looks thick wall to me  No  bleeding in the hospital so a colonoscopy was not performed  Still with lower abdominal pain   Using opiate sparingly to work    Overall weight is down due to Ozempic - discussed holding it for anesthesia     Mom has diverticulitis  No family history of IBD        Review of Systems   Constitutional: Negative.        Objective   Physical Exam  Constitutional:       Appearance: Normal appearance. He is obese.   Abdominal:      Palpations: Abdomen is soft.   Neurological:      Mental Status: He is alert.   Psychiatric:         Mood and Affect: Mood normal.         Behavior: Behavior normal.         Thought Content: Thought content normal.         Judgment: Judgment normal.         Assessment/Plan   Problem List Items Addressed This Visit             ICD-10-CM    Lower abdominal pain R10.30    Relevant Orders    Colonoscopy Diagnostic    Encounter for diagnostic colonoscopy due to change in bowel habits R19.4    Relevant Medications    sod picosulf-mag ox-citric ac (Clenpiq) 10 mg-3.5 gram- 12 gram/160 mL solution    Rectal bleed - Primary K62.5    Relevant Orders    Colonoscopy Diagnostic     You symptoms and CT scan look like acute colitis that may resolve.    Please schedule a colonoscopy to confirm the diagnosis and exclude other important issues in your age group.    I recommend a daily dose of Metamucil with one daily dose of a probiotic.        Emeterio Guzman, DO 03/05/24 9:57 AM

## 2024-03-05 NOTE — PATIENT INSTRUCTIONS
You symptoms and CT scan look like acute colitis that may resolve.    Please schedule a colonoscopy to confirm the diagnosis and exclude other important issues in your age group.    I recommend a daily dose of Metamucil with one daily dose of a probiotic.

## 2024-03-07 ENCOUNTER — TELEPHONE (OUTPATIENT)
Dept: GASTROENTEROLOGY | Facility: CLINIC | Age: 39
End: 2024-03-07

## 2024-03-07 ENCOUNTER — OFFICE VISIT (OUTPATIENT)
Dept: PRIMARY CARE | Facility: CLINIC | Age: 39
End: 2024-03-07
Payer: COMMERCIAL

## 2024-03-07 VITALS
WEIGHT: 315 LBS | DIASTOLIC BLOOD PRESSURE: 74 MMHG | OXYGEN SATURATION: 98 % | SYSTOLIC BLOOD PRESSURE: 128 MMHG | BODY MASS INDEX: 41 KG/M2 | HEART RATE: 98 BPM

## 2024-03-07 DIAGNOSIS — F41.8 MIXED ANXIETY DEPRESSIVE DISORDER: ICD-10-CM

## 2024-03-07 DIAGNOSIS — R10.9 ABDOMINAL PAIN, UNSPECIFIED ABDOMINAL LOCATION: Primary | ICD-10-CM

## 2024-03-07 PROCEDURE — 3074F SYST BP LT 130 MM HG: CPT | Performed by: FAMILY MEDICINE

## 2024-03-07 PROCEDURE — 3008F BODY MASS INDEX DOCD: CPT | Performed by: FAMILY MEDICINE

## 2024-03-07 PROCEDURE — 99495 TRANSJ CARE MGMT MOD F2F 14D: CPT | Performed by: FAMILY MEDICINE

## 2024-03-07 PROCEDURE — 3078F DIAST BP <80 MM HG: CPT | Performed by: FAMILY MEDICINE

## 2024-03-07 PROCEDURE — 4010F ACE/ARB THERAPY RXD/TAKEN: CPT | Performed by: FAMILY MEDICINE

## 2024-03-07 ASSESSMENT — ENCOUNTER SYMPTOMS: DEPRESSION: 1

## 2024-03-07 ASSESSMENT — PAIN SCALES - GENERAL: PAINLEVEL: 4

## 2024-03-07 NOTE — PROGRESS NOTES
Subjective   Patient ID: Glenroy Parker is a 38 y.o. male who presents for Hospital Follow-up (Hospital follow up for  severe stomach pain and dehydration. 1  ).    HPI   Patient here for follow-up from hospitalization.  He was admitted with abdominal pains.  Workup was essentially negative.  He had follow-up with GI yesterday.  He is going to have a colonoscopy.  He is doing better.  It was advised that he take Metamucil daily.  Patient complains of anxiety and mood issues.  He has been on Wellbutrin and feels this is not helping enough.  Review of Systems    Objective   /74 (BP Location: Left arm, Patient Position: Sitting, BP Cuff Size: Large adult)   Pulse 98   Wt 149 kg (328 lb)   SpO2 98%   BMI 41.00 kg/m²     Physical Exam  Alert, pleasant and in no acute distress.  Heart: Regular rate and rhythm without murmur  Lungs: Clear to auscultation  Lower extremities: No edema    Assessment/Plan   Problem List Items Addressed This Visit             ICD-10-CM    Mixed anxiety depressive disorder F41.8     Other Visit Diagnoses         Codes    Abdominal pain, unspecified abdominal location    -  Primary R10.9        Patient here for follow-up from hospitalization.  Records reviewed.  He followed up with GI yesterday.  Encourage patient to continue Metamucil daily.  Follow-up with GI for colonoscopy.  Anxiety/depression: Encourage patient to follow-up with psychiatry and psychology.  If he has problems getting established, please call us.  He is going to reach out through his health coverage to see where he can go.

## 2024-03-11 ENCOUNTER — PATIENT OUTREACH (OUTPATIENT)
Dept: CARE COORDINATION | Facility: CLINIC | Age: 39
End: 2024-03-11
Payer: COMMERCIAL

## 2024-03-11 NOTE — PROGRESS NOTES
Unable to reach patient for call back after patient's follow up appointment with PCP.   MADDISONM with call back number for patient to call if needed   If no voicemail available call attempts x 2 were made to contact the patient to assist with any questions or concerns patient may have.

## 2024-03-18 ENCOUNTER — HOSPITAL ENCOUNTER (OUTPATIENT)
Dept: GASTROENTEROLOGY | Facility: EXTERNAL LOCATION | Age: 39
Discharge: HOME | End: 2024-03-18
Payer: COMMERCIAL

## 2024-03-18 ENCOUNTER — ANESTHESIA (OUTPATIENT)
Dept: GASTROENTEROLOGY | Facility: EXTERNAL LOCATION | Age: 39
End: 2024-03-18

## 2024-03-18 VITALS
HEIGHT: 75 IN | TEMPERATURE: 98.1 F | HEART RATE: 86 BPM | DIASTOLIC BLOOD PRESSURE: 78 MMHG | WEIGHT: 315 LBS | RESPIRATION RATE: 10 BRPM | SYSTOLIC BLOOD PRESSURE: 121 MMHG | BODY MASS INDEX: 39.17 KG/M2 | OXYGEN SATURATION: 96 %

## 2024-03-18 DIAGNOSIS — K62.5 RECTAL BLEED: ICD-10-CM

## 2024-03-18 DIAGNOSIS — R10.30 LOWER ABDOMINAL PAIN: ICD-10-CM

## 2024-03-18 PROCEDURE — 88305 TISSUE EXAM BY PATHOLOGIST: CPT

## 2024-03-18 PROCEDURE — 45380 COLONOSCOPY AND BIOPSY: CPT | Performed by: INTERNAL MEDICINE

## 2024-03-18 PROCEDURE — 88305 TISSUE EXAM BY PATHOLOGIST: CPT | Performed by: PATHOLOGY

## 2024-03-18 RX ORDER — SODIUM CHLORIDE 9 MG/ML
20 INJECTION, SOLUTION INTRAVENOUS CONTINUOUS
Status: DISCONTINUED | OUTPATIENT
Start: 2024-03-18 | End: 2024-03-19 | Stop reason: HOSPADM

## 2024-03-18 RX ORDER — PROPOFOL 10 MG/ML
INJECTION, EMULSION INTRAVENOUS AS NEEDED
Status: DISCONTINUED | OUTPATIENT
Start: 2024-03-18 | End: 2024-03-18

## 2024-03-18 RX ORDER — LIDOCAINE HYDROCHLORIDE 20 MG/ML
INJECTION, SOLUTION INFILTRATION; PERINEURAL AS NEEDED
Status: DISCONTINUED | OUTPATIENT
Start: 2024-03-18 | End: 2024-03-18

## 2024-03-18 RX ORDER — ONDANSETRON HYDROCHLORIDE 2 MG/ML
4 INJECTION, SOLUTION INTRAVENOUS ONCE AS NEEDED
Status: DISCONTINUED | OUTPATIENT
Start: 2024-03-18 | End: 2024-03-19 | Stop reason: HOSPADM

## 2024-03-18 RX ADMIN — PROPOFOL 50 MG: 10 INJECTION, EMULSION INTRAVENOUS at 11:50

## 2024-03-18 RX ADMIN — PROPOFOL 200 MG: 10 INJECTION, EMULSION INTRAVENOUS at 11:37

## 2024-03-18 RX ADMIN — SODIUM CHLORIDE: 9 INJECTION, SOLUTION INTRAVENOUS at 11:32

## 2024-03-18 RX ADMIN — PROPOFOL 50 MG: 10 INJECTION, EMULSION INTRAVENOUS at 11:42

## 2024-03-18 RX ADMIN — LIDOCAINE HYDROCHLORIDE 50 MG: 20 INJECTION, SOLUTION INFILTRATION; PERINEURAL at 11:37

## 2024-03-18 RX ADMIN — PROPOFOL 50 MG: 10 INJECTION, EMULSION INTRAVENOUS at 11:39

## 2024-03-18 RX ADMIN — PROPOFOL 50 MG: 10 INJECTION, EMULSION INTRAVENOUS at 11:46

## 2024-03-18 SDOH — HEALTH STABILITY: MENTAL HEALTH: CURRENT SMOKER: 1

## 2024-03-18 ASSESSMENT — COLUMBIA-SUICIDE SEVERITY RATING SCALE - C-SSRS
6. HAVE YOU EVER DONE ANYTHING, STARTED TO DO ANYTHING, OR PREPARED TO DO ANYTHING TO END YOUR LIFE?: NO
2. HAVE YOU ACTUALLY HAD ANY THOUGHTS OF KILLING YOURSELF?: NO
6. HAVE YOU EVER DONE ANYTHING, STARTED TO DO ANYTHING, OR PREPARED TO DO ANYTHING TO END YOUR LIFE?: NO
1. IN THE PAST MONTH, HAVE YOU WISHED YOU WERE DEAD OR WISHED YOU COULD GO TO SLEEP AND NOT WAKE UP?: NO

## 2024-03-18 ASSESSMENT — PAIN - FUNCTIONAL ASSESSMENT
PAIN_FUNCTIONAL_ASSESSMENT: 0-10

## 2024-03-18 ASSESSMENT — PAIN SCALES - GENERAL
PAINLEVEL_OUTOF10: 0 - NO PAIN
PAINLEVEL_OUTOF10: 0 - NO PAIN
PAINLEVEL_OUTOF10: 6
PAIN_LEVEL: 0

## 2024-03-18 NOTE — ANESTHESIA POSTPROCEDURE EVALUATION
Patient: Glenroy Parker    Procedure Summary       Date: 03/18/24 Room / Location: [facility]    Anesthesia Start: 1132 Anesthesia Stop:     Procedure: COLONOSCOPY Diagnosis:       Lower abdominal pain      Rectal bleed    Scheduled Providers: Mac Pacheco MD; Alpa Reeves RN; Carlie Lindsay MA; CONOR Encarnacion Responsible Provider: CONOR Encarnacion    Anesthesia Type: MAC ASA Status: 3            Anesthesia Type: MAC    Vitals Value Taken Time   /64 03/18/24 1156   Temp 36.7 °C (98.1 °F) 03/18/24 1156   Pulse 83 03/18/24 1156   Resp 13 03/18/24 1156   SpO2 96 % 03/18/24 1156       Anesthesia Post Evaluation    Patient location during evaluation: bedside  Patient participation: complete - patient cannot participate  Level of consciousness: awake and responsive to verbal stimuli  Pain score: 0  Pain management: adequate  Airway patency: patent  Cardiovascular status: acceptable and hemodynamically stable  Respiratory status: acceptable  Hydration status: acceptable  Postoperative Nausea and Vomiting: none        No notable events documented.

## 2024-03-18 NOTE — H&P
Outpatient Hospital Procedure H&P    Patient Profile-Procedures  Initial Info  Patient Demographics  Name Glenroy Parker  Date of Birth 1985  MRN [id number]  Address   [address]  [facility] [id number]  [facility] [address]    Primary Phone Number [phone]  Secondary Phone Number    Young Diez    Procedure(s):  Colonoscopy    Primary contact name and number   Extended Emergency Contact Information  Primary Emergency Contact: Heidi Huitron  Home Phone: [phone]  Relation: Friend  Secondary Emergency Contact: Heidi Huitron  Home Phone: [phone]  Relation: None    General Health  Weight   Vitals:    03/18/24 1105   Weight: 145 kg (320 lb)     BMI Body mass index is 40 kg/m².    Allergies  No Known Allergies    Past Medical History   Past Medical History:   Diagnosis Date    Diabetes (CMS/HCC)     Other specified soft tissue disorders 11/16/2015    Leg swelling    Personal history of other diseases of the digestive system 08/11/2016    History of esophageal reflux       Provider assessment  Diagnosis: Rectal bleeding, abdominal pain    Medication Reviewed - yes  Prior to Admission medications    Medication Sig Start Date End Date Taking? Authorizing Provider   buPROPion XL (Wellbutrin XL) 150 mg 24 hr tablet Take 1 tablet (150 mg) by mouth once daily. Takes with 300 mg for a total of 450 mg daily  Do not crush, chew, or split. 2/25/24  Yes Mac Patton DO   buPROPion XL (Wellbutrin XL) 300 mg 24 hr tablet Take 1 tablet (300 mg) by mouth once daily in the morning. Take before meals. Takes with 150 mg for a total of 450 mg daily 2/25/24  Yes Mac Patton DO   Jardiance 10 mg Take 1 tablet (10 mg) by mouth once daily. 10/10/23  Yes Funmilayo Hines MD   losartan (Cozaar) 50 mg tablet Take 1 tablet (50 mg) by mouth once daily. 2/5/24  Yes Young Mayo DO   metFORMIN  mg 24 hr tablet Take 2 tablets (1,000 mg) by mouth once daily. 10/10/23 10/9/24  Yes Funmilayo Hines MD   omeprazole (PriLOSEC) 40 mg DR capsule Take by mouth. 1/24/20  Yes Historical Provider, MD   oxyCODONE (Roxicodone) 5 mg immediate release tablet Take 1 tablet (5 mg) by mouth every 6 hours if needed for severe pain (7 - 10). 3/5/24  Yes Emeterio Guzman, DO   semaglutide (Ozempic) 1 mg/dose (2 mg/1.5 mL) pen injector Inject 1 mg under the skin 1 (one) time per week.  Patient taking differently: Inject 1 mg under the skin 1 (one) time per week. monday 10/10/23  Yes Funmilayo Hines MD       Physical Exam  Vitals:    03/18/24 1105   BP: 116/89   Pulse: 103   Resp: 10   Temp: 37 °C (98.6 °F)        General: A&Ox3, NAD.  CV: RRR. No murmur.  Resp: CTA bilaterally. No wheezing, rhonchi or rales.   Extrem: No edema.       Oropharyngeal Classification II (hard and soft palate, upper portion of tonsils anduvula visible)  ASA PS Classification 3  Sedation Plan Deep  Procedure Plan - pre-procedural (re)assesment completed by physician:  discharge/transfer patient when discharge criteria met    Mac Pacheco MD  3/18/2024 11:30 AM

## 2024-03-18 NOTE — DISCHARGE INSTRUCTIONS
Patient Instructions Post Procedure      The anesthetics, sedatives or narcotics which were given to you today will be acting in your body for the next 24 hours, so you might feel a little sleepy or groggy.  This feeling should slowly wear off. Carefully read and follow the instructions.     You received sedation today:  - Do not drive or operate any machinery or power tools of any kind.   - No alcoholic beverages today, not even beer or wine.  - Do not make any important decisions or sign any legal documents.  - No over the counter medications that contain alcohol or that may cause drowsiness.    While it is common to experience mild to moderate abdominal distention, gas, or belching after your procedure, if any of these symptoms occur following discharge from the GI Lab or within one week of having your procedure, call the [facility] to be advised whether a visit to your nearest Urgent Care or Emergency Department is indicated.  Take this paper with you if you go.   - If you develop an allergic reaction to the medications that were given during your procedure such as difficulty breathing, rash, hives, severe nausea, vomiting or lightheadedness.  - If you experience chest pain, shortness of breath, severe abdominal pain, fevers and chills.  -If you develop signs and symptoms of bleeding such as blood in your spit, if your stools turn black, tarry, or bloody  - If you have not urinated within 8 hours following your procedure.  - If your IV site becomes painful, red, inflamed, or looks infected.    If you received a biopsy/polypectomy/sphincterotomy the following instructions apply below:  __ Do not use Aspirin containing products, non-steroidal medications or anti-coagulants for one week following your procedure. (Examples of these types of medications are: Advil, Arthrotec, Aleve, Coumadin, Ecotrin, Heparin, Ibuprofen, Indocin, Motrin, Naprosyn, Nuprin, Plavix, Vioxx, and Voltarin, or their generic  forms.  This list is not all-inclusive.  Check with your physician or pharmacist before resuming medications.)   __ Eat a soft diet today.  Avoid foods that are poorly digested for the next 24 hours.  These foods would include: nuts, beans, lettuce, red meats, and fried foods. Start with liquids and advance your diet as tolerated, gradually work up to eating solids.   __ Do not have a Barium Study or Enema for one week.    Your physician recommends the additional following instructions:    -You have a contact number available for emergencies. The signs and symptoms of potential delayed complications were discussed with you. You may return to normal activities tomorrow.  -Resume your previous diet or other if specified.  -Continue your present medications.   -We are waiting for your pathology results, if applicable.  -The findings and recommendations have been discussed with you and/or family.  - Please see Medication Reconciliation Form for new medication/medications prescribed.     If you experience any problems or have any questions following discharge from the GI Lab, please call: [phone] from 7 am- 4:30 pm.  In the event of an emergency please go to the closest Emergency Department or call Dr. Pacheco @ [phone]

## 2024-03-18 NOTE — ANESTHESIA PREPROCEDURE EVALUATION
Patient: Glenroy Parker    Procedure Information       Date/Time: 03/18/24 1130    Scheduled providers: Mac Pacheco MD; Alpa Reeves RN; Carlie Lindsay MA; SHAYY Encarnacion-CRNA    Procedure: COLONOSCOPY    Location: [facility]            Relevant Problems   Cardiovascular   (+) Benign essential hypertension   (+) HTN (hypertension)      Endocrine   (+) Diabetes mellitus type 2 without retinopathy (CMS/HCC)   (+) Diabetes mellitus, type 2 (CMS/HCC)      GI   (+) Gastroesophageal reflux disease   (+) Rectal bleed      Neuro/Psych   (+) Displacement of lumbar intervertebral disc with radiculopathy   (+) Lumbar radiculitis   (+) Mixed anxiety depressive disorder   (+) Panic attacks      Pulmonary   (+) KELLY (obstructive sleep apnea)      Musculoskeletal   (+) Displacement of lumbar intervertebral disc with radiculopathy   (+) Herniated nucleus pulposus, L4-5 left       Clinical information reviewed:   Tobacco  Allergies  Meds   Med Hx  Surg Hx   Fam Hx  Soc Hx        NPO Detail:  NPO/Void Status  Carbohydrate Drink Given Prior to Surgery? : N  Date of Last Liquid: 03/18/24  Time of Last Liquid: 0820  Date of Last Solid: 03/16/24  Time of Last Solid: 1900  Last Intake Type: Clear fluids  Time of Last Void: 1107         Physical Exam    Airway  Mallampati: II  TM distance: >3 FB  Neck ROM: full     Cardiovascular - normal exam     Dental - normal exam     Pulmonary - normal exam  Breath sounds clear to auscultation     Abdominal            Anesthesia Plan    History of general anesthesia?: yes  History of complications of general anesthesia?: no    ASA 3     MAC     The patient is a current smoker.  Patient was previously instructed to abstain from smoking on day of procedure.  Patient smoked on day of procedure.  Education provided regarding risk of obstructive sleep apnea.  intravenous induction   Anesthetic plan and risks discussed with patient.    Plan discussed with CRNA.

## 2024-03-25 LAB
LABORATORY COMMENT REPORT: NORMAL
PATH REPORT.FINAL DX SPEC: NORMAL
PATH REPORT.GROSS SPEC: NORMAL
PATH REPORT.TOTAL CANCER: NORMAL

## 2024-04-11 ENCOUNTER — OFFICE VISIT (OUTPATIENT)
Dept: ENDOCRINOLOGY | Facility: CLINIC | Age: 39
End: 2024-04-11
Payer: COMMERCIAL

## 2024-04-11 VITALS
WEIGHT: 315 LBS | DIASTOLIC BLOOD PRESSURE: 96 MMHG | HEIGHT: 75 IN | TEMPERATURE: 97.9 F | SYSTOLIC BLOOD PRESSURE: 151 MMHG | BODY MASS INDEX: 39.17 KG/M2 | HEART RATE: 71 BPM

## 2024-04-11 DIAGNOSIS — E11.65 TYPE 2 DIABETES MELLITUS WITH HYPERGLYCEMIA, WITH LONG-TERM CURRENT USE OF INSULIN (MULTI): ICD-10-CM

## 2024-04-11 DIAGNOSIS — Z79.4 TYPE 2 DIABETES MELLITUS WITH HYPERGLYCEMIA, WITH LONG-TERM CURRENT USE OF INSULIN (MULTI): ICD-10-CM

## 2024-04-11 PROCEDURE — 3080F DIAST BP >= 90 MM HG: CPT | Performed by: STUDENT IN AN ORGANIZED HEALTH CARE EDUCATION/TRAINING PROGRAM

## 2024-04-11 PROCEDURE — 99213 OFFICE O/P EST LOW 20 MIN: CPT | Performed by: STUDENT IN AN ORGANIZED HEALTH CARE EDUCATION/TRAINING PROGRAM

## 2024-04-11 PROCEDURE — 4010F ACE/ARB THERAPY RXD/TAKEN: CPT | Performed by: STUDENT IN AN ORGANIZED HEALTH CARE EDUCATION/TRAINING PROGRAM

## 2024-04-11 PROCEDURE — 3077F SYST BP >= 140 MM HG: CPT | Performed by: STUDENT IN AN ORGANIZED HEALTH CARE EDUCATION/TRAINING PROGRAM

## 2024-04-11 PROCEDURE — 3008F BODY MASS INDEX DOCD: CPT | Performed by: STUDENT IN AN ORGANIZED HEALTH CARE EDUCATION/TRAINING PROGRAM

## 2024-04-11 RX ORDER — EMPAGLIFLOZIN 10 MG/1
10 TABLET, FILM COATED ORAL DAILY
Qty: 90 TABLET | Refills: 2 | Status: SHIPPED | OUTPATIENT
Start: 2024-04-11

## 2024-04-11 RX ORDER — SEMAGLUTIDE 1.34 MG/ML
1 INJECTION, SOLUTION SUBCUTANEOUS
Qty: 3 ML | Refills: 6 | Status: SHIPPED | OUTPATIENT
Start: 2024-04-14

## 2024-04-11 RX ORDER — METFORMIN HYDROCHLORIDE 500 MG/1
1000 TABLET, EXTENDED RELEASE ORAL DAILY
Qty: 180 TABLET | Refills: 3 | Status: SHIPPED | OUTPATIENT
Start: 2024-04-11 | End: 2025-04-11

## 2024-04-11 NOTE — PATIENT INSTRUCTIONS
Restart ozempic 0.5mg weekly then increase to 1mg     Get your labs done, fasting, when you're back on the 1mg dose     Follow up in 4-5 months    Funmilayo Hines MD  Divison of Endocrinology   [facility]   Phone: [phone]    option 4, then option 1  Fax: [phone]

## 2024-04-11 NOTE — PROGRESS NOTES
36-year old male PMH: KELLY, gallstone pancreatitis, HTN, Obesity, with diabetes coming in today for DM follow up      Duration of type 2 diabetes: dx 2018   Complicated by neuropathy, albuminuria     Current Regimen        MTF 1g daily   jardiance 10mg daily   Ozempic 1mg -off for about 3-4 months    Previously:   Glipizide  Novolog 70/30     History of pancreatitis attributed to etoh/gallstone, seeing GI, no gallstone on work up    SMBG- has dexcom G7 but having issues with sensor currently not checking BGs     Diet-non compliant   sedentary lifestyle      Screening and Comorbidities   Last eye exam per patient done in 2023, stable DR  foot exam needs  Obesity   HLD- no statin lipid profile improved from weight loss   on Losartan      PMH: as above   Fmxh: non contributory  Social: works in a bank, rare etoh     Weight gain  12 Point ROS reviewed and is negative, pertinent findings noted on HPI  Physical Exam  Constitutional:       Appearance: Normal appearance.   Cardiovascular:      Rate and Rhythm: Normal rate.   Abdominal:      Palpations: Abdomen is soft.      Comments: Abdominal obesity   Musculoskeletal:         General: Normal range of motion.      Cervical back: Normal range of motion and neck supple.      Comments: Venous stasis dermatitis on lower extremity   Skin:     General: Skin is warm.      Comments: No lipodystrophy   Neurological:      General: No focal deficit present.      Mental Status: He is alert and oriented to person, place, and time.         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  Was not using his ozempic.  He also regained some weight     Restart ozempic to titrate to 1mg weekly  Once he is back on the 1mg dose will recheck labs  Has a hx of pancreatitis attributed to etoh and gallstone, GI ok with GLP1 to improve metabolic profile     He is not on a statin, he was able to get to LDL and TG goal with lifestyle changes    Continue jardiance and metformin     Follow up in about 4-5months

## 2024-04-12 ENCOUNTER — OFFICE VISIT (OUTPATIENT)
Dept: BEHAVIORAL HEALTH | Facility: CLINIC | Age: 39
End: 2024-04-12
Payer: COMMERCIAL

## 2024-04-12 DIAGNOSIS — F41.1 GAD (GENERALIZED ANXIETY DISORDER): ICD-10-CM

## 2024-04-12 DIAGNOSIS — F41.9 ANXIETY: ICD-10-CM

## 2024-04-12 DIAGNOSIS — F34.1 PERSISTENT DEPRESSIVE DISORDER: ICD-10-CM

## 2024-04-12 PROCEDURE — 90791 PSYCH DIAGNOSTIC EVALUATION: CPT | Performed by: PSYCHOLOGIST
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
## 2024-04-16 ENCOUNTER — PATIENT OUTREACH (OUTPATIENT)
Dept: CARE COORDINATION | Facility: CLINIC | Age: 39
End: 2024-04-16
Payer: COMMERCIAL

## 2024-04-16 NOTE — PROGRESS NOTES
Successful outreach to patient regarding hospitalization as patient continues TCM program.   At time of outreach call the patient feels as if their condition has slightly improved since initial visit with PCP or specialist.  Questions or concerns addressed at this time with patient.   Provided contact information to patient if any further non-emergent needs arise.  Encouraged to follow up with GI if his abdominal discomfort continues, did not show any findings on the colonoscopy report and is taking metamucil daily.

## 2024-04-20 ENCOUNTER — OFFICE VISIT (OUTPATIENT)
Dept: BEHAVIORAL HEALTH | Facility: CLINIC | Age: 39
End: 2024-04-20
Payer: COMMERCIAL

## 2024-04-20 DIAGNOSIS — F34.1 PERSISTENT DEPRESSIVE DISORDER: ICD-10-CM

## 2024-04-20 DIAGNOSIS — F41.1 GAD (GENERALIZED ANXIETY DISORDER): ICD-10-CM

## 2024-04-20 PROCEDURE — 90837 PSYTX W PT 60 MINUTES: CPT | Performed by: PSYCHOLOGIST

## 2024-04-20 PROCEDURE — 4010F ACE/ARB THERAPY RXD/TAKEN: CPT | Performed by: PSYCHOLOGIST

## 2024-04-20 PROCEDURE — 3008F BODY MASS INDEX DOCD: CPT | Performed by: PSYCHOLOGIST

## 2024-04-20 NOTE — PROGRESS NOTES
8 AM 76696 Indiv Psych for Persistent Depressive Dx and KAROL (60 min,[phone])  In-person. Supportive/CBT. Didn't receive materials I sent him. Discussed goals, improve depression, anger. Education about cognitive model and behavioral activation approach. Discussed situation of his to illustrate ABCD, situation involved is friend reaching out b/c neighbor suicided and patient recalled how friend wasn't there for him when his father  a few months ago, didn't come to wake/ because his son was sick. Discussed goal of Dispute column. Discussed his telling father he loved him, visiting when getting cancer tx. Discussed how it often takes a long time to grieve a loss. Didn't discuss job situation. Is scheduled to meet with a pharmacologist in department. Next: 1 week.    Assessment/Plan


Assessment/Plan


(1) Chronic abdominal pain


(2) Chronic pancreatitis


(3) Crohn's disease 


(4) Herniated nucleus pulposus, lumbar


(5) Lumbar spondylosis


(6) Radiculopathy of lumbar region


(7) Lung mass


Pt will be continued on Norco and pt has intrathecal pump.


HOLD OPIOIDS FOR OVERSEDATION OR SBP<90 OR DBP<60 OR O2SAT<92% OR RR<12


Pt was d/w Dr. Shetty and he concurred.





Subjective


Date patient seen:  May 6, 2018


Time patient seen:  10:00 - am


ROS Limited/Unobtainable:  Yes


Allergies:  


Coded Allergies:  


     No Known Allergies (Verified , 10/27/06)


Subjective


Family at bed side. Face mask applied. No signs of distress or pain.





Objective





Last 24 Hour Vital Signs








  Date Time  Temp Pulse Resp B/P (MAP) Pulse Ox O2 Delivery O2 Flow Rate FiO2


 


5/6/18 10:16  97 18  98 Non-Rebreather 15.0 100


 


5/6/18 10:07  98 20  99 Non-Rebreather 15.0 100


 


5/6/18 08:00       15.0 


 


5/6/18 08:00 97.3 92 16 103/68 98 Non-Rebreather 15.0 





 97.3       


 


5/6/18 07:45  94      


 


5/6/18 06:59  102 22  98 Non-Rebreather 15.0 100


 


5/6/18 06:48     98 Non-Rebreather 15.0 100


 


5/6/18 06:48  100 22  98 Non-Rebreather 15.0 100


 


5/6/18 06:48      Non-Rebreather 15.0 100


 


5/6/18 04:00 97.7 105 20 116/73 97 Non-Rebreather 15.0 





 97.7       


 


5/6/18 04:00       15.0 100


 


5/6/18 04:00  103      


 


5/6/18 03:01  108 20  95 Non-Rebreather 15.0 100


 


5/6/18 02:47        100


 


5/6/18 02:47  108 20  95 Non-Rebreather 15.0 100


 


5/6/18 00:00       15.0 100


 


5/6/18 00:00  106      


 


5/6/18 00:00 98.1 104 20 106/64 100 Non-Rebreather 15.0 100





 98.1       


 


5/5/18 23:27  108 24  100 Non-Rebreather 15.0 100


 


5/5/18 23:21        100


 


5/5/18 23:20  108 24  100 Non-Rebreather 15.0 100


 


5/5/18 20:00  103      


 


5/5/18 20:00 97.5 109 24 106/64 98 Non-Rebreather 15.0 100





 97.5       


 


5/5/18 20:00       15.0 100


 


5/5/18 19:32  103 24  96 Non-Rebreather 15.0 100


 


5/5/18 19:25        100


 


5/5/18 19:24  103 24  96 Non-Rebreather 15.0 100


 


5/5/18 16:05  112 24  99 Non-Rebreather 15.0 100


 


5/5/18 16:00  116      


 


5/5/18 16:00 98.4 116 22 108/68 100 Venturi Mask  55





 98.4       


 


5/5/18 16:00       15.0 


 


5/5/18 15:55  111 22  96 Non-Rebreather 15.0 100


 


5/5/18 13:17  56 22   Non-Rebreather 15.0 100


 


5/5/18 12:05 98.0       


 


5/5/18 12:00 97.7 59 26 105/67 90 Venturi Mask  55





 97.7       


 


5/5/18 12:00       15.0 


 


5/5/18 11:56  121      


 


5/5/18 11:50  53 22  97 Non-Rebreather 15.0 100


 


5/5/18 11:40  53 24  90 Venturi Mask 15.0 55

















Intake and Output  


 


 5/5/18 5/6/18





 19:00 07:00


 


Intake Total 1438.300 ml 700 ml


 


Balance 1438.300 ml 700 ml


 


  


 


IV Total 1438.300 ml 700 ml


 


# Voids 1 2








Laboratory Tests


5/6/18 03:40: 


White Blood Count 16.3H, Red Blood Count 4.03L, Hemoglobin 11.3L, Hematocrit 

36.9L, Mean Corpuscular Volume 91, Mean Corpuscular Hemoglobin 28.0, Mean 

Corpuscular Hemoglobin Concent 30.6L, Red Cell Distribution Width 15.2H, 

Platelet Count 70L, Mean Platelet Volume 9.4, Neutrophils (%) (Auto) , 

Lymphocytes (%) (Auto) , Monocytes (%) (Auto) , Eosinophils (%) (Auto) , 

Basophils (%) (Auto) , Differential Total Cells Counted 100, Neutrophils % (

Manual) 83H, Lymphocytes % (Manual) 12L, Monocytes % (Manual) 1, Eosinophils % (

Manual) 1, Basophils % (Manual) 0, Band Neutrophils 3, Platelet Estimate 

DecreasedL, Platelet Morphology Normal, Hypochromasia 1+, Anisocytosis 1+, 

Sodium Level 160H, Potassium Level 3.6, Chloride Level 120H, Carbon Dioxide 

Level 30, Anion Gap 10, Blood Urea Nitrogen 73H, Creatinine 2.0H, Estimat 

Glomerular Filtration Rate 30.4, Glucose Level 130H, Calcium Level 9.2


Height (Feet):  5


Height (Inches):  6.00


Weight (Pounds):  129


Objective


General Appearance:  on Bipap


Neck:  normal alignment, supple


Cardiovascular:  tachycardic


Respiratory/Chest:  decreased breath sounds


Abdomen:  tender, distended, intrathecal pump palpated


Extremities:  non-tender


Edema:  no edema noted











LEA BRAVO May 6, 2018 11:08

## 2024-04-26 ENCOUNTER — OFFICE VISIT (OUTPATIENT)
Dept: BEHAVIORAL HEALTH | Facility: CLINIC | Age: 39
End: 2024-04-26
Payer: COMMERCIAL

## 2024-04-26 DIAGNOSIS — F34.1 PERSISTENT DEPRESSIVE DISORDER: ICD-10-CM

## 2024-04-26 DIAGNOSIS — F41.1 GAD (GENERALIZED ANXIETY DISORDER): ICD-10-CM

## 2024-04-26 PROCEDURE — 4010F ACE/ARB THERAPY RXD/TAKEN: CPT | Performed by: PSYCHOLOGIST

## 2024-04-26 PROCEDURE — 90837 PSYTX W PT 60 MINUTES: CPT | Performed by: PSYCHOLOGIST

## 2024-04-27 NOTE — PROGRESS NOTES
9 AM [id number] Indiv Psych for Persistent Depressive Dx and KAROL (60 min, [phone])  In-person. Supportive/CBT. Discussed activities of accomplishment, got yard seeded, getting into some home repair, hopes to get transmission lines in car changed. Discussed job today, applying for some other jobs, not satisfied in current position, wants to also make more money. 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 Discussed other changes he can make, agreed to try and use CPAP consistently for awhile. Next: 1 week.

## 2024-05-03 ENCOUNTER — OFFICE VISIT (OUTPATIENT)
Dept: BEHAVIORAL HEALTH | Facility: CLINIC | Age: 39
End: 2024-05-03
Payer: COMMERCIAL

## 2024-05-03 DIAGNOSIS — F34.1 PERSISTENT DEPRESSIVE DISORDER: ICD-10-CM

## 2024-05-03 DIAGNOSIS — F41.1 GAD (GENERALIZED ANXIETY DISORDER): ICD-10-CM

## 2024-05-03 PROCEDURE — 4010F ACE/ARB THERAPY RXD/TAKEN: CPT | Performed by: PSYCHOLOGIST

## 2024-05-03 PROCEDURE — 90837 PSYTX W PT 60 MINUTES: CPT | Performed by: PSYCHOLOGIST

## 2024-05-03 NOTE — PROGRESS NOTES
"9 AM 57763 Indiv Psych for Persistent Depressive Dx and KAROL (60 min, [phone])  In-person. Supportive/CBT. Doing a lot of outdoor work, mulch, etc. Discussed instances where he can be more patient with wife. \"Constantly think of dad\", makes him feel depressed. Mom reaching out a lot for help. Found that grandmother (dad's step-mom) has cancer, getting surgery. Discussed importance of daily structure, identifying a few tasks to do each day. Discussed ways to possibly take back control to feel better (CPAP, stretching back/core, exercise). Emphasized again importance of catching self as emotions get stronger, identifying thoughts and responding to. Wants to set aside time for job search, procrastinates. Next: 1 week.   "

## 2024-05-10 ENCOUNTER — APPOINTMENT (OUTPATIENT)
Dept: BEHAVIORAL HEALTH | Facility: CLINIC | Age: 39
End: 2024-05-10
Payer: COMMERCIAL

## 2024-05-17 ENCOUNTER — APPOINTMENT (OUTPATIENT)
Dept: BEHAVIORAL HEALTH | Facility: CLINIC | Age: 39
End: 2024-05-17
Payer: COMMERCIAL

## 2024-05-20 ENCOUNTER — OFFICE VISIT (OUTPATIENT)
Dept: BEHAVIORAL HEALTH | Facility: CLINIC | Age: 39
End: 2024-05-20
Payer: COMMERCIAL

## 2024-05-20 VITALS
HEIGHT: 75 IN | SYSTOLIC BLOOD PRESSURE: 147 MMHG | HEART RATE: 97 BPM | WEIGHT: 315 LBS | DIASTOLIC BLOOD PRESSURE: 92 MMHG | BODY MASS INDEX: 39.17 KG/M2

## 2024-05-20 DIAGNOSIS — G47.9 SLEEP DIFFICULTIES: ICD-10-CM

## 2024-05-20 DIAGNOSIS — F41.1 GAD (GENERALIZED ANXIETY DISORDER): ICD-10-CM

## 2024-05-20 DIAGNOSIS — F34.1 PERSISTENT DEPRESSIVE DISORDER: ICD-10-CM

## 2024-05-20 PROCEDURE — 3077F SYST BP >= 140 MM HG: CPT

## 2024-05-20 PROCEDURE — 4010F ACE/ARB THERAPY RXD/TAKEN: CPT

## 2024-05-20 PROCEDURE — 3080F DIAST BP >= 90 MM HG: CPT

## 2024-05-20 PROCEDURE — 99205 OFFICE O/P NEW HI 60 MIN: CPT

## 2024-05-20 RX ORDER — FLUOXETINE HYDROCHLORIDE 20 MG/1
20 CAPSULE ORAL DAILY
Qty: 30 CAPSULE | Refills: 11 | Status: SHIPPED | OUTPATIENT
Start: 2024-05-20 | End: 2024-06-10

## 2024-05-20 RX ORDER — TRAZODONE HYDROCHLORIDE 100 MG/1
100 TABLET ORAL NIGHTLY PRN
Qty: 30 TABLET | Refills: 2 | Status: SHIPPED | OUTPATIENT
Start: 2024-05-20 | End: 2024-06-10 | Stop reason: SDUPTHER

## 2024-05-20 ASSESSMENT — PATIENT HEALTH QUESTIONNAIRE - PHQ9
10. IF YOU CHECKED OFF ANY PROBLEMS, HOW DIFFICULT HAVE THESE PROBLEMS MADE IT FOR YOU TO DO YOUR WORK, TAKE CARE OF THINGS AT HOME, OR GET ALONG WITH OTHER PEOPLE: VERY DIFFICULT
4. FEELING TIRED OR HAVING LITTLE ENERGY: NEARLY EVERY DAY
9. THOUGHTS THAT YOU WOULD BE BETTER OFF DEAD, OR OF HURTING YOURSELF: NOT AT ALL
8. MOVING OR SPEAKING SO SLOWLY THAT OTHER PEOPLE COULD HAVE NOTICED. OR THE OPPOSITE, BEING SO FIGETY OR RESTLESS THAT YOU HAVE BEEN MOVING AROUND A LOT MORE THAN USUAL: NOT AT ALL
5. POOR APPETITE OR OVEREATING: NEARLY EVERY DAY
3. TROUBLE FALLING OR STAYING ASLEEP OR SLEEPING TOO MUCH: NEARLY EVERY DAY
6. FEELING BAD ABOUT YOURSELF - OR THAT YOU ARE A FAILURE OR HAVE LET YOURSELF OR YOUR FAMILY DOWN: NEARLY EVERY DAY
2. FEELING DOWN, DEPRESSED OR HOPELESS: NEARLY EVERY DAY
7. TROUBLE CONCENTRATING ON THINGS, SUCH AS READING THE NEWSPAPER OR WATCHING TELEVISION: NEARLY EVERY DAY

## 2024-05-20 ASSESSMENT — ANXIETY QUESTIONNAIRES
GAD7 TOTAL SCORE: 11
1. FEELING NERVOUS, ANXIOUS, OR ON EDGE: SEVERAL DAYS
3. WORRYING TOO MUCH ABOUT DIFFERENT THINGS: NEARLY EVERY DAY
6. BECOMING EASILY ANNOYED OR IRRITABLE: MORE THAN HALF THE DAYS
2. NOT BEING ABLE TO STOP OR CONTROL WORRYING: SEVERAL DAYS
7. FEELING AFRAID AS IF SOMETHING AWFUL MIGHT HAPPEN: NOT AT ALL
5. BEING SO RESTLESS THAT IT IS HARD TO SIT STILL: NEARLY EVERY DAY
4. TROUBLE RELAXING: SEVERAL DAYS
IF YOU CHECKED OFF ANY PROBLEMS ON THIS QUESTIONNAIRE, HOW DIFFICULT HAVE THESE PROBLEMS MADE IT FOR YOU TO DO YOUR WORK, TAKE CARE OF THINGS AT HOME, OR GET ALONG WITH OTHER PEOPLE: VERY DIFFICULT

## 2024-05-20 NOTE — PROGRESS NOTES
HPI  Glenroy Parker is a 38 y.o. male patient with a CC Anxiety and Depression presenting to outpatient treatment for a scheduled psych outpatient psychiatric evaluation.   Pt identify self by name, , and address     Reports a hx of KAROL and MDD since (), dx/tx by PCP, referred to a virtual provider to continued medication but stopped responding and insurance stopped covering virtual visit.     Struggled with significant lack of motivation to work or complete ADLs, difficulty going to work, coupled with worrying about everything, now struggle with anxiety since losing dad 2-3 months ago. Started counseling with Dr. Marquez about a month ago for ongoing anxiety, depression, and concentration. States Dr. Marquez referred him to this provider.     Reports being diagnosed with ADHD in childhood per mom but never medicated. States he was enrolled into an IEP program growing for learning disabilities. States he is easily distracted and easily loses focus, sometimes inattentive.     Current S/Sx:  -Mood swings: mood swings, shops recklessly  -Depressive mood: see PHQ-9  -Fatigue/Energy: nearly daily  -Feeling hope/help/worthless: yes  -Sleep: sleeps 3-5yrs per night.   -Motivation: low  -Appetite/Weight Changes: increased appetite, gained about 30 lbs in the past 6 months  -Psychosis: denies hallucinations/delusions  -SI/HI: Denies current suicidal intent/plan  -Guns/Weapons at home: guns in the house, locked up in a safe place     -Worry excessively: present, impactful  -Difficulty controlling worry: present, impactful  -Easily fatigued d/t worry: present, impactful  -Poor concentration d/t worry: present, impactful  -Sleep disturbance d/t worry: present, impactful  -Easy irritability d/t worry: present, impactful  -Restless / feeling on edge d/t worry: present, impactful     Panic attacks  Onset: about a 1/5 yrs ago, duration: 20 - 30 mins, frequency: had 2 so far, associated s/sx: heart racing, shaky, sweating,  "fidgeting, relieving factors: unsure, time, precipitating factors: large crowds, leaving the house (since 2021) with the pandemic, last one: last year     HISTORY  PSYCH HISTORY  -Psych Hx: KAROL, MDD  -Psych Hospitalization Hx: denies  -Suicide Attempt Hx: tried to hang self as a teenager x1 (\"realized it was stupid.\")  -Self-Harm/Violence Hx: denies  -Current psych meds: Wellbutrin  mg daily, Hydroxyzine 25 mg BID as needed (causes drowsiness)  -Psych Med Hx: Wellbutrin  mg daily, Hydroxyzine 25 mg as needed, Abilify 5 mg daily (didn't feel any different), Lamotrigine 200 mg daily (didn't feel any different), Mirtazapine 7.5 mg at bedtime, Klonopin 1 mg (didn't help), Trazodone 50 mg (ineffective)     SUBSTANCE USE HISTORY  -Substance Use Hx: denies  -ETOH: occasionally  -Tobacco: about 1 ppd since 16 - 17 yrs now  -Caffeine: 2-5 cups coffee/day depending of level of fatigue  -Substance Abuse Treatment Hx: denies     FAMILY HISTORY  -Family Psych Hx: depression  -Family Suicide Hx: denies  -Family Substance Abuse Hx: dad: alcoholic     SOCIAL HISTORY  -Upbringing: Grew up with both parents. 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       PHYSICAL EXAM  Physical Exam  Psychiatric:         Attention and Perception: Attention and perception normal.         Mood and Affect: Mood and affect normal.         Speech: Speech normal.         Behavior: Behavior normal. Behavior is cooperative.         Thought Content: Thought content normal.         Cognition and Memory: Cognition and memory normal.        "   IMPRESSION  Anxiety and Depression     Notes moderate anxiety and severe depression. Notes some mood swings. No hallucinations/delusion/maki/hypomania/SI reported, though, patient reports some reckless behaviors/tendencies. Despite +ve signs of bipolar per MDQ, patient's symptoms seem to be more unipolar depression. Appetite is markedly increased and sleeps poorly. No side effects or substance use concerns at this time. Discussed to switch off Wellbutrin d/t ineffectiveness and switch to Prozac to more efficiently manage both anxiety and depression. Will refer patient to Dr. Marquez to be evaluated for ADHD given ongoing difficulty with attention and hx of childhood dx.      SI/HI ASSESSMENT  -Risk Assessment: Glenroy Parker is currently a medium chronic risk of suicide and self-harm due to past suicide attempt(s) but not currently endorsing thoughts of suicide.   -Suicidal Risk Factors: male, unmarried/single, prior suicide attempt(s), chronic medical illness, access to weapons, and panic attacks  -Protective Factors: strong coping skills, social support/connectedness, moral objections to suicide, hopefulness/future orientation, marriage/partnership, and employment  -Plan to Reduce Risk: increase coping skills .     PLAN  Reviewed diagnostic impression including subjective and objective data and provided education about Panic attacks, MDD, KAROL, bipolar disorder, and ADHD, etiology, treatment recommendations including medication, therapy, course of treatment and prognosis. Patient amenable to treatment plan.      Dx: MDD, KAROL, r/o ADHD, r/o bipolar 2 d/o  START Prozac 20 mg daily  START Trazodone 100 mg daily at bedtime prn for sleep  DECREASE Wellbutrin  mg x 3 days, then 150 mg x 4 days, then STOP     Reviewed r/b/a, possible side effects of the medication. Client is aware about the benefit outweighs the risk.     Psychotherapy: weekly counseling with Dr. Yang Marquez since a month ago, missed 2  appts    Labs reviewed     OARRS  I have personally reviewed the OARRS report for Glenroy Velezanaly. I have considered the risks of abuse, dependence, addiction and diversion. Last filled Oxycodone 5 mg on 03/05/2024 (20 tabs x5 days), Ativan 1 mg (on 07/01/2022 (60  tabs x 30 days)    Last Urine Drug Screen  Date of Last Screen: none on file    Controlled Substance Agreement:  Date of the Last Agreement: none on file      -Follow-up with this provider in 3 weeks.    - Follow up with physical health providers as scheduled  -Risks/benefits/assessment of medication interventions discussed with pt; pt agreeable to plan. Will continue to monitor for symptoms mgmt and SEs and adjust plan as needed.  -MI to increase coping skills/behavior regulation.  -Safety plan reviewed.  -Call  Psychiatry at [phone] with issues.  -For [facility] residents, [employer] is a 24/7 hotline you can call for assistance at [phone] [phone]. Please call [phone] or go to your closest Emergency Room if you feel worse. This includes thoughts of hurting yourself or anyone else, or having other troubles such as hearing voices, seeing visions, or having new and scary thoughts about the people around you.

## 2024-05-22 ENCOUNTER — PATIENT OUTREACH (OUTPATIENT)
Dept: CARE COORDINATION | Facility: CLINIC | Age: 39
End: 2024-05-22
Payer: COMMERCIAL

## 2024-05-24 ENCOUNTER — OFFICE VISIT (OUTPATIENT)
Dept: BEHAVIORAL HEALTH | Facility: CLINIC | Age: 39
End: 2024-05-24
Payer: COMMERCIAL

## 2024-05-24 DIAGNOSIS — F34.1 PERSISTENT DEPRESSIVE DISORDER: ICD-10-CM

## 2024-05-24 DIAGNOSIS — F41.1 GAD (GENERALIZED ANXIETY DISORDER): ICD-10-CM

## 2024-05-24 PROCEDURE — 4010F ACE/ARB THERAPY RXD/TAKEN: CPT | Performed by: PSYCHOLOGIST

## 2024-05-24 PROCEDURE — 90837 PSYTX W PT 60 MINUTES: CPT | Performed by: PSYCHOLOGIST

## 2024-05-25 NOTE — PROGRESS NOTES
10 AM 87294 Indiv Psych for Persistent Depressive Dx and KAROL (60 min, [id number])  In-person. Supportive/CBT. Missed last two meetings, didn't have transportation, wife's car out of commission, had to lend his for her to work. Discussed med assessment. Gave patient ADHD screening to complete, discussed ADHD dx. Wife also will complete on. Trazadone increased to 100 mg/night for sleep. So far can't tell. Got notebook for therapy events but forgot. Feeling depressed, tries to get busy when feeling this way. Has to help mom this weekend. Mom is getting a dumpster, father a hoarder. Has to help go through fathers' stuff and trash a lot. Said it will be difficult, brother and mother will likely get sad and he'll feel bad for them. He'll feel bad but will try to stay on task. Considering other jobs (e.g., [employer]). Got out shooting with cousin. Struggling at work, needs to stay ahead after last PIP. Doing yard work. Said anger management improved. Med changed to fluoxetine. Next: 3 weeks.

## 2024-06-06 RX ORDER — SODIUM PICOSULFATE, MAGNESIUM OXIDE, AND ANHYDROUS CITRIC ACID 12; 3.5; 1 G/175ML; G/175ML; MG/175ML
LIQUID ORAL
COMMUNITY
Start: 2024-03-08

## 2024-06-10 ENCOUNTER — OFFICE VISIT (OUTPATIENT)
Dept: BEHAVIORAL HEALTH | Facility: CLINIC | Age: 39
End: 2024-06-10
Payer: COMMERCIAL

## 2024-06-10 VITALS
SYSTOLIC BLOOD PRESSURE: 147 MMHG | DIASTOLIC BLOOD PRESSURE: 93 MMHG | BODY MASS INDEX: 39.17 KG/M2 | HEART RATE: 99 BPM | HEIGHT: 75 IN | WEIGHT: 315 LBS

## 2024-06-10 DIAGNOSIS — F34.1 PERSISTENT DEPRESSIVE DISORDER: ICD-10-CM

## 2024-06-10 DIAGNOSIS — F41.0 PANIC DISORDER: ICD-10-CM

## 2024-06-10 DIAGNOSIS — G47.9 SLEEP DIFFICULTIES: ICD-10-CM

## 2024-06-10 DIAGNOSIS — F41.1 GAD (GENERALIZED ANXIETY DISORDER): ICD-10-CM

## 2024-06-10 DIAGNOSIS — Z13.39 ADHD (ATTENTION DEFICIT HYPERACTIVITY DISORDER) EVALUATION: ICD-10-CM

## 2024-06-10 PROCEDURE — 3080F DIAST BP >= 90 MM HG: CPT

## 2024-06-10 PROCEDURE — 99214 OFFICE O/P EST MOD 30 MIN: CPT

## 2024-06-10 PROCEDURE — 3077F SYST BP >= 140 MM HG: CPT

## 2024-06-10 PROCEDURE — 4010F ACE/ARB THERAPY RXD/TAKEN: CPT

## 2024-06-10 RX ORDER — FLUOXETINE HYDROCHLORIDE 40 MG/1
40 CAPSULE ORAL DAILY
Qty: 30 CAPSULE | Refills: 2 | Status: SHIPPED | OUTPATIENT
Start: 2024-06-10 | End: 2024-09-08

## 2024-06-10 RX ORDER — LORAZEPAM 0.5 MG/1
0.5 TABLET ORAL EVERY 8 HOURS PRN
Qty: 10 TABLET | Refills: 0 | Status: SHIPPED | OUTPATIENT
Start: 2024-06-10 | End: 2024-06-20

## 2024-06-10 RX ORDER — TRAZODONE HYDROCHLORIDE 100 MG/1
100-200 TABLET ORAL NIGHTLY PRN
Qty: 30 TABLET | Refills: 2 | Status: SHIPPED | OUTPATIENT
Start: 2024-06-10 | End: 2024-09-08

## 2024-06-10 NOTE — PROGRESS NOTES
HPI  Glenroy Parker is a 38 y.o. male patient with a CC No chief complaint on file. presenting to outpatient treatment for a scheduled psych outpatient psychiatric evaluation.   Pt identify self by name, , and address     06/10/2024  Reports mild improvement with a anxiety and depression since last visit. Continue to struggle with motivation/energy/sadness/anxiety. Reports having several panic attacks in which Hydroxyzine mildly benefited but caused severe drowsiness. This makes it difficult to travel for work especially driving to [place] and back for several days. States Trazodone puts him to sleep but he's unable to stay asleep. Denies hallucinations/delusion/maki/hypomania/SI. States Dr. Marquez is currently evaluating him for ADHD.     Current S/Sx:  -Mood swings: mood swings, shops recklessly  -Depressive mood: see PHQ-9  -Fatigue/Energy: nearly daily  -Feeling hope/help/worthless: yes  -Sleep: sleeps 3-5yrs per night.   -Motivation: low  -Appetite/Weight Changes: increased appetite, gained about 30 lbs in the past 6 months  -Psychosis: denies hallucinations/delusions  -SI/HI: Denies current suicidal intent/plan  -Guns/Weapons at home: guns in the house, locked up in a safe place     -Worry excessively: present, impactful  -Difficulty controlling worry: present, impactful  -Easily fatigued d/t worry: present, impactful  -Poor concentration d/t worry: present, impactful  -Sleep disturbance d/t worry: present, impactful  -Easy irritability d/t worry: present, impactful  -Restless / feeling on edge d/t worry: present, impactful     Panic attacks  Onset: about a 1/5 yrs ago, duration: 20 - 30 mins, frequency: had 2 so far, associated s/sx: heart racing, shaky, sweating, fidgeting, relieving factors: unsure, time, precipitating factors: large crowds, leaving the house (since ) with the pandemic, last one: last year     HISTORY  PSYCH HISTORY  -Psych Hx: KAROL, MDD  -Psych Hospitalization Hx: denies  -Suicide  "Attempt Hx: tried to hang self as a teenager x1 (\"realized it was stupid.\")  -Self-Harm/Violence Hx: denies  -Current psych meds: Wellbutrin  mg daily, Hydroxyzine 25 mg BID as needed (causes drowsiness)  -Psych Med Hx: Wellbutrin  mg daily, Hydroxyzine 25 mg as needed, Abilify 5 mg daily (didn't feel any different), Lamotrigine 200 mg daily (didn't feel any different), Mirtazapine 7.5 mg at bedtime, Klonopin 1 mg (didn't help), Trazodone 50 mg (ineffective)     SUBSTANCE USE HISTORY  -Substance Use Hx: denies  -ETOH: occasionally  -Tobacco: about 1 ppd since 16 - 17 yrs now  -Caffeine: 2-5 cups coffee/day depending of level of fatigue  -Substance Abuse Treatment Hx: denies     FAMILY HISTORY  -Family Psych Hx: depression  -Family Suicide Hx: denies  -Family Substance Abuse Hx: dad: alcoholic     SOCIAL HISTORY  -Upbringing: Grew up with both parents. 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       PHYSICAL EXAM  Physical Exam  Psychiatric:         Attention and Perception: Attention and perception normal.         Mood and Affect: Mood and affect normal.         Speech: Speech normal.         Behavior: Behavior normal. Behavior is cooperative.         Thought Content: Thought content normal.         Cognition and Memory: Cognition and memory normal.          IMPRESSION  No chief complaint on file.    Notes mild improvement with anxiety and severe depression on current dose of Prozac. Denies mood swings or engagement in impulsive behaviors. No hallucinations/delusion/maki/hypomania/SI " reported.Appetite remains the same, sleeps has minimally improved. No side effects or substance use concerns at this time. Awaiting final ADHD results by Dr. Marquez. Discussed to increase Prozac to further improve anxiety/depression. Discussed to add Ativan to panic attacks. Discussed to increase Trazodone for sleep.      SI/HI ASSESSMENT  -Risk Assessment: Glenroy Parker is currently a medium chronic risk of suicide and self-harm due to past suicide attempt(s) but not currently endorsing thoughts of suicide.   -Suicidal Risk Factors: male, unmarried/single, prior suicide attempt(s), chronic medical illness, access to weapons, and panic attacks  -Protective Factors: strong coping skills, social support/connectedness, moral objections to suicide, hopefulness/future orientation, marriage/partnership, and employment  -Plan to Reduce Risk: increase coping skills .     PLAN  Reviewed diagnostic impression including subjective and objective data and provided education about Panic attacks, MDD, KAROL, bipolar disorder, and ADHD, etiology, treatment recommendations including medication, therapy, course of treatment and prognosis. Patient amenable to treatment plan.      Dx: MDD, KAROL, r/o ADHD, r/o bipolar 2 d/o  INCREASE Prozac 40 mg daily  INCREASE Trazodone 100 mg; take 1 - 2 tabs at bedtime prn for sleep  START Ativan 0.5 mg daily prn for panic attacks     Reviewed r/b/a, possible side effects of the medication. Client is aware about the benefit outweighs the risk.     Psychotherapy: weekly counseling with Dr. Yang Marquez since a month ago, missed 2 appts    Labs reviewed     OARRS  I have personally reviewed the OARRS report for Glenroy Parker. I have considered the risks of abuse, dependence, addiction and diversion. Last filled Oxycodone 5 mg on 03/05/2024 (20 tabs x5 days), Ativan 1 mg (on 07/01/2022 (60  tabs x 30 days)    Last Urine Drug Screen  Date of Last Screen: none on file    Controlled Substance  Agreement:  Date of the Last Agreement: none on file      -Follow-up with this provider in 6 weeks.    - Follow up with physical health providers as scheduled  -Risks/benefits/assessment of medication interventions discussed with pt; pt agreeable to plan. Will continue to monitor for symptoms mgmt and SEs and adjust plan as needed.  -MI to increase coping skills/behavior regulation.  -Safety plan reviewed.  -Call  Psychiatry at [phone] with issues.  -For [facility], [facility] is a 24/7 hotline you can call for assistance at [phone] [phone]. Please call [phone] or go to your closest Emergency Room if you feel worse. This includes thoughts of hurting yourself or anyone else, or having other troubles such as hearing voices, seeing visions, or having new and scary thoughts about the people around you.

## 2024-06-14 ENCOUNTER — APPOINTMENT (OUTPATIENT)
Dept: BEHAVIORAL HEALTH | Facility: CLINIC | Age: 39
End: 2024-06-14
Payer: COMMERCIAL

## 2024-06-14 DIAGNOSIS — F34.1 PERSISTENT DEPRESSIVE DISORDER: ICD-10-CM

## 2024-06-14 DIAGNOSIS — F41.1 GAD (GENERALIZED ANXIETY DISORDER): ICD-10-CM

## 2024-06-14 PROCEDURE — 4010F ACE/ARB THERAPY RXD/TAKEN: CPT | Performed by: PSYCHOLOGIST

## 2024-06-14 PROCEDURE — 90837 PSYTX W PT 60 MINUTES: CPT | Performed by: PSYCHOLOGIST

## 2024-06-14 NOTE — PROGRESS NOTES
9 AM [id number] Indiv Psych for Persistent Depressive Dx and KAROL (60 min, [phone])  In-person. Supportive/CBT. Still reporting depressive symptoms. Taking fluoxetine consistently, now on a month, increased dose one time. Also, taking trazadone for sleep (200 mg/night) and has script for ativan for acute anxiety/panic but hasn't taken yet. Discussed sadness, triggered by being at mom's a lot, helping sort father's clutter/possessions. Throwing out a lot. Also, seeing pictures of him and father, trigger for sadness. Trying to be available to mom, difficulty saying no, frustrated with brother who lives there and doesn't help. Still applying for jobs, recently applied for [employer] job. Gf supportive. Difficulty allowing self to emote. Has projects to do. Next; 2-3 weeks.

## 2024-06-18 ENCOUNTER — TELEMEDICINE (OUTPATIENT)
Dept: BEHAVIORAL HEALTH | Facility: CLINIC | Age: 39
End: 2024-06-18
Payer: COMMERCIAL

## 2024-06-18 DIAGNOSIS — F41.1 GAD (GENERALIZED ANXIETY DISORDER): ICD-10-CM

## 2024-06-18 DIAGNOSIS — F34.1 PERSISTENT DEPRESSIVE DISORDER: ICD-10-CM

## 2024-06-18 DIAGNOSIS — F41.0 PANIC DISORDER: ICD-10-CM

## 2024-06-18 PROCEDURE — 90791 PSYCH DIAGNOSTIC EVALUATION: CPT | Performed by: PSYCHOLOGIST

## 2024-06-18 PROCEDURE — 4010F ACE/ARB THERAPY RXD/TAKEN: CPT | Performed by: PSYCHOLOGIST
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
## 2024-06-24 PROBLEM — F17.200 NICOTINE DEPENDENCE: Status: ACTIVE | Noted: 2024-06-24

## 2024-06-24 PROBLEM — E66.01 SEVERE OBESITY (BMI >= 40) (MULTI): Status: ACTIVE | Noted: 2024-06-24

## 2024-06-24 RX ORDER — MINOCYCLINE HYDROCHLORIDE 100 MG/1
1 CAPSULE ORAL
COMMUNITY
Start: 2023-11-27

## 2024-07-01 DIAGNOSIS — M79.671 RIGHT FOOT PAIN: Primary | ICD-10-CM

## 2024-07-02 ENCOUNTER — APPOINTMENT (OUTPATIENT)
Dept: BEHAVIORAL HEALTH | Facility: CLINIC | Age: 39
End: 2024-07-02
Payer: COMMERCIAL

## 2024-07-02 ENCOUNTER — HOSPITAL ENCOUNTER (OUTPATIENT)
Dept: RADIOLOGY | Facility: CLINIC | Age: 39
Discharge: HOME | End: 2024-07-02
Payer: COMMERCIAL

## 2024-07-02 DIAGNOSIS — M79.671 RIGHT FOOT PAIN: ICD-10-CM

## 2024-07-02 DIAGNOSIS — F41.0 PANIC DISORDER: ICD-10-CM

## 2024-07-02 DIAGNOSIS — F34.1 PERSISTENT DEPRESSIVE DISORDER: ICD-10-CM

## 2024-07-02 DIAGNOSIS — F41.1 GAD (GENERALIZED ANXIETY DISORDER): ICD-10-CM

## 2024-07-02 PROCEDURE — 73630 X-RAY EXAM OF FOOT: CPT | Mod: RIGHT SIDE | Performed by: RADIOLOGY

## 2024-07-02 PROCEDURE — 90837 PSYTX W PT 60 MINUTES: CPT | Performed by: PSYCHOLOGIST

## 2024-07-02 PROCEDURE — 73630 X-RAY EXAM OF FOOT: CPT | Mod: RT

## 2024-07-02 PROCEDURE — 4010F ACE/ARB THERAPY RXD/TAKEN: CPT | Performed by: PSYCHOLOGIST

## 2024-07-02 NOTE — PROGRESS NOTES
11:00am to 11:55am  Met with client today for his individual session via telehealth.  Client was given an ADHD specific assessment interview.  Upon completion of the assessment client's interview was scored and client's assessment came back inconclusive.  Client will be given a B.A.A.R.S. IV to have his mother complete and his fiance. Client will be encouraged to return the forms before his scheduled result session.

## 2024-07-05 ENCOUNTER — APPOINTMENT (OUTPATIENT)
Dept: BEHAVIORAL HEALTH | Facility: CLINIC | Age: 39
End: 2024-07-05
Payer: COMMERCIAL

## 2024-07-05 DIAGNOSIS — F34.1 PERSISTENT DEPRESSIVE DISORDER: ICD-10-CM

## 2024-07-05 DIAGNOSIS — F41.9 ANXIETY: ICD-10-CM

## 2024-07-05 PROCEDURE — 4010F ACE/ARB THERAPY RXD/TAKEN: CPT | Performed by: PSYCHOLOGIST

## 2024-07-05 PROCEDURE — 90837 PSYTX W PT 60 MINUTES: CPT | Performed by: PSYCHOLOGIST

## 2024-07-05 NOTE — PROGRESS NOTES
8AM 95975 Indiv Psych for Persistent Depressive Dx and KAROL (60 min, [phone])  In-person. Supportive/CBT. Still depressive periods, mostly when thinking of loss of father. Tries to protect mom even when she wants to discuss, doesn't want her to see him upset over. Has projects he's doing at home. Mother getting dumpster again next weekend to clean out more of father's stuff/junk. Mom and ELIZABETH came over for 4th of [employer] yesterday. Hasn't heard back from [employer] job background check. Encouraged him to continue looking on [employer]. Discussed difficulty focusing on work tasks. Likely will be on probation, not keeping up with tasks. Gets easily distracted, discussed him trying timer to stay focused. Also, discussed his starting multiple projects and not completing. Will get gf and mom to complete ADHD questionnaires for Dr. Staton. Next: 1 month.

## 2024-07-15 ENCOUNTER — PHARMACY VISIT (OUTPATIENT)
Dept: PHARMACY | Facility: CLINIC | Age: 39
End: 2024-07-15
Payer: MEDICARE

## 2024-07-15 PROCEDURE — RXMED WILLOW AMBULATORY MEDICATION CHARGE

## 2024-07-18 ENCOUNTER — APPOINTMENT (OUTPATIENT)
Dept: BEHAVIORAL HEALTH | Facility: CLINIC | Age: 39
End: 2024-07-18
Payer: COMMERCIAL

## 2024-07-18 DIAGNOSIS — F90.2 ATTENTION DEFICIT HYPERACTIVITY DISORDER, COMBINED TYPE, MODERATE: ICD-10-CM

## 2024-07-18 DIAGNOSIS — F41.0 PANIC DISORDER: ICD-10-CM

## 2024-07-18 DIAGNOSIS — F41.1 GAD (GENERALIZED ANXIETY DISORDER): ICD-10-CM

## 2024-07-18 DIAGNOSIS — F34.1 PERSISTENT DEPRESSIVE DISORDER: ICD-10-CM

## 2024-07-18 PROCEDURE — 4010F ACE/ARB THERAPY RXD/TAKEN: CPT | Performed by: PSYCHOLOGIST

## 2024-07-18 PROCEDURE — 90834 PSYTX W PT 45 MINUTES: CPT | Performed by: PSYCHOLOGIST

## 2024-07-18 NOTE — PROGRESS NOTES
1:00pm to 1:45pm  Met with client today for his individual session via Epic.  Client was given the results of his ADHD testing.  [facility], it was determined that client has ADHD combined type moderate.  Client's new diagnosis has been added to his chart.    Client was also given a psychoeducation around his new diagnosis.  Recommended that client participate in counseling as well as med management in order to manage his symptoms.  Client was given information during the psychoeducation about both.  Client was also emailed (in an encrypted email) a summary letter stating that he was tested, his diagnosis, and what are the recommendations.    Client's case is currently being closed as his assessment has been completed.

## 2024-07-22 ENCOUNTER — APPOINTMENT (OUTPATIENT)
Dept: BEHAVIORAL HEALTH | Facility: CLINIC | Age: 39
End: 2024-07-22
Payer: COMMERCIAL

## 2024-07-22 ENCOUNTER — LAB (OUTPATIENT)
Dept: LAB | Facility: LAB | Age: 39
End: 2024-07-22
Payer: COMMERCIAL

## 2024-07-22 DIAGNOSIS — F90.2 ATTENTION DEFICIT HYPERACTIVITY DISORDER (ADHD), COMBINED TYPE: ICD-10-CM

## 2024-07-22 DIAGNOSIS — F34.1 PERSISTENT DEPRESSIVE DISORDER: ICD-10-CM

## 2024-07-22 DIAGNOSIS — F41.0 PANIC DISORDER: ICD-10-CM

## 2024-07-22 DIAGNOSIS — F41.1 GAD (GENERALIZED ANXIETY DISORDER): ICD-10-CM

## 2024-07-22 PROCEDURE — RXMED WILLOW AMBULATORY MEDICATION CHARGE

## 2024-07-22 PROCEDURE — 4010F ACE/ARB THERAPY RXD/TAKEN: CPT

## 2024-07-22 PROCEDURE — 99214 OFFICE O/P EST MOD 30 MIN: CPT

## 2024-07-22 PROCEDURE — 80307 DRUG TEST PRSMV CHEM ANLYZR: CPT

## 2024-07-22 RX ORDER — FLUOXETINE HYDROCHLORIDE 40 MG/1
40 CAPSULE ORAL DAILY
Qty: 30 CAPSULE | Refills: 2 | Status: SHIPPED | OUTPATIENT
Start: 2024-07-22 | End: 2024-10-20

## 2024-07-22 RX ORDER — LORAZEPAM 1 MG/1
1 TABLET ORAL DAILY PRN
Qty: 15 TABLET | Refills: 0 | Status: SHIPPED | OUTPATIENT
Start: 2024-07-22 | End: 2024-08-21

## 2024-07-22 RX ORDER — ARIPIPRAZOLE 5 MG/1
5 TABLET ORAL DAILY
Qty: 30 TABLET | Refills: 0 | Status: SHIPPED | OUTPATIENT
Start: 2024-07-22 | End: 2024-08-22

## 2024-07-22 NOTE — PROGRESS NOTES
HPI  Glenroy Parker is a 38 y.o. male patient with a CC ADHD, MDD (Major Depressive Disorder), Anxiety, Med Management, Sleeping Problem, and Panic Attack presenting to outpatient treatment for a scheduled psych outpatient psychiatric evaluation.   Pt identify self by name, , and address     2026  Reports ongoing struggle with episodic, overwhelming anxiety requiring to take more than the prescribed dose of Ativan. States he only has 1 tab out of the 10 prescribed left. States current dose of Prozac has not been effective in managing his anxiety but he notes struggling with easy frustration/irritability more than anxiety/depressive related symptoms. Reports sleeping well on current dose of Trazodone. Reports testing +ve for ADHD, combined type, moderate per psychological testing performed by Dr. Staton. Denies hallucinations/delusion/maki/hypomania/SI.    Current S/Sx:  -Mood swings: mood swings, shops recklessly  -Depressive mood: see PHQ-9  -Fatigue/Energy: nearly daily  -Feeling hope/help/worthless: yes  -Sleep: sleeps 3-5yrs per night.   -Motivation: low  -Appetite/Weight Changes: increased appetite, gained about 30 lbs in the past 6 months  -Psychosis: denies hallucinations/delusions  -SI/HI: Denies current suicidal intent/plan  -Guns/Weapons at home: guns in the house, locked up in a safe place     -Worry excessively: present, impactful  -Difficulty controlling worry: present, impactful  -Easily fatigued d/t worry: present, impactful  -Poor concentration d/t worry: present, impactful  -Sleep disturbance d/t worry: present, impactful  -Easy irritability d/t worry: present, impactful  -Restless / feeling on edge d/t worry: present, impactful     Panic attacks  Onset: about a 1/5 yrs ago, duration: 20 - 30 mins, frequency: had 2 so far, associated s/sx: heart racing, shaky, sweating, fidgeting, relieving factors: unsure, time, precipitating factors: large crowds, leaving the house (since ) with  "the pandemic, last one: last year     HISTORY  PSYCH HISTORY  -Psych Hx: KAROL, MDD  -Psych Hospitalization Hx: denies  -Suicide Attempt Hx: tried to hang self as a teenager x1 (\"realized it was stupid.\")  -Self-Harm/Violence Hx: denies  -Current psych meds: Wellbutrin  mg daily, Hydroxyzine 25 mg BID as needed (causes drowsiness)  -Psych Med Hx: Wellbutrin  mg daily, Hydroxyzine 25 mg as needed, Abilify 5 mg daily (didn't feel any different), Lamotrigine 200 mg daily (didn't feel any different), Mirtazapine 7.5 mg at bedtime, Klonopin 1 mg (didn't help), Trazodone 50 mg (ineffective)     SUBSTANCE USE HISTORY  -Substance Use Hx: denies  -ETOH: occasionally  -Tobacco: about 1 ppd since 16 - 17 yrs now  -Caffeine: 2-5 cups coffee/day depending of level of fatigue  -Substance Abuse Treatment Hx: denies     FAMILY HISTORY  -Family Psych Hx: depression  -Family Suicide Hx: denies  -Family Substance Abuse Hx: dad: alcoholic     SOCIAL HISTORY  -Upbringing: Grew up with both parents. 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       PHYSICAL EXAM  Physical Exam  Psychiatric:         Attention and Perception: Attention and perception normal.         Mood and Affect: Mood and affect normal.         Speech: Speech normal.         Behavior: Behavior normal. Behavior is cooperative.         Thought Content: Thought content normal.         Cognition and Memory: Cognition and memory normal.          IMPRESSION  ADHD, MDD (Major Depressive Disorder), Anxiety, Med Management, Sleeping Problem, and Panic Attack    Notes " significant present bouts of anxiety since the last visit as well as moderate depression on current dose of Prozac. Took more than prescribed doses of Ativan, education provided. Denies mood swings or engagement in impulsive behaviors. No hallucinations/delusion/maki/hypomania/SI reported. Appetite and sleep have improved. No side effects or substance use concerns at this time. +ve ADHD, combined type, moderate assessed per psychological testing. Discussed to continue current dose of Prozac but add Abilify to improve depressive symtpoms as well as irritability. Discussed to Start Adderall XR, CSA obtained as well as UDS ordered. Discussed to increase Ativan to further mitigate/manage panic attacks.      SI/HI ASSESSMENT  -Risk Assessment: Glenroy Parker is currently a medium chronic risk of suicide and self-harm due to past suicide attempt(s) but not currently endorsing thoughts of suicide.   -Suicidal Risk Factors: male, unmarried/single, prior suicide attempt(s), chronic medical illness, access to weapons, and panic attacks  -Protective Factors: strong coping skills, social support/connectedness, moral objections to suicide, hopefulness/future orientation, marriage/partnership, and employment  -Plan to Reduce Risk: increase coping skills .     PLAN  Reviewed diagnostic impression including subjective and objective data and provided education about Panic attacks, MDD, KAROL, bipolar disorder, and ADHD, etiology, treatment recommendations including medication, therapy, course of treatment and prognosis. Patient amenable to treatment plan.      Dx: MDD, KAROL, ADHD, r/o bipolar 2 d/o  CONTINUE Prozac 40 mg daily  CONTINUE Trazodone 100 mg; take 1 - 2 tabs at bedtime prn for sleep  INCREASE Ativan 1 mg daily prn for panic attacks (15 tabs issued)  START Adderall XR 10 mg daily   START Abilify 5 mg daily (could take at bedtime if drowsy)     Reviewed r/b/a, possible side effects of the medication. Client is aware about  the benefit outweighs the risk.     Psychotherapy: weekly counseling with Dr. Yang Marquez since a month ago, missed 2 appts    Labs reviewed     OARRS  I have personally reviewed the OARRS report for Glenroy KATARINA Parker. I have considered the risks of abuse, dependence, addiction and diversion. Last filled Oxycodone 5 mg on 03/05/2024 (20 tabs x5 days), Ativan 1 mg 06/10/2024 (10 tabs x 10 days)    Last Urine Drug Screen  Date of Last Screen: ordered    Controlled Substance Agreement:  Date of the Last Agreement: CSA (benzo) 6/10/2024 , CSA (stimulants): 7/22/2024       -Follow-up with this provider in 5 weeks.    - Follow up with physical health providers as scheduled  -Risks/benefits/assessment of medication interventions discussed with pt; pt agreeable to plan. Will continue to monitor for symptoms mgmt and SEs and adjust plan as needed.  -MI to increase coping skills/behavior regulation.  -Safety plan reviewed.  -Call  Psychiatry at [phone] with issues.  -For [facility] residents, [email or web address] is a 24/7 hotline you can call for assistance at [phone]) 258-4159. Please call [phone] or go to your closest Emergency Room if you feel worse. This includes thoughts of hurting yourself or anyone else, or having other troubles such as hearing voices, seeing visions, or having new and scary thoughts about the people around you.

## 2024-07-23 ENCOUNTER — PHARMACY VISIT (OUTPATIENT)
Dept: PHARMACY | Facility: CLINIC | Age: 39
End: 2024-07-23
Payer: MEDICARE

## 2024-07-23 LAB
AMPHETAMINES UR QL SCN: NORMAL
BARBITURATES UR QL SCN: NORMAL
BENZODIAZ UR QL SCN: NORMAL
BZE UR QL SCN: NORMAL
CANNABINOIDS UR QL SCN: NORMAL
FENTANYL+NORFENTANYL UR QL SCN: NORMAL
METHADONE UR QL SCN: NORMAL
OPIATES UR QL SCN: NORMAL
OXYCODONE+OXYMORPHONE UR QL SCN: NORMAL
PCP UR QL SCN: NORMAL

## 2024-07-23 PROCEDURE — RXMED WILLOW AMBULATORY MEDICATION CHARGE

## 2024-07-23 RX ORDER — DEXTROAMPHETAMINE SACCHARATE, AMPHETAMINE ASPARTATE MONOHYDRATE, DEXTROAMPHETAMINE SULFATE AND AMPHETAMINE SULFATE 2.5; 2.5; 2.5; 2.5 MG/1; MG/1; MG/1; MG/1
10 CAPSULE, EXTENDED RELEASE ORAL DAILY
Qty: 40 CAPSULE | Refills: 0 | Status: SHIPPED | OUTPATIENT
Start: 2024-07-23 | End: 2024-09-01

## 2024-07-30 DIAGNOSIS — F41.0 PANIC DISORDER: ICD-10-CM

## 2024-07-30 RX ORDER — CLONAZEPAM 0.5 MG/1
0.5 TABLET ORAL DAILY PRN
Qty: 5 TABLET | Refills: 0 | Status: SHIPPED | OUTPATIENT
Start: 2024-07-30 | End: 2025-07-30

## 2024-07-30 NOTE — PROGRESS NOTES
Notes Ativan is not controlling his panic attacks at all and he seems to be having multiple per week    OARRS:  - Last filled Adderall XR on 07/23/2024 (30 caps x 30 days)  - Last filled Ativan 1 mg on 07/22/2024 (15 tabs x 15 days)    Issuing     Klonopin 0.5 mg daily prn (5 tabs issued)

## 2024-08-09 ENCOUNTER — APPOINTMENT (OUTPATIENT)
Dept: BEHAVIORAL HEALTH | Facility: CLINIC | Age: 39
End: 2024-08-09
Payer: COMMERCIAL

## 2024-08-14 ENCOUNTER — PHARMACY VISIT (OUTPATIENT)
Dept: PHARMACY | Facility: CLINIC | Age: 39
End: 2024-08-14
Payer: COMMERCIAL

## 2024-08-14 PROCEDURE — RXMED WILLOW AMBULATORY MEDICATION CHARGE

## 2024-08-15 DIAGNOSIS — M25.569 CHRONIC KNEE PAIN, UNSPECIFIED LATERALITY: Primary | ICD-10-CM

## 2024-08-15 DIAGNOSIS — G89.29 CHRONIC KNEE PAIN, UNSPECIFIED LATERALITY: Primary | ICD-10-CM

## 2024-08-20 ENCOUNTER — OFFICE VISIT (OUTPATIENT)
Dept: ORTHOPEDIC SURGERY | Facility: CLINIC | Age: 39
End: 2024-08-20
Payer: COMMERCIAL

## 2024-08-20 ENCOUNTER — HOSPITAL ENCOUNTER (OUTPATIENT)
Dept: RADIOLOGY | Facility: CLINIC | Age: 39
Discharge: HOME | End: 2024-08-20
Payer: COMMERCIAL

## 2024-08-20 VITALS — HEIGHT: 75 IN | WEIGHT: 315 LBS | BODY MASS INDEX: 39.17 KG/M2

## 2024-08-20 DIAGNOSIS — M25.569 CHRONIC KNEE PAIN, UNSPECIFIED LATERALITY: ICD-10-CM

## 2024-08-20 DIAGNOSIS — M25.561 ACUTE PAIN OF RIGHT KNEE: ICD-10-CM

## 2024-08-20 DIAGNOSIS — G89.29 CHRONIC KNEE PAIN, UNSPECIFIED LATERALITY: ICD-10-CM

## 2024-08-20 PROCEDURE — 4010F ACE/ARB THERAPY RXD/TAKEN: CPT | Performed by: ORTHOPAEDIC SURGERY

## 2024-08-20 PROCEDURE — 73562 X-RAY EXAM OF KNEE 3: CPT | Mod: RT

## 2024-08-20 PROCEDURE — 99203 OFFICE O/P NEW LOW 30 MIN: CPT | Performed by: ORTHOPAEDIC SURGERY

## 2024-08-20 PROCEDURE — 99213 OFFICE O/P EST LOW 20 MIN: CPT | Performed by: ORTHOPAEDIC SURGERY

## 2024-08-20 PROCEDURE — 3008F BODY MASS INDEX DOCD: CPT | Performed by: ORTHOPAEDIC SURGERY

## 2024-08-20 PROCEDURE — 73562 X-RAY EXAM OF KNEE 3: CPT | Mod: RIGHT SIDE | Performed by: RADIOLOGY

## 2024-08-20 NOTE — PROGRESS NOTES
39-year-old is seen with right knee pain that he has had for 2 years.  He has been having persistent severe sharp shooting pain in the right knee and locking swelling and giving way.  He has a history of a lateral meniscus tear approximately 15 years ago.  Currently his symptoms are on the medial side of the knee.  He has used Tylenol and ibuprofen without relief.  Occasionally needs to walk with a cane.  He has a office type job.  He has difficulty going up and down stairs getting up and down from a chair.  Has difficulty sleeping due to the pain.    Pleasant and in no acute distress. Ambulates with a mildly antalgic gait.  Right knee range of motion is 3-120. There is a mild effusion and no instability. The knee is stable to varus and valgus stress Lachman and posterior drawer. There is medial joint line tenderness Rick's is positive with pain medially.  The Left knee range of motion is 0-130 without effusion or instability. There is no tenderness around the left knee. Bilateral lower extremities are well-perfused the skin is intact and muscle tone is adequate.  There is adequate range of motion of his hips without significant pain.    Multiple x-ray views of the right knee are personally reviewed and there is no acute bony abnormality.    A discussion about his knee pain was done.  His history and exam findings are typical for medial meniscus tear.  He will be sent for an MRI to evaluate for meniscus tear.  He may benefit from knee arthroscopy depending on the MRI result.  Will use Tylenol and ibuprofen and avoid aggravating activities.  Further recommendations after the MRI

## 2024-08-21 DIAGNOSIS — F90.2 ATTENTION DEFICIT HYPERACTIVITY DISORDER (ADHD), COMBINED TYPE: ICD-10-CM

## 2024-08-22 PROBLEM — R19.7 DIARRHEA: Status: ACTIVE | Noted: 2024-08-22

## 2024-08-22 PROBLEM — R41.840 POOR CONCENTRATION: Status: ACTIVE | Noted: 2024-08-22

## 2024-08-22 PROBLEM — I10 HYPERTENSION: Status: ACTIVE | Noted: 2024-08-22

## 2024-08-22 PROBLEM — G47.33 OBSTRUCTIVE SLEEP APNEA SYNDROME: Status: ACTIVE | Noted: 2024-08-22

## 2024-08-22 PROBLEM — R60.0 EDEMA OF LOWER EXTREMITY: Status: ACTIVE | Noted: 2024-08-22

## 2024-08-22 PROBLEM — H52.03 HYPEROPIA OF BOTH EYES: Status: ACTIVE | Noted: 2020-02-07

## 2024-08-22 PROBLEM — J01.90 ACUTE SINUSITIS: Status: ACTIVE | Noted: 2024-08-22

## 2024-08-22 PROBLEM — E11.9 TYPE II DIABETES MELLITUS (MULTI): Status: ACTIVE | Noted: 2024-08-22

## 2024-08-22 PROBLEM — M54.16 LUMBAR RADICULOPATHY: Status: ACTIVE | Noted: 2024-08-22

## 2024-08-22 PROBLEM — I99.8 VASCULAR INSUFFICIENCY: Status: ACTIVE | Noted: 2024-08-22

## 2024-08-22 PROBLEM — Z98.890 HISTORY OF CARDIOVASCULAR SURGERY: Status: ACTIVE | Noted: 2024-08-22

## 2024-08-22 PROBLEM — G44.009 CLUSTER HEADACHES: Status: ACTIVE | Noted: 2024-08-22

## 2024-08-22 PROBLEM — N39.0 URINARY TRACT INFECTION: Status: ACTIVE | Noted: 2024-08-22

## 2024-08-22 PROBLEM — M79.89 SWELLING OF EXTREMITY: Status: ACTIVE | Noted: 2024-08-22

## 2024-08-22 PROBLEM — I83.90 ASYMPTOMATIC VARICOSE VEINS: Status: ACTIVE | Noted: 2024-08-22

## 2024-08-22 PROBLEM — R73.09 INCREASED GLUCOSE LEVEL: Status: ACTIVE | Noted: 2024-08-22

## 2024-08-22 PROBLEM — L03.119 CELLULITIS OF LOWER EXTREMITY: Status: ACTIVE | Noted: 2024-08-22

## 2024-08-22 PROBLEM — S20.219A CONTUSION OF RIB: Status: ACTIVE | Noted: 2024-08-22

## 2024-08-22 PROBLEM — R60.0 EDEMA OF BOTH LOWER EXTREMITIES: Status: ACTIVE | Noted: 2024-08-22

## 2024-08-22 PROBLEM — M51.26 HERNIATION OF LUMBAR INTERVERTEBRAL DISC WITHOUT MYELOPATHY: Status: ACTIVE | Noted: 2024-08-22

## 2024-08-22 PROBLEM — U07.1 DISEASE DUE TO SEVERE ACUTE RESPIRATORY SYNDROME CORONAVIRUS 2 (SARS-COV-2): Status: ACTIVE | Noted: 2024-08-22

## 2024-08-22 PROBLEM — E11.9 TYPE 2 DIABETES MELLITUS WITHOUT RETINOPATHY (MULTI): Status: ACTIVE | Noted: 2020-02-07

## 2024-08-22 RX ORDER — DEXTROAMPHETAMINE SACCHARATE, AMPHETAMINE ASPARTATE MONOHYDRATE, DEXTROAMPHETAMINE SULFATE AND AMPHETAMINE SULFATE 2.5; 2.5; 2.5; 2.5 MG/1; MG/1; MG/1; MG/1
10 CAPSULE, EXTENDED RELEASE ORAL DAILY
Qty: 30 CAPSULE | Refills: 0 | Status: SHIPPED | OUTPATIENT
Start: 2024-08-22 | End: 2024-09-21

## 2024-08-23 DIAGNOSIS — M51.26 HERNIATION OF LUMBAR INTERVERTEBRAL DISC WITHOUT MYELOPATHY: ICD-10-CM

## 2024-08-23 DIAGNOSIS — M54.16 LUMBAR RADICULOPATHY: ICD-10-CM

## 2024-08-26 ENCOUNTER — APPOINTMENT (OUTPATIENT)
Dept: BEHAVIORAL HEALTH | Facility: CLINIC | Age: 39
End: 2024-08-26
Payer: COMMERCIAL

## 2024-08-26 ENCOUNTER — TELEPHONE (OUTPATIENT)
Dept: OTHER | Age: 39
End: 2024-08-26

## 2024-08-26 VITALS
HEART RATE: 98 BPM | TEMPERATURE: 98 F | SYSTOLIC BLOOD PRESSURE: 143 MMHG | WEIGHT: 315 LBS | DIASTOLIC BLOOD PRESSURE: 89 MMHG | BODY MASS INDEX: 39.17 KG/M2 | HEIGHT: 75 IN

## 2024-08-26 DIAGNOSIS — F34.1 PERSISTENT DEPRESSIVE DISORDER: ICD-10-CM

## 2024-08-26 DIAGNOSIS — F41.0 PANIC DISORDER: ICD-10-CM

## 2024-08-26 DIAGNOSIS — F90.2 ATTENTION DEFICIT HYPERACTIVITY DISORDER, COMBINED TYPE, MODERATE: ICD-10-CM

## 2024-08-26 DIAGNOSIS — F41.1 GAD (GENERALIZED ANXIETY DISORDER): ICD-10-CM

## 2024-08-26 DIAGNOSIS — Z13.39 ADHD (ATTENTION DEFICIT HYPERACTIVITY DISORDER) EVALUATION: ICD-10-CM

## 2024-08-26 PROCEDURE — RXMED WILLOW AMBULATORY MEDICATION CHARGE

## 2024-08-26 PROCEDURE — 3079F DIAST BP 80-89 MM HG: CPT

## 2024-08-26 PROCEDURE — 3077F SYST BP >= 140 MM HG: CPT

## 2024-08-26 PROCEDURE — 3008F BODY MASS INDEX DOCD: CPT

## 2024-08-26 PROCEDURE — 4010F ACE/ARB THERAPY RXD/TAKEN: CPT

## 2024-08-26 PROCEDURE — 99215 OFFICE O/P EST HI 40 MIN: CPT

## 2024-08-26 RX ORDER — ARIPIPRAZOLE 5 MG/1
5 TABLET ORAL DAILY
Qty: 30 TABLET | Refills: 2 | Status: SHIPPED | OUTPATIENT
Start: 2024-08-26 | End: 2024-11-24

## 2024-08-26 RX ORDER — CLONAZEPAM 0.5 MG/1
0.5 TABLET ORAL DAILY PRN
Qty: 15 TABLET | Refills: 0 | Status: SHIPPED | OUTPATIENT
Start: 2024-08-26 | End: 2024-09-25

## 2024-08-26 RX ORDER — BUSPIRONE HYDROCHLORIDE 15 MG/1
15 TABLET ORAL 2 TIMES DAILY
Qty: 60 TABLET | Refills: 2 | Status: SHIPPED | OUTPATIENT
Start: 2024-08-26 | End: 2024-11-24

## 2024-08-26 RX ORDER — DEXTROAMPHETAMINE SACCHARATE, AMPHETAMINE ASPARTATE MONOHYDRATE, DEXTROAMPHETAMINE SULFATE AND AMPHETAMINE SULFATE 3.75; 3.75; 3.75; 3.75 MG/1; MG/1; MG/1; MG/1
15 CAPSULE, EXTENDED RELEASE ORAL DAILY
Qty: 30 CAPSULE | Refills: 0 | Status: SHIPPED | OUTPATIENT
Start: 2024-08-26 | End: 2024-09-26

## 2024-08-26 NOTE — PROGRESS NOTES
HPI  Glenroy Parker is a 39 y.o. male patient with a CC KAROL, MDD (Major Depressive Disorder), Panic Attack, ADHD, and Sleeping Problem presenting to outpatient treatment for a scheduled psych outpatient psychiatric evaluation.   Pt identify self by name, , and address     2024  Reports slight improvement with anxiety with the addition of Klonopin, but underlying Prozac was not helping as much.  Ativan also did not help with elevated anxiety and panic attacks, reason for switching to Klonopin.  Found Adderall extended release 10 mg slightly helpful in controlling memory, attention, focus, and concentration but notes dose is not optimal.  Found to improvement in frustration/irritability with the addition of Abilify.  Sleep and appetite remain unaffected.  Denies any substance use except he continues to smoke up to 1 pack of cigarettes a day.  Denies SI/HI.  Denies hallucination, delusion, maki, hypomania.  Overall, reports some improvement, but not stable.  Would like this provider to complete open FMLA for work accommodation.    Current S/Sx:  -Mood swings: mood swings, shops recklessly  -Depressive mood: see PHQ-9  -Fatigue/Energy: nearly daily  -Feeling hope/help/worthless: yes  -Sleep: sleeps 3-5yrs per night.   -Motivation: low  -Appetite/Weight Changes: increased appetite, gained about 30 lbs in the past 6 months  -Psychosis: denies hallucinations/delusions  -SI/HI: Denies current suicidal intent/plan  -Guns/Weapons at home: guns in the house, locked up in a safe place     -Worry excessively: present, impactful  -Difficulty controlling worry: present, impactful  -Easily fatigued d/t worry: present, impactful  -Poor concentration d/t worry: present, impactful  -Sleep disturbance d/t worry: present, impactful  -Easy irritability d/t worry: present, impactful  -Restless / feeling on edge d/t worry: present, impactful     Panic attacks  Onset: about a 1/5 yrs ago, duration: 20 - 30 mins, frequency: had 2  "so far, associated s/sx: heart racing, shaky, sweating, fidgeting, relieving factors: unsure, time, precipitating factors: large crowds, leaving the house (since 2021) with the pandemic, last one: last year     HISTORY  PSYCH HISTORY  -Psych Hx: KAROL, MDD  -Psych Hospitalization Hx: denies  -Suicide Attempt Hx: tried to hang self as a teenager x1 (\"realized it was stupid.\")  -Self-Harm/Violence Hx: denies  -Current psych meds: Wellbutrin  mg daily, Hydroxyzine 25 mg BID as needed (causes drowsiness)  -Psych Med Hx: Wellbutrin  mg daily, Hydroxyzine 25 mg as needed, Abilify 5 mg daily (didn't feel any different), Lamotrigine 200 mg daily (didn't feel any different), Mirtazapine 7.5 mg at bedtime, Klonopin 1 mg (didn't help), Trazodone 50 mg (ineffective)     SUBSTANCE USE HISTORY  -Substance Use Hx: denies  -ETOH: occasionally  -Tobacco: about 1 ppd since 16 - 17 yrs now  -Caffeine: 2-5 cups coffee/day depending of level of fatigue  -Substance Abuse Treatment Hx: denies     FAMILY HISTORY  -Family Psych Hx: depression  -Family Suicide Hx: denies  -Family Substance Abuse Hx: dad: alcoholic     SOCIAL HISTORY  -Upbringing: Grew up with both parents. 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       PHYSICAL EXAM  Physical Exam  Psychiatric:         Attention and Perception: Attention and perception normal.         Mood and Affect: Mood and affect normal.         Speech: Speech normal.         Behavior: Behavior normal. Behavior is cooperative.         Thought Content: Thought content normal.         " Cognition and Memory: Cognition and memory normal.          IMPRESSION  KAROL, MDD (Major Depressive Disorder), Panic Attack, ADHD, and Sleeping Problem    Notes some improvement in bouts of anxiety on current dose of Prozac but found switching from Ativan to Klonopin helpful for elevated anxiety and panic attacks, this provider notify patient that the dose will be increased at this time, where at buspirone to control underlying anxiety. Denies mood swings, irritability has improved with current dose of Abilify.. No hallucinations/delusion/maki/hypomania/SI reported. Appetite and sleep have improved. No side effects or substance use concerns at this time.  Discussed to increase the dose of Adderall to improve attention deficit symptoms.  Patient requesting for this provider to complete work accommodation to recover from his current mental health symptoms.  Per his providers assessment, patient does not quite meet criteria for an extensive duration of time of 4, but is willing to recommend patient for mood IOP with work accommodations/FMLA may be provided to cover the duration of therapy in addition to continuing to see this provider for ongoing med management.     SI/HI ASSESSMENT  -Risk Assessment: Glenroy Parker is currently a medium chronic risk of suicide and self-harm due to past suicide attempt(s) but not currently endorsing thoughts of suicide.   -Suicidal Risk Factors: male, unmarried/single, prior suicide attempt(s), chronic medical illness, access to weapons, and panic attacks  -Protective Factors: strong coping skills, social support/connectedness, moral objections to suicide, hopefulness/future orientation, marriage/partnership, and employment  -Plan to Reduce Risk: increase coping skills .     PLAN  Reviewed diagnostic impression including subjective and objective data and provided education about Panic attacks, MDD, KAROL, bipolar disorder, and ADHD, etiology, treatment recommendations including  medication, therapy, course of treatment and prognosis. Patient amenable to treatment plan.      Dx: MDD, KAROL, ADHD, r/o bipolar 2 d/o  CONTINUE Prozac 40 mg daily  CONTINUE Trazodone 100 mg; take 1 - 2 tabs at bedtime prn for sleep  CONTINUE Klonopin 0.5 mg daily prn for panic attacks (15 tabs issued)  INCREASE Adderall XR 15 mg daily   CONTINUE Abilify 5 mg daily      Reviewed r/b/a, possible side effects of the medication. Client is aware about the benefit outweighs the risk.     Psychotherapy: weekly counseling with Dr. Yang Marquez since a month ago, missed 2 appts    Labs reviewed     OARRS  I have personally reviewed the OARRS report for Glenroy Parker. I have considered the risks of abuse, dependence, addiction and diversion. Last filled Adderall ER 10 mg on 08/22/2024 (30 caps x 30 days), Klonopin 0.5 mg on 07/30/2024 (5 tabs x 5 days), Oxycodone 5 mg on 03/05/2024 (20 tabs x5 days), Ativan 1 mg 06/10/2024 (10 tabs x 10 days)    Last Urine Drug Screen  Date of Last Screen: ordered    Controlled Substance Agreement:  Date of the Last Agreement: CSA (benzo) 6/10/2024 , CSA (stimulants): 7/22/2024       -Follow-up with this provider in 4 weeks.    - Follow up with physical health providers as scheduled  -Risks/benefits/assessment of medication interventions discussed with pt; pt agreeable to plan. Will continue to monitor for symptoms mgmt and SEs and adjust plan as needed.  -MI to increase coping skills/behavior regulation.  -Safety plan reviewed.  -Call  Psychiatry at [phone] with issues.  -For [facility] residents, [employer] is a 24/7 hotline you can call for assistance at [phone]. Please call 911 or go to your closest Emergency Room if you feel worse. This includes thoughts of hurting yourself or anyone else, or having other troubles such as hearing voices, seeing visions, or having new and scary thoughts about the people around you.

## 2024-08-27 ENCOUNTER — PHARMACY VISIT (OUTPATIENT)
Dept: PHARMACY | Facility: CLINIC | Age: 39
End: 2024-08-27
Payer: MEDICARE

## 2024-08-30 ENCOUNTER — APPOINTMENT (OUTPATIENT)
Dept: BEHAVIORAL HEALTH | Facility: CLINIC | Age: 39
End: 2024-08-30
Payer: COMMERCIAL

## 2024-09-04 ENCOUNTER — APPOINTMENT (OUTPATIENT)
Dept: BEHAVIORAL HEALTH | Facility: CLINIC | Age: 39
End: 2024-09-04
Payer: COMMERCIAL

## 2024-09-05 ENCOUNTER — HOSPITAL ENCOUNTER (OUTPATIENT)
Dept: RADIOLOGY | Facility: CLINIC | Age: 39
Discharge: HOME | End: 2024-09-05
Payer: COMMERCIAL

## 2024-09-05 DIAGNOSIS — M25.561 ACUTE PAIN OF RIGHT KNEE: ICD-10-CM

## 2024-09-05 PROCEDURE — 73721 MRI JNT OF LWR EXTRE W/O DYE: CPT | Mod: RT

## 2024-09-06 ENCOUNTER — LAB (OUTPATIENT)
Dept: LAB | Facility: LAB | Age: 39
End: 2024-09-06
Payer: COMMERCIAL

## 2024-09-06 DIAGNOSIS — E11.65 TYPE 2 DIABETES MELLITUS WITH HYPERGLYCEMIA, WITH LONG-TERM CURRENT USE OF INSULIN (MULTI): ICD-10-CM

## 2024-09-06 DIAGNOSIS — Z79.4 TYPE 2 DIABETES MELLITUS WITH HYPERGLYCEMIA, WITH LONG-TERM CURRENT USE OF INSULIN (MULTI): ICD-10-CM

## 2024-09-06 LAB
ALBUMIN SERPL BCP-MCNC: 4.7 G/DL (ref 3.4–5)
ALP SERPL-CCNC: 77 U/L (ref 33–120)
ALT SERPL W P-5'-P-CCNC: 42 U/L (ref 10–52)
ANION GAP SERPL CALC-SCNC: 17 MMOL/L (ref 10–20)
AST SERPL W P-5'-P-CCNC: 21 U/L (ref 9–39)
BILIRUB SERPL-MCNC: 0.6 MG/DL (ref 0–1.2)
BUN SERPL-MCNC: 24 MG/DL (ref 6–23)
CALCIUM SERPL-MCNC: 10.1 MG/DL (ref 8.6–10.6)
CHLORIDE SERPL-SCNC: 97 MMOL/L (ref 98–107)
CHOLEST SERPL-MCNC: 246 MG/DL (ref 0–199)
CHOLESTEROL/HDL RATIO: 5.5
CO2 SERPL-SCNC: 28 MMOL/L (ref 21–32)
CREAT SERPL-MCNC: 0.71 MG/DL (ref 0.5–1.3)
CREAT UR-MCNC: 78.9 MG/DL (ref 20–370)
EGFRCR SERPLBLD CKD-EPI 2021: >90 ML/MIN/1.73M*2
EST. AVERAGE GLUCOSE BLD GHB EST-MCNC: 237 MG/DL
GLUCOSE SERPL-MCNC: 191 MG/DL (ref 74–99)
HBA1C MFR BLD: 9.9 %
HDLC SERPL-MCNC: 44.5 MG/DL
LDLC SERPL CALC-MCNC: 150 MG/DL
MICROALBUMIN UR-MCNC: 19.1 MG/L
MICROALBUMIN/CREAT UR: 24.2 UG/MG CREAT
NON HDL CHOLESTEROL: 202 MG/DL (ref 0–149)
POTASSIUM SERPL-SCNC: 4.5 MMOL/L (ref 3.5–5.3)
PROT SERPL-MCNC: 8 G/DL (ref 6.4–8.2)
SODIUM SERPL-SCNC: 137 MMOL/L (ref 136–145)
TRIGL SERPL-MCNC: 260 MG/DL (ref 0–149)
VLDL: 52 MG/DL (ref 0–40)

## 2024-09-06 PROCEDURE — 82570 ASSAY OF URINE CREATININE: CPT

## 2024-09-06 PROCEDURE — 83036 HEMOGLOBIN GLYCOSYLATED A1C: CPT

## 2024-09-06 PROCEDURE — 36415 COLL VENOUS BLD VENIPUNCTURE: CPT

## 2024-09-06 PROCEDURE — 82043 UR ALBUMIN QUANTITATIVE: CPT

## 2024-09-06 PROCEDURE — 80053 COMPREHEN METABOLIC PANEL: CPT

## 2024-09-06 PROCEDURE — 80061 LIPID PANEL: CPT

## 2024-09-10 ENCOUNTER — PHARMACY VISIT (OUTPATIENT)
Dept: PHARMACY | Facility: CLINIC | Age: 39
End: 2024-09-10
Payer: MEDICARE

## 2024-09-10 ENCOUNTER — APPOINTMENT (OUTPATIENT)
Dept: ENDOCRINOLOGY | Facility: CLINIC | Age: 39
End: 2024-09-10
Payer: COMMERCIAL

## 2024-09-10 VITALS
SYSTOLIC BLOOD PRESSURE: 139 MMHG | DIASTOLIC BLOOD PRESSURE: 86 MMHG | BODY MASS INDEX: 39.17 KG/M2 | WEIGHT: 315 LBS | TEMPERATURE: 97.7 F | HEART RATE: 86 BPM | HEIGHT: 75 IN

## 2024-09-10 DIAGNOSIS — E11.65 TYPE 2 DIABETES MELLITUS WITH HYPERGLYCEMIA, WITH LONG-TERM CURRENT USE OF INSULIN (MULTI): ICD-10-CM

## 2024-09-10 DIAGNOSIS — E11.65 TYPE 2 DIABETES MELLITUS WITH HYPERGLYCEMIA, WITH LONG-TERM CURRENT USE OF INSULIN (MULTI): Primary | ICD-10-CM

## 2024-09-10 DIAGNOSIS — E78.5 HYPERLIPIDEMIA, UNSPECIFIED HYPERLIPIDEMIA TYPE: ICD-10-CM

## 2024-09-10 DIAGNOSIS — Z79.4 TYPE 2 DIABETES MELLITUS WITH HYPERGLYCEMIA, WITH LONG-TERM CURRENT USE OF INSULIN (MULTI): ICD-10-CM

## 2024-09-10 DIAGNOSIS — Z79.4 TYPE 2 DIABETES MELLITUS WITH HYPERGLYCEMIA, WITH LONG-TERM CURRENT USE OF INSULIN (MULTI): Primary | ICD-10-CM

## 2024-09-10 PROCEDURE — 3046F HEMOGLOBIN A1C LEVEL >9.0%: CPT | Performed by: STUDENT IN AN ORGANIZED HEALTH CARE EDUCATION/TRAINING PROGRAM

## 2024-09-10 PROCEDURE — 3075F SYST BP GE 130 - 139MM HG: CPT | Performed by: STUDENT IN AN ORGANIZED HEALTH CARE EDUCATION/TRAINING PROGRAM

## 2024-09-10 PROCEDURE — 3050F LDL-C >= 130 MG/DL: CPT | Performed by: STUDENT IN AN ORGANIZED HEALTH CARE EDUCATION/TRAINING PROGRAM

## 2024-09-10 PROCEDURE — 3008F BODY MASS INDEX DOCD: CPT | Performed by: STUDENT IN AN ORGANIZED HEALTH CARE EDUCATION/TRAINING PROGRAM

## 2024-09-10 PROCEDURE — 3061F NEG MICROALBUMINURIA REV: CPT | Performed by: STUDENT IN AN ORGANIZED HEALTH CARE EDUCATION/TRAINING PROGRAM

## 2024-09-10 PROCEDURE — 99214 OFFICE O/P EST MOD 30 MIN: CPT | Performed by: STUDENT IN AN ORGANIZED HEALTH CARE EDUCATION/TRAINING PROGRAM

## 2024-09-10 PROCEDURE — 4010F ACE/ARB THERAPY RXD/TAKEN: CPT | Performed by: STUDENT IN AN ORGANIZED HEALTH CARE EDUCATION/TRAINING PROGRAM

## 2024-09-10 PROCEDURE — 3079F DIAST BP 80-89 MM HG: CPT | Performed by: STUDENT IN AN ORGANIZED HEALTH CARE EDUCATION/TRAINING PROGRAM

## 2024-09-10 PROCEDURE — G2211 COMPLEX E/M VISIT ADD ON: HCPCS | Performed by: STUDENT IN AN ORGANIZED HEALTH CARE EDUCATION/TRAINING PROGRAM

## 2024-09-10 PROCEDURE — RXMED WILLOW AMBULATORY MEDICATION CHARGE

## 2024-09-10 RX ORDER — METFORMIN HYDROCHLORIDE 500 MG/1
1000 TABLET, EXTENDED RELEASE ORAL
Qty: 360 TABLET | Refills: 3 | Status: SHIPPED | OUTPATIENT
Start: 2024-09-10 | End: 2025-09-10

## 2024-09-10 RX ORDER — EMPAGLIFLOZIN 10 MG/1
10 TABLET, FILM COATED ORAL DAILY
Qty: 90 TABLET | Refills: 2 | Status: SHIPPED | OUTPATIENT
Start: 2024-09-10

## 2024-09-10 RX ORDER — INSULIN HUMAN 100 [IU]/ML
INJECTION, SUSPENSION SUBCUTANEOUS
Qty: 15 ML | Refills: 6 | Status: SHIPPED | OUTPATIENT
Start: 2024-09-10

## 2024-09-10 RX ORDER — SEMAGLUTIDE 1.34 MG/ML
1 INJECTION, SOLUTION SUBCUTANEOUS
Qty: 3 ML | Refills: 6 | Status: SHIPPED | OUTPATIENT
Start: 2024-09-10

## 2024-09-10 RX ORDER — BLOOD-GLUCOSE SENSOR
EACH MISCELLANEOUS
Qty: 3 EACH | Refills: 11 | Status: SHIPPED | OUTPATIENT
Start: 2024-09-10

## 2024-09-10 NOTE — PATIENT INSTRUCTIONS
Metformin 1000mg twice a day   Jardiance 10mg daily   Restart insulin 70/30 15 units with breakfast and dinner   Ozempic 0.5mg weekly for one month then increase to 1mg weekly     Labs December    Follow up next available    Funmilayo Hines MD  Divison of Endocrinology   [facility]   Phone: [phone]    option 4, then option 1  Fax: [phone]

## 2024-09-10 NOTE — PROGRESS NOTES
36-year old male PMH: KELLY, gallstone pancreatitis, HTN, Obesity, depressionwith diabetes coming in today for DM follow up     Interval: torn meniscus, for surgery     Duration of type 2 diabetes: dx 2018   Complicated by neuropathy, albuminuria     Current Regimen        MTF 1g daily   jardiance 10mg daily   Ozempic 1-but self discontinued    Previously:   Glipizide  Novolog 70/30     History of pancreatitis attributed to etoh/gallstone, seeing GI, no gallstone on work up    SMBG- has dexcom G7 but having issues with sensor currently not checking BGs     Diet-non compliant   sedentary lifestyle      Screening and Comorbidities   Last eye exam per patient done in 2023, stable DR  foot exam needs  Obesity   HLD- no statin lipid profile improved from weight loss   on Losartan      PMH: as above   Fmxh: non contributory  Social: works in a bank, rare etoh     Weight gain  12 Point ROS reviewed and is negative, pertinent findings noted on HPI  Physical Exam  Constitutional:       Appearance: Normal appearance.   Cardiovascular:      Rate and Rhythm: Normal rate.   Abdominal:      Palpations: Abdomen is soft.      Comments: Abdominal obesity   Musculoskeletal:         General: Normal range of motion.      Cervical back: Normal range of motion and neck supple.      Comments: Venous stasis dermatitis on lower extremity, walks with a cane   Skin:     General: Skin is warm.      Comments: No lipodystrophy   Neurological:      General: No focal deficit present.      Mental Status: He is alert and oriented to person, place, and time.         labs and imaging reviewed, pertinent findings listed on HPI and Impression    Problem List Items Addressed This Visit       Diabetes mellitus, type 2 (Multi)    Relevant Medications    metFORMIN  mg 24 hr tablet    Jardiance 10 mg    insulin NPH and regular human (HumuLIN 70/30 U-100 KwikPen) 100 unit/mL (70-30) injection    Dexcom G7 Sensor device    semaglutide (Ozempic) 1 mg/dose  (2 mg/1.5 mL) pen injector    Other Relevant Orders    Hemoglobin A1C    Renal Function Panel       DM2, with neuropathy,DR and albuminuria  HLD      inconsistent with diabetes control due to the recent death in the family.  Was not using his ozempic.  He also regained some weight.  Most recent A1c and lipid profile uncontrolled due to mental health issues    Discussed BG control prior to getting surgery    Restart ozempic to titrate to 1mg weekly  Restart insulin 70/30 15 units BID, for compliance  Continue jardiance   Increase metformin 1000mg BID   Has a hx of pancreatitis attributed to etoh and gallstone, GI ok with GLP1 to improve metabolic profile     Dexcom G7 rxed    He is not on a statin, he was able to get to LDL and TG goal with lifestyle changes previously so can hold off on this for now    Continue jardiance and metformin     Repeat A1c in December    Follow up next available

## 2024-09-11 RX ORDER — PEN NEEDLE, DIABETIC 30 GX3/16"
NEEDLE, DISPOSABLE MISCELLANEOUS
Qty: 200 EACH | Refills: 3 | Status: SHIPPED | OUTPATIENT
Start: 2024-09-11

## 2024-09-12 ENCOUNTER — TELEPHONE (OUTPATIENT)
Dept: ORTHOPEDIC SURGERY | Facility: CLINIC | Age: 39
End: 2024-09-12
Payer: COMMERCIAL

## 2024-09-12 DIAGNOSIS — M25.561 ACUTE PAIN OF RIGHT KNEE: Primary | ICD-10-CM

## 2024-09-12 PROCEDURE — RXMED WILLOW AMBULATORY MEDICATION CHARGE

## 2024-09-12 RX ORDER — MELOXICAM 15 MG/1
15 TABLET ORAL DAILY PRN
Qty: 30 TABLET | Refills: 0 | Status: SHIPPED | OUTPATIENT
Start: 2024-09-12 | End: 2024-10-12

## 2024-09-12 NOTE — TELEPHONE ENCOUNTER
Spoke to patient in regards to right MRI results.  He is aware that there is a medial meniscus tear.    His most recent hemoglobin A1c taken on September 6 was 9.9.    Explained to patient that he would not be able to have surgical intervention until hemoglobin A1c was more controlled, with a goal of 7 or below.    Patient states that he is already met with his endocrinologist, and they discussed blood glucose control prior to getting surgery.    He was restarted on Ozempic and insulin 70/30   15 units twice daily.  He will continue with Jardiance.  Metformin was increased to 1000 mg twice daily.    Patient was also complaining of increased pain.  States that he has been taking ibuprofen 800 mg 3 times a day as well as Tylenol.  I have prescribed him meloxicam to trial for pain.    He is aware to follow-up with Dr. Rivas once A1c is 7 and below.

## 2024-09-13 ENCOUNTER — PHARMACY VISIT (OUTPATIENT)
Dept: PHARMACY | Facility: CLINIC | Age: 39
End: 2024-09-13
Payer: COMMERCIAL

## 2024-09-13 DIAGNOSIS — R11.0 NAUSEA: Primary | ICD-10-CM

## 2024-09-13 PROCEDURE — RXMED WILLOW AMBULATORY MEDICATION CHARGE

## 2024-09-13 RX ORDER — ONDANSETRON 4 MG/1
TABLET, FILM COATED ORAL
Qty: 30 TABLET | Refills: 0 | Status: SHIPPED | OUTPATIENT
Start: 2024-09-13

## 2024-09-14 ENCOUNTER — PHARMACY VISIT (OUTPATIENT)
Dept: PHARMACY | Facility: CLINIC | Age: 39
End: 2024-09-14
Payer: MEDICARE

## 2024-09-20 ENCOUNTER — APPOINTMENT (OUTPATIENT)
Dept: BEHAVIORAL HEALTH | Facility: CLINIC | Age: 39
End: 2024-09-20
Payer: COMMERCIAL

## 2024-09-20 DIAGNOSIS — F34.1 PERSISTENT DEPRESSIVE DISORDER: ICD-10-CM

## 2024-09-20 DIAGNOSIS — F41.1 GAD (GENERALIZED ANXIETY DISORDER): ICD-10-CM

## 2024-09-20 PROCEDURE — 3046F HEMOGLOBIN A1C LEVEL >9.0%: CPT | Performed by: PSYCHOLOGIST

## 2024-09-20 PROCEDURE — 4010F ACE/ARB THERAPY RXD/TAKEN: CPT | Performed by: PSYCHOLOGIST

## 2024-09-20 PROCEDURE — 3061F NEG MICROALBUMINURIA REV: CPT | Performed by: PSYCHOLOGIST

## 2024-09-20 PROCEDURE — 3050F LDL-C >= 130 MG/DL: CPT | Performed by: PSYCHOLOGIST

## 2024-09-20 PROCEDURE — 90837 PSYTX W PT 60 MINUTES: CPT | Performed by: PSYCHOLOGIST

## 2024-09-20 NOTE — PROGRESS NOTES
12 PM [address] Indiv Psych for Persistent Depressive Dx and KAROL (60 min, 1207-100)  In-person. Supportive/CBT. Patient not seen in the past few months.  He reported that overall his depressive symptoms have improved.  Diagnosed with ADHD a few months ago and has been prescribed Adderall.  Reports some improvement but would like to see more improvement.  Will discuss with his pharmacologist.  Continues to take his other medications including Prozac, Abilify, and BuSpar.  He reported that his anxiety has increased.  Indicated that he has been thinking a lot about his father.  Father's birthday was November 1 and is also thinking about together time during the holidays.  He tore his meniscus and will have surgery at some point.  She is surgeon on October 8.  His A1c levels have been too high.  Has started back on insulin.  Continues to smoke but hopes to make some improvement with that.  Plans on going to see an acupuncture person helped him before with this.  Discussed the importance of having a very specific plan of attack regarding his smoking.  Agreed to complete [place] paperwork for him intermittently because of his symptoms.  Is planning on holding steady with his current job because of his meniscus.  Plans on having a ceremony of marriage with his girlfriend on Halloween.  Wants to be more focused at work.  We discussed the use of alarms to potentially aid with this.  He continues to struggle with his mother asking for help and his girlfriend has encouraged him to say no more. Next: 1 month.

## 2024-09-23 ENCOUNTER — APPOINTMENT (OUTPATIENT)
Dept: BEHAVIORAL HEALTH | Facility: CLINIC | Age: 39
End: 2024-09-23
Payer: COMMERCIAL

## 2024-09-23 VITALS
HEIGHT: 75 IN | TEMPERATURE: 97.8 F | DIASTOLIC BLOOD PRESSURE: 98 MMHG | BODY MASS INDEX: 39.17 KG/M2 | WEIGHT: 315 LBS | RESPIRATION RATE: 18 BRPM | HEART RATE: 91 BPM | SYSTOLIC BLOOD PRESSURE: 159 MMHG

## 2024-09-23 DIAGNOSIS — F41.1 GAD (GENERALIZED ANXIETY DISORDER): ICD-10-CM

## 2024-09-23 DIAGNOSIS — E11.65 TYPE 2 DIABETES MELLITUS WITH HYPERGLYCEMIA, WITH LONG-TERM CURRENT USE OF INSULIN: Primary | ICD-10-CM

## 2024-09-23 DIAGNOSIS — G47.9 SLEEP DIFFICULTIES: ICD-10-CM

## 2024-09-23 DIAGNOSIS — F34.1 PERSISTENT DEPRESSIVE DISORDER: ICD-10-CM

## 2024-09-23 DIAGNOSIS — Z13.39 ADHD (ATTENTION DEFICIT HYPERACTIVITY DISORDER) EVALUATION: ICD-10-CM

## 2024-09-23 DIAGNOSIS — F41.0 PANIC DISORDER: ICD-10-CM

## 2024-09-23 DIAGNOSIS — Z79.4 TYPE 2 DIABETES MELLITUS WITH HYPERGLYCEMIA, WITH LONG-TERM CURRENT USE OF INSULIN: Primary | ICD-10-CM

## 2024-09-23 PROCEDURE — 3008F BODY MASS INDEX DOCD: CPT

## 2024-09-23 PROCEDURE — 3077F SYST BP >= 140 MM HG: CPT

## 2024-09-23 PROCEDURE — 3080F DIAST BP >= 90 MM HG: CPT

## 2024-09-23 PROCEDURE — 3061F NEG MICROALBUMINURIA REV: CPT

## 2024-09-23 PROCEDURE — 3046F HEMOGLOBIN A1C LEVEL >9.0%: CPT

## 2024-09-23 PROCEDURE — 4010F ACE/ARB THERAPY RXD/TAKEN: CPT

## 2024-09-23 PROCEDURE — 3050F LDL-C >= 130 MG/DL: CPT

## 2024-09-23 PROCEDURE — RXMED WILLOW AMBULATORY MEDICATION CHARGE

## 2024-09-23 PROCEDURE — 99214 OFFICE O/P EST MOD 30 MIN: CPT

## 2024-09-23 RX ORDER — CLONAZEPAM 0.5 MG/1
0.5 TABLET ORAL DAILY PRN
Qty: 15 TABLET | Refills: 0 | Status: SHIPPED | OUTPATIENT
Start: 2024-09-23 | End: 2024-10-23

## 2024-09-23 RX ORDER — DEXTROAMPHETAMINE SACCHARATE, AMPHETAMINE ASPARTATE MONOHYDRATE, DEXTROAMPHETAMINE SULFATE AND AMPHETAMINE SULFATE 3.75; 3.75; 3.75; 3.75 MG/1; MG/1; MG/1; MG/1
15 CAPSULE, EXTENDED RELEASE ORAL
Qty: 60 CAPSULE | Refills: 0 | Status: SHIPPED | OUTPATIENT
Start: 2024-09-26 | End: 2024-10-26

## 2024-09-23 RX ORDER — FLUOXETINE HYDROCHLORIDE 40 MG/1
40 CAPSULE ORAL DAILY
Qty: 90 CAPSULE | Refills: 1 | Status: SHIPPED | OUTPATIENT
Start: 2024-09-23 | End: 2025-03-22

## 2024-09-23 RX ORDER — TRAZODONE HYDROCHLORIDE 100 MG/1
100-200 TABLET ORAL NIGHTLY PRN
Qty: 90 TABLET | Refills: 2 | Status: SHIPPED | OUTPATIENT
Start: 2024-09-23 | End: 2025-09-23

## 2024-09-23 RX ORDER — DEXTROAMPHETAMINE SACCHARATE, AMPHETAMINE ASPARTATE MONOHYDRATE, DEXTROAMPHETAMINE SULFATE AND AMPHETAMINE SULFATE 3.75; 3.75; 3.75; 3.75 MG/1; MG/1; MG/1; MG/1
15 CAPSULE, EXTENDED RELEASE ORAL
Qty: 60 CAPSULE | Refills: 0 | Status: SHIPPED | OUTPATIENT
Start: 2024-10-26 | End: 2024-11-25

## 2024-09-23 ASSESSMENT — PAIN SCALES - GENERAL: PAINLEVEL: 0-NO PAIN

## 2024-09-23 NOTE — PROGRESS NOTES
HPI  Glenroy Parker is a 39 y.o. male patient with a CC ADHD, Anxiety, MDD (Major Depressive Disorder), Sleeping Problem, Panic Attack, and OTHER (Anger/irritability) presenting to outpatient treatment for a scheduled psych outpatient psychiatric evaluation.   Pt identify self by name, , and address     2024  Reports mostly stable anxiety and depression on current dose of Prozac.  Reports stable concentration in the morning, but has difficulty towards the end of his shift on current dose of Adderall ER.  States Klonopin has been instrumental in controlling panic attacks.  Reports stable mood with decreased frustration and irritability tendencies on current dose of Abilify.  Sleep and appetite remain unaffected.  Denies substance use except ongoing 1 pack/day cigarettes.  Denies SI/HI/hallucinations/delusions/maki/hypomania.  Overall remains mostly stable except for suboptimal attentiveness/concentration/focus.    Current S/Sx:  -Mood swings: mood swings, shops recklessly  -Depressive mood: see PHQ-9  -Fatigue/Energy: nearly daily  -Feeling hope/help/worthless: yes  -Sleep: sleeps 3-5yrs per night.   -Motivation: low  -Appetite/Weight Changes: increased appetite, gained about 30 lbs in the past 6 months  -Psychosis: denies hallucinations/delusions  -SI/HI: Denies current suicidal intent/plan  -Guns/Weapons at home: guns in the house, locked up in a safe place     -Worry excessively: present, impactful  -Difficulty controlling worry: present, impactful  -Easily fatigued d/t worry: present, impactful  -Poor concentration d/t worry: present, impactful  -Sleep disturbance d/t worry: present, impactful  -Easy irritability d/t worry: present, impactful  -Restless / feeling on edge d/t worry: present, impactful     Panic attacks  Onset: about a 1/5 yrs ago, duration: 20 - 30 mins, frequency: had 2 so far, associated s/sx: heart racing, shaky, sweating, fidgeting, relieving factors: unsure, time, precipitating  "factors: large crowds, leaving the house (since 2021) with the pandemic, last one: last year     HISTORY  PSYCH HISTORY  -Psych Hx: KAROL, MDD  -Psych Hospitalization Hx: denies  -Suicide Attempt Hx: tried to hang self as a teenager x1 (\"realized it was stupid.\")  -Self-Harm/Violence Hx: denies  -Current psych meds: Wellbutrin  mg daily, Hydroxyzine 25 mg BID as needed (causes drowsiness)  -Psych Med Hx: Wellbutrin  mg daily, Hydroxyzine 25 mg as needed, Abilify 5 mg daily (didn't feel any different), Lamotrigine 200 mg daily (didn't feel any different), Mirtazapine 7.5 mg at bedtime, Klonopin 1 mg (didn't help), Trazodone 50 mg (ineffective)     SUBSTANCE USE HISTORY  -Substance Use Hx: denies  -ETOH: occasionally  -Tobacco: about 1 ppd since 16 - 17 yrs now  -Caffeine: 2-5 cups coffee/day depending of level of fatigue  -Substance Abuse Treatment Hx: denies     FAMILY HISTORY  -Family Psych Hx: depression  -Family Suicide Hx: denies  -Family Substance Abuse Hx: dad: alcoholic     SOCIAL HISTORY  -Upbringing: Grew up with both parents. Has 1 brother  -Support system: good  -Trauma: emotional  -Education: some college  -Work: [employer]  -Marital Status: single, fiance  -Children: none  -Living situation: lives in house with fiance  -: denies  -Legal: denies      MEDICAL HISTORY  -PCP: Young Mayo, DO  -TBI/head trauma/LOC/seizure hx: 4-5 concoctions, several LOC     REVIEW OF SYSTEMS  Review of Systems   All other systems reviewed and are negative.       PHYSICAL EXAM  Physical Exam  Psychiatric:         Attention and Perception: Attention and perception normal.         Mood and Affect: Mood and affect normal.         Speech: Speech normal.         Behavior: Behavior normal. Behavior is cooperative.         Thought Content: Thought content normal.         Cognition and Memory: Cognition and memory normal.          IMPRESSION  ADHD, Anxiety, MDD (Major Depressive Disorder), " Sleeping Problem, Panic Attack, and OTHER (Anger/irritability)    Notes stable anxiety and depression on current dose of Prozac.  Klonopin remains helpful in controlling panic attacks.  Mood remains stable with decreased tendencies of anger/irritability/mood swings on current dose of Abilify.  No hallucinations/delusion/maki/hypomania/SI reported. Appetite and sleep are improved. No side effects or substance use concerns at this time.  Discussed to increase the dose of Adderall dosing to improve attention deficit symptoms.  Continue other medications as before.      SI/HI ASSESSMENT  -Risk Assessment: Glenroy Parker is currently a medium chronic risk of suicide and self-harm due to past suicide attempt(s) but not currently endorsing thoughts of suicide.   -Suicidal Risk Factors: male, unmarried/single, prior suicide attempt(s), chronic medical illness, access to weapons, and panic attacks  -Protective Factors: strong coping skills, social support/connectedness, moral objections to suicide, hopefulness/future orientation, marriage/partnership, and employment  -Plan to Reduce Risk: increase coping skills .     PLAN  Reviewed diagnostic impression including subjective and objective data and provided education about Panic attacks, MDD, KAROL, bipolar disorder, and ADHD, etiology, treatment recommendations including medication, therapy, course of treatment and prognosis. Patient amenable to treatment plan.      Dx: MDD, KAROL, ADHD, r/o bipolar 2 d/o  CONTINUE Prozac 40 mg daily  CONTINUE Trazodone 100 mg; take 1 - 2 tabs at bedtime prn for sleep  CONTINUE Klonopin 0.5 mg daily prn for panic attacks (15 tabs more issued)  INCREASE Adderall XR 15 mg BID (2 refills issued until 11/27)   CONTINUE Abilify 5 mg daily   CONTINUE Buspirone 15 mg BID     Reviewed r/b/a, possible side effects of the medication. Client is aware about the benefit outweighs the risk.     Psychotherapy: weekly counseling with Dr. Yang Marquez since a  month ago, missed 2 appts    Labs reviewed     OARRS  I have personally reviewed the OARRS report for Glenroy BRAY Keith. I have considered the risks of abuse, dependence, addiction and diversion. Last filled Adderall ER 10 mg on08/27/2024 (30 caps x 30 days), Klonopin 0.5 mg on 08/27/2024 (5 tabs x 5 days), Oxycodone 5 mg on 03/05/2024 (20 tabs x5 days), Ativan 1 mg 06/10/2024 (10 tabs x 10 days)    Last Urine Drug Screen  Date of Last Screen: ordered    Controlled Substance Agreement:  Date of the Last Agreement: CSA (benzo) 6/10/2024 , CSA (stimulants): 7/22/2024       -Follow-up with this provider in 4 weeks.    - Follow up with physical health providers as scheduled  -Risks/benefits/assessment of medication interventions discussed with pt; pt agreeable to plan. Will continue to monitor for symptoms mgmt and SEs and adjust plan as needed.  -MI to increase coping skills/behavior regulation.  -Safety plan reviewed.  -Call  Psychiatry at [phone] with issues.  -For [facility] residents, [facility] [employer] is a 24/7 hotline you can call for assistance at [phone]) [phone]. Please call 1 or go to your closest Emergency Room if you feel worse. This includes thoughts of hurting yourself or anyone else, or having other troubles such as hearing voices, seeing visions, or having new and scary thoughts about the people around you.

## 2024-09-26 ENCOUNTER — PHARMACY VISIT (OUTPATIENT)
Dept: PHARMACY | Facility: CLINIC | Age: 39
End: 2024-09-26
Payer: MEDICARE

## 2024-09-26 PROCEDURE — RXMED WILLOW AMBULATORY MEDICATION CHARGE

## 2024-09-27 DIAGNOSIS — Z79.4 TYPE 2 DIABETES MELLITUS WITH HYPERGLYCEMIA, WITH LONG-TERM CURRENT USE OF INSULIN: Primary | ICD-10-CM

## 2024-09-27 DIAGNOSIS — E11.65 TYPE 2 DIABETES MELLITUS WITH HYPERGLYCEMIA, WITH LONG-TERM CURRENT USE OF INSULIN: Primary | ICD-10-CM

## 2024-09-27 RX ORDER — HUMAN INSULIN 100 [IU]/ML
INJECTION, SUSPENSION SUBCUTANEOUS
Qty: 15 ML | Refills: 6 | Status: SHIPPED | OUTPATIENT
Start: 2024-09-27

## 2024-09-30 ENCOUNTER — APPOINTMENT (OUTPATIENT)
Dept: DERMATOLOGY | Facility: CLINIC | Age: 39
End: 2024-09-30
Payer: COMMERCIAL

## 2024-09-30 DIAGNOSIS — L73.2 HIDRADENITIS SUPPURATIVA: Primary | ICD-10-CM

## 2024-09-30 PROCEDURE — 99204 OFFICE O/P NEW MOD 45 MIN: CPT | Performed by: NURSE PRACTITIONER

## 2024-09-30 PROCEDURE — 4010F ACE/ARB THERAPY RXD/TAKEN: CPT | Performed by: NURSE PRACTITIONER

## 2024-09-30 PROCEDURE — 3046F HEMOGLOBIN A1C LEVEL >9.0%: CPT | Performed by: NURSE PRACTITIONER

## 2024-09-30 PROCEDURE — 3050F LDL-C >= 130 MG/DL: CPT | Performed by: NURSE PRACTITIONER

## 2024-09-30 PROCEDURE — 3061F NEG MICROALBUMINURIA REV: CPT | Performed by: NURSE PRACTITIONER

## 2024-09-30 RX ORDER — CLINDAMYCIN PHOSPHATE 10 UG/ML
LOTION TOPICAL 2 TIMES DAILY
Qty: 60 ML | Refills: 1 | Status: SHIPPED | OUTPATIENT
Start: 2024-09-30

## 2024-09-30 RX ORDER — DOXYCYCLINE HYCLATE 100 MG
100 TABLET ORAL 2 TIMES DAILY
Qty: 120 TABLET | Refills: 0 | Status: SHIPPED | OUTPATIENT
Start: 2024-09-30 | End: 2024-11-29

## 2024-09-30 RX ORDER — BENZOYL PEROXIDE 100 MG/ML
1 LIQUID TOPICAL DAILY
Qty: 237 G | Refills: 11 | Status: SHIPPED | OUTPATIENT
Start: 2024-09-30 | End: 2025-09-30

## 2024-09-30 NOTE — PROGRESS NOTES
Subjective     Glenroy Parker is a 39 y.o. male who presents for the following: Hidradenitis Suppurativa.     Review of Systems:  No other skin or systemic complaints other than what is documented elsewhere in the note.    The following portions of the chart were reviewed this encounter and updated as appropriate:       Skin Cancer History  No skin cancer on file.    Specialty Problems          Dermatology Problems    Bruised rib, unspecified laterality, initial encounter    Contusion of hand, left    Lumbar contusion    Contusion of rib     Past Medical History:  Glenroy Parker  has a past medical history of Diabetes (Multi), Other specified soft tissue disorders (11/16/2015), and Personal history of other diseases of the digestive system (08/11/2016).    Past Surgical History:  Glenroy Parker  has a past surgical history that includes Tonsillectomy (11/16/2015); Other surgical history (09/12/2016); Neck surgery (02/18/2016); Other surgical history (06/07/2016); Other surgical history (06/07/2016); and Knee arthroscopy w/ debridement (02/18/2016).    Family History:  Patient family history includes Hypertension in his mother; varicose veins in his mother.    Social History:  Glenroy Parker  reports that he has been smoking cigarettes. He has never used smokeless tobacco. He reports current alcohol use. He reports that he does not use drugs.    Allergies:  Patient has no known allergies.    Current Medications / CAM's:    Current Outpatient Medications:     amphetamine-dextroamphetamine XR (Adderall XR) 15 mg 24 hr capsule, Take 1 capsule (15 mg) by mouth 2 times daily (morning and late afternoon). Do not crush or chew. Do not fill before September 26, 2024., Disp: 60 capsule, Rfl: 0    [START ON 10/26/2024] amphetamine-dextroamphetamine XR (Adderall XR) 15 mg 24 hr capsule, Take 1 capsule (15 mg) by mouth 2 times daily (morning and late afternoon). Do not crush or chew. Do not fill before October 26, 2024., Disp:  60 capsule, Rfl: 0    ARIPiprazole (Abilify) 5 mg tablet, Take 1 tablet (5 mg) by mouth once daily., Disp: 30 tablet, Rfl: 2    benzoyl peroxide (Benzac AC) 10 % external wash, Apply 1 Application topically once daily., Disp: 237 g, Rfl: 11    busPIRone (Buspar) 15 mg tablet, Take 1 tablet (15 mg) by mouth 2 times a day., Disp: 60 tablet, Rfl: 2    Clenpiq 10 mg-3.5 gram- 12 gram/175 mL solution, TAKE 1 KIT BY MOUTH 1 TIME FOR 1 DOSE., Disp: , Rfl:     clindamycin (Cleocin T) 1 % lotion, Apply topically 2 times a day., Disp: 60 mL, Rfl: 1    clonazePAM (KlonoPIN) 0.5 mg tablet, Take 1 tablet (0.5 mg) by mouth once daily as needed for anxiety., Disp: 15 tablet, Rfl: 0    Dexcom G7 Sensor device, Change every 10 days, Disp: 3 each, Rfl: 11    doxycycline (Vibra-Tabs) 100 mg tablet, Take 1 tablet (100 mg) by mouth 2 times a day. Take with a full glass of water and do not lie down for at least 30 minutes after., Disp: 120 tablet, Rfl: 0    FLUoxetine (PROzac) 40 mg capsule, Take 1 capsule (40 mg) by mouth once daily., Disp: 90 capsule, Rfl: 1    insulin NPH and regular human (NovoLIN 70-30 FlexPen U-100) 100 unit/mL (70-30) injection, 15 units twice a day with meals, Disp: 15 mL, Rfl: 6    Jardiance 10 mg, Take 1 tablet (10 mg) by mouth once daily., Disp: 90 tablet, Rfl: 2    losartan (Cozaar) 50 mg tablet, Take 1 tablet (50 mg) by mouth once daily., Disp: 90 tablet, Rfl: 3    meloxicam (Mobic) 15 mg tablet, Take 1 tablet (15 mg) by mouth once daily as needed for moderate pain (4 - 6)., Disp: 30 tablet, Rfl: 0    metFORMIN  mg 24 hr tablet, Take 2 tablets (1,000 mg) by mouth 2 times daily (morning and late afternoon)., Disp: 360 tablet, Rfl: 3    minocycline 100 mg capsule, Take 1 capsule (100 mg) by mouth early in the morning.., Disp: , Rfl:     omeprazole (PriLOSEC) 40 mg DR capsule, Take by mouth., Disp: , Rfl:     ondansetron (Zofran) 4 mg tablet, Take 1 tablet by mouth every 8 hours as needed, Disp: 30  "tablet, Rfl: 0    pen needle, diabetic (BD Ale 2nd Gen Pen Needle) 32 gauge x 5/32\" needle, Use 2 a day with insulin as directed, Disp: 200 each, Rfl: 3    semaglutide (Ozempic) 1 mg/dose (4 mg/3 mL) pen injector, Inject 1 mg under the skin 1 (one) time per week., Disp: 3 mL, Rfl: 6    traZODone (Desyrel) 100 mg tablet, Take 1-2 tablets (100-200 mg) by mouth as needed at bedtime for sleep., Disp: 90 tablet, Rfl: 2     Objective   Well appearing patient in no apparent distress; mood and affect are within normal limits.      Assessment/Plan   1. Hidradenitis suppurativa  Left Abdomen (side) - Lower, Left Axilla, Pubic, Right Abdomen (side) - Lower, Right Axilla  Large nflammatory papules, sinus tract formation, scarring to axilla, abdomen, and groin.  He has a family history of \"boils\" (mother) and has been getting lesions since age 19.      -Discussed nature of condition  -Discussed treatment options  -Discussed/information given on the potential adverse effects of rx Doxycycline, including but not limited to, GI upset (nausea, vomiting, diarrhea), photosensitivity (and the need to wear SPF and avoid prolonged outdoor exposure), esophagitis (the patient is to take the medication with food & water and to remain upright for 1 hour after taking medication), and headaches.   -Doxycycline may may be taken with food to avoid GI upset; if taking with milk, multivitamins or antacids, the absorption of Doxycycline may be decreased but this is usually not an issue when treating common dermatologic conditions.  -Recommend the shortest appropriate course of oral antibiotics to reduce the chance of antibiotic resistance among bacteria.  -For patients of child-bearing potential, discussed that patient should not become pregnant while taking this medication as it can cause damage to the infant's teeth and other issues during pregnancy  -Recommend:  Discontinue smoking  He seen some improvement after a 250lb  On Ozempic and " metformin for diabetes which may be helpful for his HS        clindamycin (Cleocin T) 1 % lotion - Left Abdomen (side) - Lower, Left Axilla, Pubic, Right Abdomen (side) - Lower, Right Axilla  Apply topically 2 times a day.    Related Medications  benzoyl peroxide (Benzac AC) 10 % external wash  Apply 1 Application topically once daily.    doxycycline (Vibra-Tabs) 100 mg tablet  Take 1 tablet (100 mg) by mouth 2 times a day. Take with a full glass of water and do not lie down for at least 30 minutes after.

## 2024-10-08 ENCOUNTER — OFFICE VISIT (OUTPATIENT)
Dept: ORTHOPEDIC SURGERY | Facility: CLINIC | Age: 39
End: 2024-10-08
Payer: COMMERCIAL

## 2024-10-08 VITALS — WEIGHT: 315 LBS | HEIGHT: 75 IN | BODY MASS INDEX: 39.17 KG/M2

## 2024-10-08 DIAGNOSIS — S83.271A COMPLEX TEAR OF LATERAL MENISCUS OF RIGHT KNEE AS CURRENT INJURY, INITIAL ENCOUNTER: Primary | ICD-10-CM

## 2024-10-08 PROCEDURE — 3046F HEMOGLOBIN A1C LEVEL >9.0%: CPT | Performed by: ORTHOPAEDIC SURGERY

## 2024-10-08 PROCEDURE — 3050F LDL-C >= 130 MG/DL: CPT | Performed by: ORTHOPAEDIC SURGERY

## 2024-10-08 PROCEDURE — 99214 OFFICE O/P EST MOD 30 MIN: CPT | Performed by: ORTHOPAEDIC SURGERY

## 2024-10-08 PROCEDURE — 3008F BODY MASS INDEX DOCD: CPT | Performed by: ORTHOPAEDIC SURGERY

## 2024-10-08 PROCEDURE — 3061F NEG MICROALBUMINURIA REV: CPT | Performed by: ORTHOPAEDIC SURGERY

## 2024-10-08 PROCEDURE — 4010F ACE/ARB THERAPY RXD/TAKEN: CPT | Performed by: ORTHOPAEDIC SURGERY

## 2024-10-09 PROCEDURE — RXMED WILLOW AMBULATORY MEDICATION CHARGE

## 2024-10-09 NOTE — PROGRESS NOTES
39-year-old is seen with right knee pain.  He has been having persistent moderate throbbing pain in the right knee.  Pain is worse with standing and walking and going up and down stairs and getting up and down from a chair and in and out of a car.  He has intermittent locking and giving way.    Pleasant and in no acute distress. Ambulates with a mildly antalgic gait.  Right knee range of motion is 3-120. There is a mild effusion and no instability. The knee is stable to varus and valgus stress Lachman and posterior drawer. There is lateral joint line tenderness Rick's is positive with pain laterally.  The Left knee range of motion is 0-130 without effusion or instability. There is no tenderness around the left knee. Bilateral lower extremities are well-perfused the skin is intact and muscle tone is adequate.  There is adequate range of motion of his hips without significant pain.    MRI of the left knee is personally reviewed and there is a horizontal tear through the body and anterior horn of the lateral meniscus.    Multiple x-ray views of the right knee are personally reviewed and there is no acute bony abnormality.    A detailed discussion about the meniscus tear was done.  Treatment options including no treatment reviewed.  His most recent hemoglobin A1c is 9.9.  Increased risk of complications with surgery and infection in particular with poor blood glucose control was discussed.  Importance of improving his blood glucose control prior to considering surgery was discussed.  Discussion was done about knee arthroscopy and once his hemoglobin A1c is in an appropriate range if he continues with symptoms and arthroscopy and partial meniscectomy would be done.

## 2024-10-10 ENCOUNTER — PHARMACY VISIT (OUTPATIENT)
Dept: PHARMACY | Facility: CLINIC | Age: 39
End: 2024-10-10
Payer: MEDICARE

## 2024-10-18 ENCOUNTER — APPOINTMENT (OUTPATIENT)
Dept: BEHAVIORAL HEALTH | Facility: CLINIC | Age: 39
End: 2024-10-18
Payer: COMMERCIAL

## 2024-10-18 DIAGNOSIS — F90.0 ATTENTION DEFICIT HYPERACTIVITY DISORDER (ADHD), PREDOMINANTLY INATTENTIVE TYPE: ICD-10-CM

## 2024-10-18 DIAGNOSIS — F41.1 GAD (GENERALIZED ANXIETY DISORDER): ICD-10-CM

## 2024-10-18 DIAGNOSIS — F34.1 PERSISTENT DEPRESSIVE DISORDER: ICD-10-CM

## 2024-10-18 PROCEDURE — 3046F HEMOGLOBIN A1C LEVEL >9.0%: CPT | Performed by: PSYCHOLOGIST

## 2024-10-18 PROCEDURE — 3061F NEG MICROALBUMINURIA REV: CPT | Performed by: PSYCHOLOGIST

## 2024-10-18 PROCEDURE — 4010F ACE/ARB THERAPY RXD/TAKEN: CPT | Performed by: PSYCHOLOGIST

## 2024-10-18 PROCEDURE — 3050F LDL-C >= 130 MG/DL: CPT | Performed by: PSYCHOLOGIST

## 2024-10-18 PROCEDURE — 90837 PSYTX W PT 60 MINUTES: CPT | Performed by: PSYCHOLOGIST

## 2024-10-19 NOTE — PROGRESS NOTES
12 PM [address] Ind Psych for Persistent Depressive Dx and KAROL (60 min, 1205-100)  In-person. Supportive/CBT.  Patient last seen a month ago.   He continues to report depressive and anxious symptoms.  Has had some adjustments to his medication have not been successful.  He is currently on Adderall for his ADHD which she believes helps a bit with concentration.  Also still on fluoxetine.  Still thinking a lot about his father, particularly around the holiday season and his father's birthday is coming up.  Also having some significant stressors including auto repairs that have been costly.  Finances have been tight.  He continues to have problems with his leg and needs to get his A1c under control so that he can have a meniscus repair in December.  Continues to still be on the Ozempic.  Reported nightmares recently that have kept him up at night.  Also some concerns about his mother's health who had a recent mammogram but it turned out to be a cyst that will need to be monitored versus cancer.  Completed [facility] paperwork for the patient does state encouraging an additional break and consideration being given for him moving to a dayshift which may help promote mood stability.   Patient will be getting  on Halloween, small wedding, and they will have a reception next year invite a lot of guests. Next: 1 month.

## 2024-10-21 PROCEDURE — RXMED WILLOW AMBULATORY MEDICATION CHARGE

## 2024-10-25 ENCOUNTER — PHARMACY VISIT (OUTPATIENT)
Dept: PHARMACY | Facility: CLINIC | Age: 39
End: 2024-10-25
Payer: COMMERCIAL

## 2024-11-01 PROBLEM — E11.9 DIABETES MELLITUS TYPE II, CONTROLLED (MULTI): Status: ACTIVE | Noted: 2024-11-01

## 2024-11-04 ENCOUNTER — APPOINTMENT (OUTPATIENT)
Dept: BEHAVIORAL HEALTH | Facility: CLINIC | Age: 39
End: 2024-11-04
Payer: COMMERCIAL

## 2024-11-04 DIAGNOSIS — F41.0 PANIC DISORDER: ICD-10-CM

## 2024-11-04 DIAGNOSIS — F41.1 GAD (GENERALIZED ANXIETY DISORDER): ICD-10-CM

## 2024-11-04 DIAGNOSIS — F90.9 ATTENTION DEFICIT HYPERACTIVITY DISORDER (ADHD), UNSPECIFIED ADHD TYPE: ICD-10-CM

## 2024-11-04 DIAGNOSIS — Z13.39 ADHD (ATTENTION DEFICIT HYPERACTIVITY DISORDER) EVALUATION: ICD-10-CM

## 2024-11-04 DIAGNOSIS — F34.1 PERSISTENT DEPRESSIVE DISORDER: ICD-10-CM

## 2024-11-04 PROCEDURE — 99214 OFFICE O/P EST MOD 30 MIN: CPT

## 2024-11-04 PROCEDURE — 3046F HEMOGLOBIN A1C LEVEL >9.0%: CPT

## 2024-11-04 PROCEDURE — 4010F ACE/ARB THERAPY RXD/TAKEN: CPT

## 2024-11-04 PROCEDURE — 3061F NEG MICROALBUMINURIA REV: CPT

## 2024-11-04 PROCEDURE — 3050F LDL-C >= 130 MG/DL: CPT

## 2024-11-04 RX ORDER — DEXTROAMPHETAMINE SACCHARATE, AMPHETAMINE ASPARTATE MONOHYDRATE, DEXTROAMPHETAMINE SULFATE AND AMPHETAMINE SULFATE 3.75; 3.75; 3.75; 3.75 MG/1; MG/1; MG/1; MG/1
15 CAPSULE, EXTENDED RELEASE ORAL
Qty: 60 CAPSULE | Refills: 0 | Status: SHIPPED | OUTPATIENT
Start: 2024-12-02 | End: 2025-01-01

## 2024-11-04 RX ORDER — BUSPIRONE HYDROCHLORIDE 15 MG/1
15 TABLET ORAL 2 TIMES DAILY
Qty: 180 TABLET | Refills: 1 | Status: SHIPPED | OUTPATIENT
Start: 2024-11-04 | End: 2025-05-03

## 2024-11-04 RX ORDER — DEXTROAMPHETAMINE SACCHARATE, AMPHETAMINE ASPARTATE MONOHYDRATE, DEXTROAMPHETAMINE SULFATE AND AMPHETAMINE SULFATE 3.75; 3.75; 3.75; 3.75 MG/1; MG/1; MG/1; MG/1
15 CAPSULE, EXTENDED RELEASE ORAL
Qty: 60 CAPSULE | Refills: 0 | Status: SHIPPED | OUTPATIENT
Start: 2025-01-01 | End: 2025-01-31

## 2024-11-04 RX ORDER — CLONAZEPAM 0.5 MG/1
0.5 TABLET ORAL DAILY PRN
Qty: 15 TABLET | Refills: 0 | Status: SHIPPED | OUTPATIENT
Start: 2024-11-04 | End: 2025-01-03

## 2024-11-04 RX ORDER — ARIPIPRAZOLE 5 MG/1
5 TABLET ORAL DAILY
Qty: 90 TABLET | Refills: 1 | Status: SHIPPED | OUTPATIENT
Start: 2024-11-04 | End: 2025-05-03

## 2024-11-04 NOTE — PROGRESS NOTES
HPI  Glenroy Parker is a 39 y.o. male patient with a CC ADHD, Anxiety, Depression, Sleeping Problem, and Panic Attack presenting to outpatient treatment for a scheduled psych outpatient psychiatric evaluation.   Pt identify self by name, , and address     2024  States he's not been on Adderall for over a week because pharmacy ran out d/t the shortage but received a message moments ago that it was filled, will pick it up soon. Otherwise, anxiety, depression have mostly been stable on Prozac. Reports improved elevated anxiety and panic attacks, has not been taking Klonopin as much as before, has 7 tabs left from last prescription 4 wks ago. Sleep and appetite remain unaffected.  Denies substance use except ongoing 1 pack/day cigarettes.  Denies SI/HI/hallucinations/delusions/maki/hypomania.  Overall remains mostly stable.    Current S/Sx:  -Mood swings: mood swings, shops recklessly  -Depressive mood: see PHQ-9  -Fatigue/Energy: nearly daily  -Feeling hope/help/worthless: yes  -Sleep: sleeps 3-5yrs per night.   -Motivation: low  -Appetite/Weight Changes: increased appetite, gained about 30 lbs in the past 6 months  -Psychosis: denies hallucinations/delusions  -SI/HI: Denies current suicidal intent/plan  -Guns/Weapons at home: guns in the house, locked up in a safe place     -Worry excessively: present, impactful  -Difficulty controlling worry: present, impactful  -Easily fatigued d/t worry: present, impactful  -Poor concentration d/t worry: present, impactful  -Sleep disturbance d/t worry: present, impactful  -Easy irritability d/t worry: present, impactful  -Restless / feeling on edge d/t worry: present, impactful     Panic attacks  Onset: about a 1/5 yrs ago, duration: 20 - 30 mins, frequency: had 2 so far, associated s/sx: heart racing, shaky, sweating, fidgeting, relieving factors: unsure, time, precipitating factors: large crowds, leaving the house (since ) with the pandemic, last one: last year    "  HISTORY  PSYCH HISTORY  -Psych Hx: KAROL, MDD  -Psych Hospitalization Hx: denies  -Suicide Attempt Hx: tried to hang self as a teenager x1 (\"realized it was stupid.\")  -Self-Harm/Violence Hx: denies  -Current psych meds: Wellbutrin  mg daily, Hydroxyzine 25 mg BID as needed (causes drowsiness)  -Psych Med Hx: Wellbutrin  mg daily, Hydroxyzine 25 mg as needed, Abilify 5 mg daily (didn't feel any different), Lamotrigine 200 mg daily (didn't feel any different), Mirtazapine 7.5 mg at bedtime, Klonopin 1 mg (didn't help), Trazodone 50 mg (ineffective)     SUBSTANCE USE HISTORY  -Substance Use Hx: denies  -ETOH: occasionally  -Tobacco: about 1 ppd since 16 - 17 yrs now  -Caffeine: 2-5 cups coffee/day depending of level of fatigue  -Substance Abuse Treatment Hx: denies     FAMILY HISTORY  -Family Psych Hx: depression  -Family Suicide Hx: denies  -Family Substance Abuse Hx: dad: alcoholic     SOCIAL HISTORY  -Upbringing: Grew up with both parents. 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       PHYSICAL EXAM  Physical Exam  Psychiatric:         Attention and Perception: Attention and perception normal.         Mood and Affect: Mood and affect normal.         Speech: Speech normal.         Behavior: Behavior normal. Behavior is cooperative.         Thought Content: Thought content normal.         Cognition and Memory: Cognition and memory normal.          IMPRESSION  ADHD, Anxiety, Depression, Sleeping Problem, and Panic Attack    Notes stable anxiety and depression on current dose of Prozac with bouts of " elevated anxiety.  Klonopin remains helpful in controlling panic attacks, 7 tabs left on hand, 15 tabs added for total of 22 tabs x 60 days.  Notes stable mood with manageable irritability/mood swings on current dose of Abilify.  No hallucinations/delusion/maki/hypomania/SI reported. Appetite and sleep are stable. No side effects or substance use concerns at this time.  Discussed medications as before and follow up in 7 wks.      SI/HI ASSESSMENT  -Risk Assessment: Glenroy Parker is currently a medium chronic risk of suicide and self-harm due to past suicide attempt(s) but not currently endorsing thoughts of suicide.   -Suicidal Risk Factors: male, unmarried/single, prior suicide attempt(s), chronic medical illness, access to weapons, and panic attacks  -Protective Factors: strong coping skills, social support/connectedness, moral objections to suicide, hopefulness/future orientation, marriage/partnership, and employment  -Plan to Reduce Risk: increase coping skills .     PLAN  Reviewed diagnostic impression including subjective and objective data and provided education about Panic attacks, MDD, KAROL, bipolar disorder, and ADHD, etiology, treatment recommendations including medication, therapy, course of treatment and prognosis. Patient amenable to treatment plan.      Dx: MDD, KAROL, ADHD, r/o bipolar 2 d/o  CONTINUE Prozac 40 mg daily  CONTINUE Trazodone 100 mg; take 1 - 2 tabs at bedtime prn for sleep  CONTINUE Klonopin 0.5 mg daily prn for panic attacks (15 tabs more issued)  CONTINUE Adderall XR 15 mg BID (2 refills issued until 11/27)   CONTINUE Abilify 5 mg daily   CONTINUE Buspirone 15 mg BID     Reviewed r/b/a, possible side effects of the medication. Client is aware about the benefit outweighs the risk.     Psychotherapy: weekly counseling with Dr. Yang Marquez since a month ago, missed 2 appts    Labs reviewed     OARRS  I have personally reviewed the OARRS report for Glenroy Parker. I have considered the  risks of abuse, dependence, addiction and diversion. Last filled Adderall ER 15 mg on 09/26/2024 (60 caps x 30 days), Klonopin 0.5 mg on 09/23/2024  (15 tabs x 15 days), Oxycodone 5 mg on 03/05/2024 (20 tabs x5 days), Ativan 1 mg 06/10/2024 (10 tabs x 10 days)    Last Urine Drug Screen  Date of Last Screen: ordered    Controlled Substance Agreement:  Date of the Last Agreement: CSA (benzo) 6/10/2024 , CSA (stimulants): 7/22/2024       -Follow-up with this provider in 7 weeks.    - Follow up with physical health providers as scheduled  -Risks/benefits/assessment of medication interventions discussed with pt; pt agreeable to plan. Will continue to monitor for symptoms mgmt and SEs and adjust plan as needed.  -MI to increase coping skills/behavior regulation.  -Safety plan reviewed.  -Call  Psychiatry at [phone] with issues.  -For [facility] residents, [facility] is a 24/7 hotline you can call for assistance at [phone]. Please call 911 or go to your closest Emergency Room if you feel worse. This includes thoughts of hurting yourself or anyone else, or having other troubles such as hearing voices, seeing visions, or having new and scary thoughts about the people around you.

## 2024-11-06 DIAGNOSIS — Z13.39 ADHD (ATTENTION DEFICIT HYPERACTIVITY DISORDER) EVALUATION: ICD-10-CM

## 2024-11-06 PROCEDURE — RXMED WILLOW AMBULATORY MEDICATION CHARGE

## 2024-11-06 RX ORDER — DEXTROAMPHETAMINE SACCHARATE, AMPHETAMINE ASPARTATE MONOHYDRATE, DEXTROAMPHETAMINE SULFATE AND AMPHETAMINE SULFATE 3.75; 3.75; 3.75; 3.75 MG/1; MG/1; MG/1; MG/1
15 CAPSULE, EXTENDED RELEASE ORAL
Qty: 50 CAPSULE | Refills: 0 | Status: SHIPPED | OUTPATIENT
Start: 2024-11-06 | End: 2024-12-02

## 2024-11-07 ENCOUNTER — PHARMACY VISIT (OUTPATIENT)
Dept: PHARMACY | Facility: CLINIC | Age: 39
End: 2024-11-07
Payer: MEDICARE

## 2024-11-15 ENCOUNTER — APPOINTMENT (OUTPATIENT)
Dept: BEHAVIORAL HEALTH | Facility: CLINIC | Age: 39
End: 2024-11-15
Payer: COMMERCIAL

## 2024-11-15 DIAGNOSIS — F41.1 GAD (GENERALIZED ANXIETY DISORDER): ICD-10-CM

## 2024-11-15 DIAGNOSIS — F34.1 PERSISTENT DEPRESSIVE DISORDER: ICD-10-CM

## 2024-11-15 DIAGNOSIS — F90.0 ATTENTION DEFICIT HYPERACTIVITY DISORDER (ADHD), PREDOMINANTLY INATTENTIVE TYPE: ICD-10-CM

## 2024-11-15 PROCEDURE — 3061F NEG MICROALBUMINURIA REV: CPT | Performed by: PSYCHOLOGIST

## 2024-11-15 PROCEDURE — 90837 PSYTX W PT 60 MINUTES: CPT | Performed by: PSYCHOLOGIST

## 2024-11-15 PROCEDURE — 3050F LDL-C >= 130 MG/DL: CPT | Performed by: PSYCHOLOGIST

## 2024-11-15 PROCEDURE — 3046F HEMOGLOBIN A1C LEVEL >9.0%: CPT | Performed by: PSYCHOLOGIST

## 2024-11-15 PROCEDURE — 4010F ACE/ARB THERAPY RXD/TAKEN: CPT | Performed by: PSYCHOLOGIST

## 2024-11-15 NOTE — PROGRESS NOTES
8 AM 56[address] Indiv Psych for Persistent Depressive Dx and KAROL (60 min, 805-900)  In-person. Supportive/CBT.  Patient has been more depressed.  Some of this attributed to triggers regarding father's death in father's birthday anniversary.  Also thinking more about father over the holidays.  Ran out of Adderall 1 day and missed a day from work.  Reported there is still an Adderall shortage.  Tendency to be more focused when he is on his stimulant.  Will be moving his work space upstairs to have less distractions and hopefully more productive.  Got  on Halloween had a nice ceremony.  Will be hosting his family for Thanksgiving.  Has decided to learn [employer] and has not been practicing that.  He is applied for another position at work as a  and also applied for morning shift.  FMLA paperwork completed this date for intermittent FMLA. Next: 6 weeks.

## 2024-11-19 ENCOUNTER — APPOINTMENT (OUTPATIENT)
Dept: PRIMARY CARE | Facility: CLINIC | Age: 39
End: 2024-11-19
Payer: COMMERCIAL

## 2024-11-20 ENCOUNTER — APPOINTMENT (OUTPATIENT)
Dept: PRIMARY CARE | Facility: CLINIC | Age: 39
End: 2024-11-20
Payer: COMMERCIAL

## 2024-11-20 ENCOUNTER — HOSPITAL ENCOUNTER (OUTPATIENT)
Dept: RADIOLOGY | Facility: CLINIC | Age: 39
Discharge: HOME | End: 2024-11-20
Payer: COMMERCIAL

## 2024-11-20 ENCOUNTER — OFFICE VISIT (OUTPATIENT)
Dept: URGENT CARE | Age: 39
End: 2024-11-20
Payer: COMMERCIAL

## 2024-11-20 VITALS
DIASTOLIC BLOOD PRESSURE: 88 MMHG | OXYGEN SATURATION: 97 % | TEMPERATURE: 97.3 F | RESPIRATION RATE: 14 BRPM | HEART RATE: 95 BPM | SYSTOLIC BLOOD PRESSURE: 130 MMHG

## 2024-11-20 DIAGNOSIS — R06.02 SHORTNESS OF BREATH: ICD-10-CM

## 2024-11-20 DIAGNOSIS — R05.1 ACUTE COUGH: ICD-10-CM

## 2024-11-20 DIAGNOSIS — J40 BRONCHITIS: Primary | ICD-10-CM

## 2024-11-20 PROCEDURE — 3075F SYST BP GE 130 - 139MM HG: CPT | Performed by: NURSE PRACTITIONER

## 2024-11-20 PROCEDURE — 3046F HEMOGLOBIN A1C LEVEL >9.0%: CPT | Performed by: NURSE PRACTITIONER

## 2024-11-20 PROCEDURE — 3050F LDL-C >= 130 MG/DL: CPT | Performed by: NURSE PRACTITIONER

## 2024-11-20 PROCEDURE — 71046 X-RAY EXAM CHEST 2 VIEWS: CPT | Performed by: RADIOLOGY

## 2024-11-20 PROCEDURE — 4010F ACE/ARB THERAPY RXD/TAKEN: CPT | Performed by: NURSE PRACTITIONER

## 2024-11-20 PROCEDURE — 3061F NEG MICROALBUMINURIA REV: CPT | Performed by: NURSE PRACTITIONER

## 2024-11-20 PROCEDURE — 3079F DIAST BP 80-89 MM HG: CPT | Performed by: NURSE PRACTITIONER

## 2024-11-20 PROCEDURE — 71046 X-RAY EXAM CHEST 2 VIEWS: CPT

## 2024-11-20 PROCEDURE — 99214 OFFICE O/P EST MOD 30 MIN: CPT | Performed by: NURSE PRACTITIONER

## 2024-11-20 RX ORDER — BENZONATATE 200 MG/1
200 CAPSULE ORAL 3 TIMES DAILY PRN
Qty: 42 CAPSULE | Refills: 0 | Status: SHIPPED | OUTPATIENT
Start: 2024-11-20 | End: 2025-05-19

## 2024-11-20 RX ORDER — PREDNISONE 20 MG/1
20 TABLET ORAL 2 TIMES DAILY
Qty: 10 TABLET | Refills: 0 | Status: SHIPPED | OUTPATIENT
Start: 2024-11-20 | End: 2024-11-25

## 2024-11-20 RX ORDER — AZITHROMYCIN 250 MG/1
TABLET, FILM COATED ORAL
Qty: 6 TABLET | Refills: 0 | Status: SHIPPED | OUTPATIENT
Start: 2024-11-20

## 2024-11-20 RX ORDER — ALBUTEROL SULFATE 90 UG/1
2 INHALANT RESPIRATORY (INHALATION) EVERY 4 HOURS PRN
Qty: 8.5 G | Refills: 0 | Status: SHIPPED | OUTPATIENT
Start: 2024-11-20 | End: 2025-11-20

## 2024-11-20 ASSESSMENT — ENCOUNTER SYMPTOMS
CHEST TIGHTNESS: 1
SORE THROAT: 1
SHORTNESS OF BREATH: 1
CHILLS: 1
SINUS PRESSURE: 1
WHEEZING: 1
SINUS PAIN: 1
FEVER: 0
COUGH: 1
FATIGUE: 1
HEADACHES: 1

## 2024-11-20 NOTE — PROGRESS NOTES
Subjective   Patient ID: Glenroy Parker is a 39 y.o. male. They present today with a chief complaint of Sore Throat, Nasal Congestion, Cough, URI, Headache, and Sinusitis (Sick X 6 days. Did 2 @ home covid tests both were negative. TD-MA).    History of Present Illness    Sore Throat   Associated symptoms include congestion, coughing, headaches and shortness of breath. Pertinent negatives include no ear pain.   Cough  Associated symptoms include chills, headaches, postnasal drip, a sore throat, shortness of breath and wheezing. Pertinent negatives include no ear pain or fever.   URI  Presenting symptoms: congestion, cough, fatigue and sore throat    Presenting symptoms: no ear pain and no fever    Associated symptoms: headaches, sinus pain and wheezing    Headache  Associated symptoms: congestion, cough, drainage, fatigue, sinus pressure, sore throat and URI    Associated symptoms: no ear pain and no fever    Sinusitis  Associated symptoms: chills, congestion, cough, fatigue, headaches, shortness of breath, sore throat and wheezing    Associated symptoms: no ear pain and no fever        Patient presents to urgent care for complaints of cough, congestion, and sinus pressure for the past 6 days.  Patient states he took 2 at home COVID test which were both negative.  He has been using over-the-counter cough medication and reports no improvement of symptoms.  States the past day the cough has worsened and feels like he is wheezing at times.  Patient denies history of asthma, but states he is a smoker.  Past Medical History  Allergies as of 11/20/2024    (No Known Allergies)       (Not in a hospital admission)       Past Medical History:   Diagnosis Date    Diabetes (Multi)     Other specified soft tissue disorders 11/16/2015    Leg swelling    Personal history of other diseases of the digestive system 08/11/2016    History of esophageal reflux       Past Surgical History:   Procedure Laterality Date    KNEE ARTHROSCOPY  W/ DEBRIDEMENT  02/18/2016    Arthroscopy Knee Right    NECK SURGERY  02/18/2016    Neck Surgery    OTHER SURGICAL HISTORY  09/12/2016    Stab Phlebectomy Of Varicose Veins    OTHER SURGICAL HISTORY  06/07/2016    Venous Ligation With Stripping    OTHER SURGICAL HISTORY  06/07/2016    Endovenous Ablation Of Incompetent Vein    TONSILLECTOMY  11/16/2015    Tonsillectomy        reports that he has been smoking cigarettes. He has never used smokeless tobacco. He reports current alcohol use. He reports that he does not use drugs.    Review of Systems  Review of Systems   Constitutional:  Positive for chills and fatigue. Negative for fever.   HENT:  Positive for congestion, postnasal drip, sinus pressure, sinus pain and sore throat. Negative for ear pain.    Respiratory:  Positive for cough, chest tightness, shortness of breath and wheezing.    Neurological:  Positive for headaches.                                  Objective    Vitals:    11/20/24 1212   BP: 130/88   Pulse: 95   Resp: 14   Temp: 36.3 °C (97.3 °F)   SpO2: 97%     No LMP for male patient.    Physical Exam  Vitals reviewed.   Constitutional:       Appearance: Normal appearance.   HENT:      Head: Normocephalic.      Right Ear: Tympanic membrane, ear canal and external ear normal.      Left Ear: Tympanic membrane, ear canal and external ear normal.      Nose:      Right Sinus: Maxillary sinus tenderness present. No frontal sinus tenderness.      Left Sinus: Maxillary sinus tenderness present. No frontal sinus tenderness.      Mouth/Throat:      Mouth: Mucous membranes are moist.      Pharynx: Postnasal drip present.   Cardiovascular:      Rate and Rhythm: Normal rate and regular rhythm.      Heart sounds: Normal heart sounds.   Pulmonary:      Effort: Pulmonary effort is normal.      Breath sounds: Examination of the right-upper field reveals wheezing. Examination of the left-upper field reveals wheezing. Wheezing present.      Comments: All other lobes  clear  Skin:     General: Skin is warm and dry.   Neurological:      Mental Status: He is alert.         Procedures    Point of Care Test & Imaging Results from this visit  No results found for this visit on 11/20/24.   No results found.    Diagnostic study results (if any) were reviewed by DARRICK Lee.    Assessment/Plan   Allergies, medications, history, and pertinent labs/EKGs/Imaging reviewed by DARRICK Lee.     Medical Decision Making  === 11/20/24 ===    XR CHEST 2 VIEWS    - Impression -  1.  No evidence of acute cardiopulmonary process.        MACRO:  None    Signed by: Bruno Phelps 11/20/2024 12:37 PM  Dictation workstation:   [id number]     Will start patient on prednisone, Z-Sy, albuterol inhaler and Tessalon Perles for acute bronchitis.  Patient was told to stay well-hydrated with fluids.  You may take Tylenol or ibuprofen as needed for pain.  Follow-up with your PCP if your symptoms are not improving the next week    Orders and Diagnoses  Diagnoses and all orders for this visit:  Acute cough  -     XR chest 2 views; Future  Shortness of breath  -     XR chest 2 views; Future      Medical Admin Record      Patient disposition: Home    Electronically signed by DARRICK Lee  12:31 PM

## 2024-11-22 ENCOUNTER — PHARMACY VISIT (OUTPATIENT)
Dept: PHARMACY | Facility: CLINIC | Age: 39
End: 2024-11-22
Payer: MEDICARE

## 2024-11-22 PROCEDURE — RXMED WILLOW AMBULATORY MEDICATION CHARGE

## 2024-11-25 ENCOUNTER — APPOINTMENT (OUTPATIENT)
Dept: DERMATOLOGY | Facility: CLINIC | Age: 39
End: 2024-11-25
Payer: COMMERCIAL

## 2024-11-25 DIAGNOSIS — L73.2 HIDRADENITIS SUPPURATIVA: Primary | ICD-10-CM

## 2024-11-25 PROCEDURE — 99213 OFFICE O/P EST LOW 20 MIN: CPT | Performed by: NURSE PRACTITIONER

## 2024-11-25 PROCEDURE — 4010F ACE/ARB THERAPY RXD/TAKEN: CPT | Performed by: NURSE PRACTITIONER

## 2024-11-25 PROCEDURE — 3046F HEMOGLOBIN A1C LEVEL >9.0%: CPT | Performed by: NURSE PRACTITIONER

## 2024-11-25 PROCEDURE — 3061F NEG MICROALBUMINURIA REV: CPT | Performed by: NURSE PRACTITIONER

## 2024-11-25 PROCEDURE — 3050F LDL-C >= 130 MG/DL: CPT | Performed by: NURSE PRACTITIONER

## 2024-11-25 RX ORDER — BENZOYL PEROXIDE 100 MG/ML
1 LIQUID TOPICAL DAILY
Qty: 237 G | Refills: 11 | Status: SHIPPED | OUTPATIENT
Start: 2024-11-25 | End: 2025-11-25

## 2024-11-25 RX ORDER — CLINDAMYCIN PHOSPHATE 10 UG/ML
LOTION TOPICAL 2 TIMES DAILY
Qty: 60 ML | Refills: 1 | Status: SHIPPED | OUTPATIENT
Start: 2024-11-25

## 2024-11-25 NOTE — PROGRESS NOTES
Subjective     Glenroy Parker is a 39 y.o. male who presents for the following: Hidradenitis Suppurativa, Wart, and lesion.   Established patient in for 2 month follow up last seen and prescribed   Clindamycin lotion 1%, BPO 10% wash , and doxycycline. Patient states that the areas of HS are better admits to forgetting to taking/using medication consistently.     Patient would like to have wart to right hand first finger examined today per patient present for two weeks.   Patient would also like to address upper back lesion present for years.     Review of Systems:  No other skin or systemic complaints other than what is documented elsewhere in the note.    The following portions of the chart were reviewed this encounter and updated as appropriate:       Skin Cancer History  No skin cancer on file.    Specialty Problems          Dermatology Problems    Bruised rib, unspecified laterality, initial encounter    Contusion of hand, left    Lumbar contusion    Contusion of rib     Past Medical History:  Glenroy Parker  has a past medical history of Diabetes (Multi), Other specified soft tissue disorders (11/16/2015), and Personal history of other diseases of the digestive system (08/11/2016).    Past Surgical History:  Glenroy Parker  has a past surgical history that includes Tonsillectomy (11/16/2015); Other surgical history (09/12/2016); Neck surgery (02/18/2016); Other surgical history (06/07/2016); Other surgical history (06/07/2016); and Knee arthroscopy w/ debridement (02/18/2016).    Family History:  Patient family history includes Hypertension in his mother; varicose veins in his mother.    Social History:  Glenroy Parker  reports that he has been smoking cigarettes. He has never used smokeless tobacco. He reports current alcohol use. He reports that he does not use drugs.    Allergies:  Patient has no known allergies.    Current Medications / CAM's:    Current Outpatient Medications:     albuterol (ProAir HFA)  90 mcg/actuation inhaler, Inhale 2 puffs every 4 hours if needed for wheezing or shortness of breath., Disp: 8.5 g, Rfl: 0    [START ON 12/2/2024] amphetamine-dextroamphetamine XR (Adderall XR) 15 mg 24 hr capsule, Take 1 capsule (15 mg) by mouth 2 times daily (morning and late afternoon). Do not crush or chew. Do not fill before October 26, 2024., Disp: 60 capsule, Rfl: 0    [START ON 1/1/2025] amphetamine-dextroamphetamine XR (Adderall XR) 15 mg 24 hr capsule, Take 1 capsule (15 mg) by mouth 2 times daily (morning and late afternoon). Do not crush or chew. Do not fill before September 26, 2024., Disp: 60 capsule, Rfl: 0    amphetamine-dextroamphetamine XR (Adderall XR) 15 mg 24 hr capsule, Take 1 capsule (15 mg) by mouth 2 times daily (morning and late afternoon) for 25 days. Do not crush or chew., Disp: 50 capsule, Rfl: 0    ARIPiprazole (Abilify) 5 mg tablet, Take 1 tablet (5 mg) by mouth once daily., Disp: 90 tablet, Rfl: 1    azithromycin (Zithromax) 250 mg tablet, Take 2 tabs (500 mg) by mouth today, than 1 daily for 4 days., Disp: 6 tablet, Rfl: 0    benzonatate (Tessalon) 200 mg capsule, Take 1 capsule (200 mg) by mouth 3 times a day as needed for cough. Do not crush or chew., Disp: 42 capsule, Rfl: 0    benzoyl peroxide (Benzac AC) 10 % external wash, Apply 1 Application topically once daily., Disp: 237 g, Rfl: 11    busPIRone (Buspar) 15 mg tablet, Take 1 tablet (15 mg) by mouth 2 times a day., Disp: 180 tablet, Rfl: 1    Clenpiq 10 mg-3.5 gram- 12 gram/175 mL solution, TAKE 1 KIT BY MOUTH 1 TIME FOR 1 DOSE., Disp: , Rfl:     clindamycin (Cleocin T) 1 % lotion, Apply topically 2 times a day., Disp: 60 mL, Rfl: 1    clonazePAM (KlonoPIN) 0.5 mg tablet, Take 1 tablet (0.5 mg) by mouth once daily as needed for anxiety., Disp: 15 tablet, Rfl: 0    Dexcom G7 Sensor device, Change every 10 days, Disp: 3 each, Rfl: 11    FLUoxetine (PROzac) 40 mg capsule, Take 1 capsule (40 mg) by mouth once daily., Disp: 90  "capsule, Rfl: 1    insulin NPH and regular human (NovoLIN 70-30 FlexPen U-100) 100 unit/mL (70-30) injection, 15 units twice a day with meals, Disp: 15 mL, Rfl: 6    Jardiance 10 mg, Take 1 tablet (10 mg) by mouth once daily., Disp: 90 tablet, Rfl: 2    losartan (Cozaar) 50 mg tablet, Take 1 tablet (50 mg) by mouth once daily., Disp: 90 tablet, Rfl: 3    metFORMIN  mg 24 hr tablet, Take 2 tablets (1,000 mg) by mouth 2 times daily (morning and late afternoon)., Disp: 360 tablet, Rfl: 3    minocycline 100 mg capsule, Take 1 capsule (100 mg) by mouth early in the morning.., Disp: , Rfl:     omeprazole (PriLOSEC) 40 mg DR capsule, Take by mouth., Disp: , Rfl:     ondansetron (Zofran) 4 mg tablet, Take 1 tablet by mouth every 8 hours as needed, Disp: 30 tablet, Rfl: 0    pen needle, diabetic (BD Ale 2nd Gen Pen Needle) 32 gauge x 5/32\" needle, Use 2 a day with insulin as directed, Disp: 200 each, Rfl: 3    predniSONE (Deltasone) 20 mg tablet, Take 1 tablet (20 mg) by mouth 2 times a day for 5 days., Disp: 10 tablet, Rfl: 0    semaglutide (Ozempic) 1 mg/dose (4 mg/3 mL) pen injector, Inject 1 mg under the skin 1 (one) time per week., Disp: 3 mL, Rfl: 6    traZODone (Desyrel) 100 mg tablet, Take 1-2 tablets (100-200 mg) by mouth as needed at bedtime for sleep., Disp: 90 tablet, Rfl: 2     Objective   Well appearing patient in no apparent distress; mood and affect are within normal limits.      Assessment/Plan   1. Hidradenitis suppurativa  Left Abdomen (side) - Lower, Left Axilla, Pubic, Right Abdomen (side) - Lower, Right Axilla  Large nflammatory papules, sinus tract formation, scarring to axilla, abdomen, and groin.  He has a family history of \"boils\" (mother) and has been getting lesions since age 19.      -Discussed nature of condition  -Discussed treatment options  -Discussed/information given on the potential adverse effects of rx Doxycycline, including but not limited to, GI upset (nausea, vomiting, " diarrhea), photosensitivity (and the need to wear SPF and avoid prolonged outdoor exposure), esophagitis (the patient is to take the medication with food & water and to remain upright for 1 hour after taking medication), and headaches.   -Doxycycline may may be taken with food to avoid GI upset; if taking with milk, multivitamins or antacids, the absorption of Doxycycline may be decreased but this is usually not an issue when treating common dermatologic conditions.  -Recommend the shortest appropriate course of oral antibiotics to reduce the chance of antibiotic resistance among bacteria.  -For patients of child-bearing potential, discussed that patient should not become pregnant while taking this medication as it can cause damage to the infant's teeth and other issues during pregnancy  -Recommend:  Discontinue smoking  He seen some improvement after a 250lb  On Ozempic and metformin for diabetes which may be helpful for his HS        Related Medications  benzoyl peroxide (Benzac AC) 10 % external wash  Apply 1 Application topically once daily.    clindamycin (Cleocin T) 1 % lotion  Apply topically 2 times a day.

## 2024-11-26 DIAGNOSIS — J20.9 ACUTE BRONCHITIS, UNSPECIFIED ORGANISM: Primary | ICD-10-CM

## 2024-11-26 RX ORDER — LEVOFLOXACIN 500 MG/1
500 TABLET, FILM COATED ORAL DAILY
Qty: 7 TABLET | Refills: 0 | Status: SHIPPED | OUTPATIENT
Start: 2024-11-26 | End: 2024-12-03

## 2024-12-05 PROCEDURE — RXMED WILLOW AMBULATORY MEDICATION CHARGE

## 2024-12-06 ENCOUNTER — PHARMACY VISIT (OUTPATIENT)
Dept: PHARMACY | Facility: CLINIC | Age: 39
End: 2024-12-06
Payer: MEDICARE

## 2024-12-06 ENCOUNTER — LAB (OUTPATIENT)
Dept: LAB | Facility: LAB | Age: 39
End: 2024-12-06
Payer: COMMERCIAL

## 2024-12-06 DIAGNOSIS — Z79.4 TYPE 2 DIABETES MELLITUS WITH HYPERGLYCEMIA, WITH LONG-TERM CURRENT USE OF INSULIN: ICD-10-CM

## 2024-12-06 DIAGNOSIS — E11.65 TYPE 2 DIABETES MELLITUS WITH HYPERGLYCEMIA, WITH LONG-TERM CURRENT USE OF INSULIN: ICD-10-CM

## 2024-12-06 LAB
ALBUMIN SERPL BCP-MCNC: 4.7 G/DL (ref 3.4–5)
ANION GAP SERPL CALC-SCNC: 17 MMOL/L (ref 10–20)
BUN SERPL-MCNC: 18 MG/DL (ref 6–23)
CALCIUM SERPL-MCNC: 10 MG/DL (ref 8.6–10.6)
CHLORIDE SERPL-SCNC: 99 MMOL/L (ref 98–107)
CO2 SERPL-SCNC: 24 MMOL/L (ref 21–32)
CREAT SERPL-MCNC: 0.68 MG/DL (ref 0.5–1.3)
EGFRCR SERPLBLD CKD-EPI 2021: >90 ML/MIN/1.73M*2
EST. AVERAGE GLUCOSE BLD GHB EST-MCNC: 214 MG/DL
GLUCOSE SERPL-MCNC: 210 MG/DL (ref 74–99)
HBA1C MFR BLD: 9.1 %
PHOSPHATE SERPL-MCNC: 3 MG/DL (ref 2.5–4.9)
POTASSIUM SERPL-SCNC: 4.7 MMOL/L (ref 3.5–5.3)
SODIUM SERPL-SCNC: 135 MMOL/L (ref 136–145)

## 2024-12-06 PROCEDURE — 83036 HEMOGLOBIN GLYCOSYLATED A1C: CPT

## 2024-12-06 PROCEDURE — 80069 RENAL FUNCTION PANEL: CPT

## 2024-12-06 PROCEDURE — 36415 COLL VENOUS BLD VENIPUNCTURE: CPT

## 2024-12-09 DIAGNOSIS — S83.271A COMPLEX TEAR OF LATERAL MENISCUS OF RIGHT KNEE AS CURRENT INJURY, INITIAL ENCOUNTER: Primary | ICD-10-CM

## 2024-12-10 ENCOUNTER — APPOINTMENT (OUTPATIENT)
Dept: ORTHOPEDIC SURGERY | Facility: CLINIC | Age: 39
End: 2024-12-10
Payer: COMMERCIAL

## 2024-12-13 RX ORDER — MELOXICAM 15 MG/1
15 TABLET ORAL DAILY PRN
Qty: 30 TABLET | Refills: 1 | Status: SHIPPED | OUTPATIENT
Start: 2024-12-13 | End: 2025-02-11

## 2024-12-23 ENCOUNTER — APPOINTMENT (OUTPATIENT)
Dept: BEHAVIORAL HEALTH | Facility: CLINIC | Age: 39
End: 2024-12-23
Payer: COMMERCIAL

## 2024-12-23 VITALS
TEMPERATURE: 98.3 F | HEART RATE: 99 BPM | SYSTOLIC BLOOD PRESSURE: 136 MMHG | DIASTOLIC BLOOD PRESSURE: 89 MMHG | BODY MASS INDEX: 39.17 KG/M2 | HEIGHT: 75 IN | WEIGHT: 315 LBS | RESPIRATION RATE: 18 BRPM

## 2024-12-23 DIAGNOSIS — F41.0 PANIC DISORDER: ICD-10-CM

## 2024-12-23 DIAGNOSIS — F90.0 ATTENTION DEFICIT HYPERACTIVITY DISORDER (ADHD), PREDOMINANTLY INATTENTIVE TYPE: ICD-10-CM

## 2024-12-23 DIAGNOSIS — F34.1 PERSISTENT DEPRESSIVE DISORDER: ICD-10-CM

## 2024-12-23 DIAGNOSIS — F41.1 GAD (GENERALIZED ANXIETY DISORDER): ICD-10-CM

## 2024-12-23 DIAGNOSIS — G47.9 SLEEP DIFFICULTIES: ICD-10-CM

## 2024-12-23 PROCEDURE — 3050F LDL-C >= 130 MG/DL: CPT

## 2024-12-23 PROCEDURE — 3075F SYST BP GE 130 - 139MM HG: CPT

## 2024-12-23 PROCEDURE — 4010F ACE/ARB THERAPY RXD/TAKEN: CPT

## 2024-12-23 PROCEDURE — 3061F NEG MICROALBUMINURIA REV: CPT

## 2024-12-23 PROCEDURE — 99214 OFFICE O/P EST MOD 30 MIN: CPT

## 2024-12-23 PROCEDURE — 3008F BODY MASS INDEX DOCD: CPT

## 2024-12-23 PROCEDURE — 3046F HEMOGLOBIN A1C LEVEL >9.0%: CPT

## 2024-12-23 PROCEDURE — 3079F DIAST BP 80-89 MM HG: CPT

## 2024-12-23 ASSESSMENT — PAIN SCALES - GENERAL: PAINLEVEL_OUTOF10: 0-NO PAIN

## 2024-12-23 NOTE — PROGRESS NOTES
Patient and provider at [facility]  Glenroy Parker is a 39 y.o. male patient with a CC ADHD, Irritable mood, Anxiety, Depression, Panic Attack, Sleeping Problem, Med Management, and Follow-up presenting to outpatient treatment for a scheduled psych outpatient psychiatric follow-up.   Pt identify self by name, , and address     2024  Reports some difficulty with depression and attention deficit symptoms on current regimen.  Associates ongoing difficulties with the season as well as the passing of his dad around this time last year.  Sleep and appetite remain unaffected.  Reports some depressive feelings while helping mom with [place] decorations, was previously done by dad who passed away last year from crippling pneumonia following lung cancer in remission.  Anxiety and panic attacks have mostly been controlled on current regimen, endorses taking 2 doses of Klonopin since last visit.  Denies substance use except for ongoing 1 PPD cigarette smoking.  Denies any noticeable side effects from medication.  Denies SI/hallucinations/delusions/maki/hypomania.  Overall feels mostly controlled.    Current S/Sx:  -Mood swings: mood swings, shops recklessly  -Depressive mood: see PHQ-9  -Fatigue/Energy: nearly daily  -Feeling hope/help/worthless: yes  -Sleep: sleeps 3-5yrs per night.   -Motivation: low  -Appetite/Weight Changes: increased appetite, gained about 30 lbs in the past 6 months  -Psychosis: denies hallucinations/delusions  -SI/HI: Denies current suicidal intent/plan  -Guns/Weapons at home: guns in the house, locked up in a safe place     -Worry excessively: present, impactful  -Difficulty controlling worry: present, impactful  -Easily fatigued d/t worry: present, impactful  -Poor concentration d/t worry: present, impactful  -Sleep disturbance d/t worry: present, impactful  -Easy irritability d/t worry: present, impactful  -Restless / feeling on edge d/t worry: present, impactful     Panic  "attacks  Onset: about a 1/5 yrs ago, duration: 20 - 30 mins, frequency: had 2 so far, associated s/sx: heart racing, shaky, sweating, fidgeting, relieving factors: unsure, time, precipitating factors: large crowds, leaving the house (since 2021) with the pandemic, last one: last year     HISTORY  PSYCH HISTORY  -Psych Hx: KAROL, MDD  -Psych Hospitalization Hx: denies  -Suicide Attempt Hx: tried to hang self as a teenager x1 (\"realized it was stupid.\")  -Self-Harm/Violence Hx: denies  -Current psych meds: Wellbutrin  mg daily, Hydroxyzine 25 mg BID as needed (causes drowsiness)  -Psych Med Hx: Wellbutrin  mg daily, Hydroxyzine 25 mg as needed, Abilify 5 mg daily (didn't feel any different), Lamotrigine 200 mg daily (didn't feel any different), Mirtazapine 7.5 mg at bedtime, Klonopin 1 mg (didn't help), Trazodone 50 mg (ineffective)     SUBSTANCE USE HISTORY  -Substance Use Hx: denies  -ETOH: occasionally  -Tobacco: about 1 ppd since 16 - 17 yrs now  -Caffeine: 2-5 cups coffee/day depending of level of fatigue  -Substance Abuse Treatment Hx: denies     FAMILY HISTORY  -Family Psych Hx: depression  -Family Suicide Hx: denies  -Family Substance Abuse Hx: dad: alcoholic     SOCIAL HISTORY  -Upbringing: Grew up with both parents. 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       PHYSICAL EXAM  Physical Exam  Psychiatric:         Attention and Perception: Attention and perception normal.         Mood and Affect: Mood and affect normal.         Speech: Speech normal.         Behavior: Behavior normal. Behavior is " cooperative.         Thought Content: Thought content normal.         Cognition and Memory: Cognition and memory normal.          IMPRESSION  ADHD, Irritable mood, Anxiety, Depression, Panic Attack, Sleeping Problem, Med Management, and Follow-up    Notes stable anxiety and and panic attacks on current regimen.  Struggles with bouts of inattentiveness and depressive symptoms relating to the season and that passing away around this time last year.  Has needed to take 2 doses of Klonopin for panic attacks, found it helpful.  Sleep and appetite remain unaffected.  Notes stable mood with manageable irritability/mood swings on current dose of Abilify.  No hallucinations/delusion/maki/hypomania/SI reported. No side effects or substance use concerns at this time except for ongoing 1 PPD cigarettes.  Discussed medications as before and follow up in 8 wks.      SI/HI ASSESSMENT  -Risk Assessment: Glenroy Parker is currently a medium chronic risk of suicide and self-harm due to past suicide attempt(s) but not currently endorsing thoughts of suicide.   -Suicidal Risk Factors: male, unmarried/single, prior suicide attempt(s), chronic medical illness, access to weapons, and panic attacks  -Protective Factors: strong coping skills, social support/connectedness, moral objections to suicide, hopefulness/future orientation, marriage/partnership, and employment  -Plan to Reduce Risk: increase coping skills .     PLAN  Reviewed diagnostic impression including subjective and objective data and provided education about Panic attacks, MDD, KAROL, bipolar disorder, and ADHD, etiology, treatment recommendations including medication, therapy, course of treatment and prognosis. Patient amenable to treatment plan.      Dx: MDD, KAROL, ADHD, r/o bipolar 2 d/o  CONTINUE Prozac 40 mg daily  CONTINUE Trazodone 100 mg; take 1 - 2 tabs at bedtime prn for sleep  CONTINUE Klonopin 0.5 mg daily prn for panic attacks (15 tabs more issued)  CONTINUE  Adderall XR 15 mg BID (2 refills issued until 11/27)   CONTINUE Abilify 5 mg daily   CONTINUE Buspirone 15 mg BID     Reviewed r/b/a, possible side effects of the medication. Client is aware about the benefit outweighs the risk.     Psychotherapy: weekly counseling with Dr. Yang Marquez since a month ago, missed 2 appts    Labs reviewed     OARRS  I have personally reviewed the OARRS report for Glenroy Velezanaly. I have considered the risks of abuse, dependence, addiction and diversion. Last filled Adderall ER 15 mg on 12/06/2024 (60 caps x 30 days), Klonopin 0.5 mg on 11/04/2024  (15 tabs x 15 days), Oxycodone 5 mg on 03/05/2024 (20 tabs x5 days), Ativan 1 mg 06/10/2024 (10 tabs x 10 days)    Last Urine Drug Screen  Date of Last Screen: 7/22/2024    Controlled Substance Agreement:  Date of the Last Agreement: CSA (benzo) 6/10/2024 , CSA (stimulants): 7/22/2024       -Follow-up with this provider in 8 weeks.    - Follow up with physical health providers as scheduled  -Risks/benefits/assessment of medication interventions discussed with pt; pt agreeable to plan. Will continue to monitor for symptoms mgmt and SEs and adjust plan as needed.  -MI to increase coping skills/behavior regulation.  -Safety plan reviewed.  -Call  Psychiatry at [phone] with issues.  -For [facility] residents, [facility] [employer] is a 24/7 hotline you can call for assistance at [phone] 188-2760. Please call 911 or go to your closest Emergency Room if you feel worse. This includes thoughts of hurting yourself or anyone else, or having other troubles such as hearing voices, seeing visions, or having new and scary thoughts about the people around you.

## 2024-12-27 ENCOUNTER — PHARMACY VISIT (OUTPATIENT)
Dept: PHARMACY | Facility: CLINIC | Age: 39
End: 2024-12-27
Payer: COMMERCIAL

## 2024-12-27 PROCEDURE — RXMED WILLOW AMBULATORY MEDICATION CHARGE

## 2025-01-03 ENCOUNTER — APPOINTMENT (OUTPATIENT)
Dept: BEHAVIORAL HEALTH | Facility: CLINIC | Age: 40
End: 2025-01-03
Payer: COMMERCIAL

## 2025-01-03 DIAGNOSIS — F41.9 ANXIETY: ICD-10-CM

## 2025-01-03 DIAGNOSIS — F34.1 PERSISTENT DEPRESSIVE DISORDER: ICD-10-CM

## 2025-01-03 PROCEDURE — 4010F ACE/ARB THERAPY RXD/TAKEN: CPT | Performed by: PSYCHOLOGIST

## 2025-01-03 PROCEDURE — 90837 PSYTX W PT 60 MINUTES: CPT | Performed by: PSYCHOLOGIST

## 2025-01-04 NOTE — PROGRESS NOTES
9 AM 15[address] Ind Psych for Persistent Depressive Dx and KAROL (60 min, [phone])  In-person. Supportive/CBT.   Patient last seen a month ago.  He reported have some enjoyment over the holidays but overall they were difficult because his father was not there.  He hosted both Thanksgiving as well as Juni events with family.  His mother is currently in the hospital with respiratory problems related to COPD.  She has been more stable and will hopefully get out over the next several days.  He wants to hurt come and stay with him for a few days while his brother fixes up his mother's home.   patient reported that his mother was in the hospital room that was only a few doors down from where his father had been hospitalized when he .  This triggered some memories. Patient reported that he wants to get some health goals on track.  His A1c has been off and this needs to be more stable in order for him to have knee surgery.  He also would like to lose some weight and we discussed him getting to the gym more consistently or to the rec center.  He found out recently that his wife is pregnant at and has some anxiety related to this.  They go to the doctor together next week.  He reported that he has been more focused on work activities and feels his performance has been better.  Is looking for opportunities within the company to transfer jobs.  He purchased a guitar over the past month and hopes to practice it consistently.  Continues to be maintained on Adderall, fluoxetine, and Abilify.  Also has antianxiety medications as needed. Next: 1 month.

## 2025-01-10 PROCEDURE — RXMED WILLOW AMBULATORY MEDICATION CHARGE

## 2025-01-13 ENCOUNTER — PHARMACY VISIT (OUTPATIENT)
Dept: PHARMACY | Facility: CLINIC | Age: 40
End: 2025-01-13
Payer: MEDICARE

## 2025-01-14 ENCOUNTER — TRANSCRIBE ORDERS (OUTPATIENT)
Dept: ORTHOPEDIC SURGERY | Facility: HOSPITAL | Age: 40
End: 2025-01-14
Payer: COMMERCIAL

## 2025-01-14 DIAGNOSIS — M54.2 NECK PAIN: ICD-10-CM

## 2025-01-17 ENCOUNTER — APPOINTMENT (OUTPATIENT)
Dept: ORTHOPEDIC SURGERY | Facility: CLINIC | Age: 40
End: 2025-01-17
Payer: COMMERCIAL

## 2025-01-17 ENCOUNTER — HOSPITAL ENCOUNTER (OUTPATIENT)
Dept: RADIOLOGY | Facility: CLINIC | Age: 40
Discharge: HOME | End: 2025-01-17
Payer: COMMERCIAL

## 2025-01-17 VITALS — WEIGHT: 315 LBS | BODY MASS INDEX: 39.17 KG/M2 | HEIGHT: 75 IN

## 2025-01-17 DIAGNOSIS — M54.2 MYOFASCIAL NECK PAIN: ICD-10-CM

## 2025-01-17 DIAGNOSIS — M54.2 NECK PAIN: ICD-10-CM

## 2025-01-17 PROCEDURE — 4010F ACE/ARB THERAPY RXD/TAKEN: CPT | Performed by: ORTHOPAEDIC SURGERY

## 2025-01-17 PROCEDURE — 99203 OFFICE O/P NEW LOW 30 MIN: CPT | Performed by: ORTHOPAEDIC SURGERY

## 2025-01-17 PROCEDURE — 72050 X-RAY EXAM NECK SPINE 4/5VWS: CPT

## 2025-01-17 PROCEDURE — 3008F BODY MASS INDEX DOCD: CPT | Performed by: ORTHOPAEDIC SURGERY

## 2025-01-17 ASSESSMENT — PAIN - FUNCTIONAL ASSESSMENT: PAIN_FUNCTIONAL_ASSESSMENT: 0-10

## 2025-01-20 NOTE — PROGRESS NOTES
HPI:Glenroy Parker is a 39-year-old man who comes in with complaints of months of neck pain and left shoulder discomfort.  He denies any specific pain going down the arm.  He does have a history of migraines.  He has seen a chiropractor for the last several months but has done no physical therapy, dry needle treatment, and/or acupuncture.      ROS:  Reviewed on EMR and patient intake sheet.    PMH/SH:   Reviewed on EMR and patient intake sheet.    Exam:  Physical Exam    Constitutional: Well appearing; no acute distress  Eyes: pupils are equal and round  Psych: normal affect  Respiratory: non-labored breathing  Cardiovascular: regular rate and rhythm  GI: non-distended abdomen  Musculoskeletal: no pain with range of motion of the shoulders bilaterally; no signs of impingement  Neurologic: [5]/5 strength in the upper extremities bilaterally]; [negative] Hollis's; [no hyper-reflexia]    Radiology:  X-rays are unremarkable.    Diagnosis:  Neck pain    Assessment and Plan:   39-year-old man with chronic neck pain which appears to be more myofascial in nature.  At this time I have recommended physical therapy with specific dry needle treatment.  He has no neurologic symptoms at this time.  I can see him back as needed.    The patient was in agreement with the plan. At the end of the visit today, the patient felt that all questions had been answered satisfactorily.  The patient was pleased with the visit and very appreciative for the care rendered.     Thank you very much for the kind referral.  It is a privilege, and a pleasure, to partner with you in the care of your patients.  I would be delighted to assist you with any further consultations as needed.      Brian Dubon MD    Chief of Spine Surgery, [facility]  Director of Spine Service, [facility]  , Department of Orthopaedics  [facility] [employer] [facility]  [facility]  [address] [place] [address]  P: [phone]  [facility]    This note was dictated with voice recognition software.  It has not been proofread for grammatical errors, typographical mistakes or other semantic inconsistencies.   Statement Selected

## 2025-02-06 ENCOUNTER — APPOINTMENT (OUTPATIENT)
Dept: ENDOCRINOLOGY | Facility: CLINIC | Age: 40
End: 2025-02-06
Payer: COMMERCIAL

## 2025-02-06 VITALS
SYSTOLIC BLOOD PRESSURE: 133 MMHG | HEART RATE: 60 BPM | DIASTOLIC BLOOD PRESSURE: 95 MMHG | BODY MASS INDEX: 39.17 KG/M2 | TEMPERATURE: 98.1 F | WEIGHT: 315 LBS | HEIGHT: 75 IN

## 2025-02-06 DIAGNOSIS — Z79.4 TYPE 2 DIABETES MELLITUS WITH HYPERGLYCEMIA, WITH LONG-TERM CURRENT USE OF INSULIN: Primary | ICD-10-CM

## 2025-02-06 DIAGNOSIS — E78.5 HYPERLIPIDEMIA, UNSPECIFIED HYPERLIPIDEMIA TYPE: ICD-10-CM

## 2025-02-06 DIAGNOSIS — E11.65 TYPE 2 DIABETES MELLITUS WITH HYPERGLYCEMIA, WITH LONG-TERM CURRENT USE OF INSULIN: Primary | ICD-10-CM

## 2025-02-06 PROCEDURE — 3008F BODY MASS INDEX DOCD: CPT | Performed by: STUDENT IN AN ORGANIZED HEALTH CARE EDUCATION/TRAINING PROGRAM

## 2025-02-06 PROCEDURE — 99214 OFFICE O/P EST MOD 30 MIN: CPT | Performed by: STUDENT IN AN ORGANIZED HEALTH CARE EDUCATION/TRAINING PROGRAM

## 2025-02-06 PROCEDURE — 3075F SYST BP GE 130 - 139MM HG: CPT | Performed by: STUDENT IN AN ORGANIZED HEALTH CARE EDUCATION/TRAINING PROGRAM

## 2025-02-06 PROCEDURE — 3080F DIAST BP >= 90 MM HG: CPT | Performed by: STUDENT IN AN ORGANIZED HEALTH CARE EDUCATION/TRAINING PROGRAM

## 2025-02-06 PROCEDURE — 4010F ACE/ARB THERAPY RXD/TAKEN: CPT | Performed by: STUDENT IN AN ORGANIZED HEALTH CARE EDUCATION/TRAINING PROGRAM

## 2025-02-06 RX ORDER — EMPAGLIFLOZIN 10 MG/1
10 TABLET, FILM COATED ORAL DAILY
Qty: 90 TABLET | Refills: 2 | Status: SHIPPED | OUTPATIENT
Start: 2025-02-06

## 2025-02-06 RX ORDER — PIOGLITAZONEHYDROCHLORIDE 15 MG/1
TABLET ORAL
Qty: 90 TABLET | Refills: 1 | Status: SHIPPED | OUTPATIENT
Start: 2025-02-06

## 2025-02-06 RX ORDER — METFORMIN HYDROCHLORIDE 500 MG/1
1000 TABLET, EXTENDED RELEASE ORAL
Qty: 360 TABLET | Refills: 3 | Status: SHIPPED | OUTPATIENT
Start: 2025-02-06 | End: 2026-02-06

## 2025-02-06 RX ORDER — GLIPIZIDE 5 MG/1
5 TABLET ORAL
Qty: 60 TABLET | Refills: 11 | Status: SHIPPED | OUTPATIENT
Start: 2025-02-06 | End: 2026-02-06

## 2025-02-06 RX ORDER — SEMAGLUTIDE 2.68 MG/ML
2 INJECTION, SOLUTION SUBCUTANEOUS
Qty: 3 ML | Refills: 11 | Status: SHIPPED | OUTPATIENT
Start: 2025-02-06

## 2025-02-06 RX ORDER — BLOOD-GLUCOSE SENSOR
EACH MISCELLANEOUS
Qty: 3 EACH | Refills: 11 | Status: SHIPPED | OUTPATIENT
Start: 2025-02-06

## 2025-02-06 NOTE — PROGRESS NOTES
36-year old male PMH: KELLY, gallstone pancreatitis, HTN, Obesity, depressionwith diabetes coming in today for DM follow up, smoker     Interval: recently got  October     Duration of type 2 diabetes: dx 2018   Complicated by neuropathy, albuminuria     Current Regimen        MTF 1g daily      Ozempic 1mg-not consistent, missing doses  Restart insulin 70/30 15 units BID-not taking   jardiance 10mg daily    Previously:   Glipizide     History of pancreatitis attributed to etoh/gallstone, seeing GI, no gallstone on work up    SMBG- has dexcom G7 but having issues with sensor currently not checking BGs     Diet-non compliant   sedentary lifestyle      Screening and Comorbidities   Last eye exam, needs this year history of DR   foot exam needs  Obesity   HLD- no statin   on Losartan      PMH: as above   Fmxh: non contributory  Social: works in a bank, rare etoh     Weight gain  12 Point ROS reviewed and is negative, pertinent findings noted on HPI    Physical Exam  Constitutional:       Appearance: Normal appearance.   Cardiovascular:      Rate and Rhythm: Normal rate.   Abdominal:      Palpations: Abdomen is soft.      Comments: Abdominal obesity   Musculoskeletal:         General: Normal range of motion.      Cervical back: Normal range of motion and neck supple.      Comments: Venous stasis dermatitis on lower extremity, trace edema   Skin:     General: Skin is warm.      Comments: No lipodystrophy   Neurological:      General: No focal deficit present.      Mental Status: He is alert and oriented to person, place, and time.         labs and imaging reviewed, pertinent findings listed on HPI and Impression    Problem List Items Addressed This Visit       Diabetes mellitus, type 2 (Multi)    Relevant Medications    Jardiance 10 mg    Dexcom G7 Sensor device    metFORMIN  mg 24 hr tablet    semaglutide (Ozempic) 2 mg/dose (8 mg/3 mL) pen injector    glipiZIDE (Glucotrol) 5 mg tablet    pioglitazone (Actos)  15 mg tablet    Other Relevant Orders    Hemoglobin A1C    Renal Function Panel    Albumin-Creatinine Ratio, Urine Random    Hyperlipidemia - Primary    Relevant Medications    pioglitazone (Actos) 15 mg tablet    Other Relevant Orders    Lipid Panel         DM2, with neuropathy,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  He is needle averse so not doing is insulin and missing multiple doses of ozempic     Surgery delayed due to high A1c   We will stop insulin since he does not want to take this and be aggressive on oral regimen instead    Dc mixed insulin  Restart ozempic to titrate to 1mg weekly  Continue jardiance   Continue metformin 1000mg BID   Start glipizide 5mg BID   Start pioglitazone 15mg daily-discussed s/e monitor for swelling    He is not on a statin, he was able to get to LDL and TG goal with lifestyle changes previously so can hold off on this for now    Labs in April     Follow up next available

## 2025-02-06 NOTE — PATIENT INSTRUCTIONS
Metformin 1000mg twice a day with meals   Jardiance 10mg daily   Titrate to increase ozempic 2mg weekly   Glipizide 5mg twice a day with meals   Pioglitazone 15mg daily     Stop insulin     Labs in April, fasting from midnight    Funmilayo Hines MD  Divison of Endocrinology   [facility]   Phone: [phone]    option 4, then option 1  Fax: [phone]

## 2025-02-07 ENCOUNTER — APPOINTMENT (OUTPATIENT)
Dept: BEHAVIORAL HEALTH | Facility: CLINIC | Age: 40
End: 2025-02-07
Payer: COMMERCIAL

## 2025-02-07 ENCOUNTER — EVALUATION (OUTPATIENT)
Dept: PHYSICAL THERAPY | Facility: CLINIC | Age: 40
End: 2025-02-07
Payer: COMMERCIAL

## 2025-02-07 DIAGNOSIS — F34.1 PERSISTENT DEPRESSIVE DISORDER: ICD-10-CM

## 2025-02-07 DIAGNOSIS — F41.1 GAD (GENERALIZED ANXIETY DISORDER): ICD-10-CM

## 2025-02-07 DIAGNOSIS — M54.2 MYOFASCIAL NECK PAIN: ICD-10-CM

## 2025-02-07 PROCEDURE — 97012 MECHANICAL TRACTION THERAPY: CPT | Mod: GP | Performed by: PHYSICAL THERAPIST

## 2025-02-07 PROCEDURE — 97161 PT EVAL LOW COMPLEX 20 MIN: CPT | Mod: GP | Performed by: PHYSICAL THERAPIST

## 2025-02-07 PROCEDURE — 4010F ACE/ARB THERAPY RXD/TAKEN: CPT | Performed by: PSYCHOLOGIST

## 2025-02-07 PROCEDURE — 90834 PSYTX W PT 45 MINUTES: CPT | Performed by: PSYCHOLOGIST

## 2025-02-07 PROCEDURE — 97110 THERAPEUTIC EXERCISES: CPT | Mod: GP | Performed by: PHYSICAL THERAPIST

## 2025-02-07 ASSESSMENT — COLUMBIA-SUICIDE SEVERITY RATING SCALE - C-SSRS
6. HAVE YOU EVER DONE ANYTHING, STARTED TO DO ANYTHING, OR PREPARED TO DO ANYTHING TO END YOUR LIFE?: NO
2. HAVE YOU ACTUALLY HAD ANY THOUGHTS OF KILLING YOURSELF?: NO
1. IN THE PAST MONTH, HAVE YOU WISHED YOU WERE DEAD OR WISHED YOU COULD GO TO SLEEP AND NOT WAKE UP?: NO

## 2025-02-07 ASSESSMENT — ENCOUNTER SYMPTOMS
DEPRESSION: 0
LOSS OF SENSATION IN FEET: 0
OCCASIONAL FEELINGS OF UNSTEADINESS: 0

## 2025-02-07 ASSESSMENT — PATIENT HEALTH QUESTIONNAIRE - PHQ9
1. LITTLE INTEREST OR PLEASURE IN DOING THINGS: NOT AT ALL
SUM OF ALL RESPONSES TO PHQ9 QUESTIONS 1 AND 2: 0
2. FEELING DOWN, DEPRESSED OR HOPELESS: NOT AT ALL

## 2025-02-07 NOTE — PROGRESS NOTES
8AM 79[address] Indiv Psych for Persistent Depressive Dx and KAROL (45 min, [phone]). In-person. Supportive/CBT. Patient doing well overall.  He reported that Tuesday was a tough day, 1 year anniversary of his father's death.  His mother continues to stay with them since she has had respiratory problems.  That is going okay.  Patient needs to get his A1c under control.  He noted that he has been eating too many carbs and has started a few new glucose medications to help control this.  Her he indicated that he needs to start monitoring his food intake more snacks.  Knee surgery is deferred until this happens.  Getting more excited about having a baby.  His wife has been having morning sickness.  He recently attended a new father's class   He found this helpful.  Going to his mother this weekend to D clutter.  He reported that he is keeping up with work demands.  Has a devoted space now which has helped him stay focused.  He reported that he is down to 1 pack of cigarettes every 2 weeks but hopes to continue to quit altogether.  Smokes now only in his vehicle. Next: 1 month.
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
## 2025-02-12 DIAGNOSIS — E11.65 TYPE 2 DIABETES MELLITUS WITH HYPERGLYCEMIA, WITH LONG-TERM CURRENT USE OF INSULIN: ICD-10-CM

## 2025-02-12 DIAGNOSIS — Z79.4 TYPE 2 DIABETES MELLITUS WITH HYPERGLYCEMIA, WITH LONG-TERM CURRENT USE OF INSULIN: ICD-10-CM

## 2025-02-12 RX ORDER — BLOOD-GLUCOSE SENSOR
EACH MISCELLANEOUS
Qty: 3 EACH | Refills: 11 | Status: SHIPPED | OUTPATIENT
Start: 2025-02-12

## 2025-02-14 RX ORDER — MELOXICAM 15 MG/1
15 TABLET ORAL DAILY PRN
COMMUNITY
Start: 2025-01-22

## 2025-02-17 ENCOUNTER — APPOINTMENT (OUTPATIENT)
Dept: BEHAVIORAL HEALTH | Facility: CLINIC | Age: 40
End: 2025-02-17
Payer: COMMERCIAL

## 2025-02-17 VITALS
DIASTOLIC BLOOD PRESSURE: 94 MMHG | SYSTOLIC BLOOD PRESSURE: 150 MMHG | HEART RATE: 89 BPM | HEIGHT: 75 IN | WEIGHT: 315 LBS | RESPIRATION RATE: 18 BRPM | TEMPERATURE: 98.7 F | BODY MASS INDEX: 39.17 KG/M2

## 2025-02-17 DIAGNOSIS — F34.1 PERSISTENT DEPRESSIVE DISORDER: ICD-10-CM

## 2025-02-17 DIAGNOSIS — Z13.39 ADHD (ATTENTION DEFICIT HYPERACTIVITY DISORDER) EVALUATION: ICD-10-CM

## 2025-02-17 DIAGNOSIS — F41.1 GAD (GENERALIZED ANXIETY DISORDER): ICD-10-CM

## 2025-02-17 DIAGNOSIS — R45.4 IRRITABILITY AND ANGER: ICD-10-CM

## 2025-02-17 PROCEDURE — 4010F ACE/ARB THERAPY RXD/TAKEN: CPT

## 2025-02-17 PROCEDURE — 3008F BODY MASS INDEX DOCD: CPT

## 2025-02-17 PROCEDURE — 3077F SYST BP >= 140 MM HG: CPT

## 2025-02-17 PROCEDURE — 3080F DIAST BP >= 90 MM HG: CPT

## 2025-02-17 PROCEDURE — 99214 OFFICE O/P EST MOD 30 MIN: CPT

## 2025-02-17 RX ORDER — FLUOXETINE HYDROCHLORIDE 20 MG/1
20 CAPSULE ORAL DAILY
Qty: 30 CAPSULE | Refills: 1 | Status: SHIPPED | OUTPATIENT
Start: 2025-02-17 | End: 2025-04-18

## 2025-02-17 RX ORDER — BUSPIRONE HYDROCHLORIDE 15 MG/1
15 TABLET ORAL 2 TIMES DAILY
Qty: 180 TABLET | Refills: 1 | Status: SHIPPED | OUTPATIENT
Start: 2025-02-17 | End: 2025-08-16

## 2025-02-17 RX ORDER — DEXTROAMPHETAMINE SACCHARATE, AMPHETAMINE ASPARTATE MONOHYDRATE, DEXTROAMPHETAMINE SULFATE AND AMPHETAMINE SULFATE 3.75; 3.75; 3.75; 3.75 MG/1; MG/1; MG/1; MG/1
15 CAPSULE, EXTENDED RELEASE ORAL
Qty: 50 CAPSULE | Refills: 0 | Status: SHIPPED | OUTPATIENT
Start: 2025-03-23 | End: 2025-04-17

## 2025-02-17 RX ORDER — ARIPIPRAZOLE 10 MG/1
10 TABLET ORAL DAILY
Qty: 30 TABLET | Refills: 0 | Status: SHIPPED | OUTPATIENT
Start: 2025-02-17 | End: 2025-03-19

## 2025-02-17 RX ORDER — DEXTROAMPHETAMINE SACCHARATE, AMPHETAMINE ASPARTATE MONOHYDRATE, DEXTROAMPHETAMINE SULFATE AND AMPHETAMINE SULFATE 3.75; 3.75; 3.75; 3.75 MG/1; MG/1; MG/1; MG/1
15 CAPSULE, EXTENDED RELEASE ORAL
Qty: 60 CAPSULE | Refills: 0 | Status: SHIPPED | OUTPATIENT
Start: 2025-02-20 | End: 2025-03-22

## 2025-02-17 RX ORDER — FLUOXETINE HYDROCHLORIDE 40 MG/1
40 CAPSULE ORAL DAILY
Qty: 90 CAPSULE | Refills: 1 | Status: SHIPPED | OUTPATIENT
Start: 2025-02-17 | End: 2025-08-16

## 2025-02-17 RX ORDER — DEXTROAMPHETAMINE SACCHARATE, AMPHETAMINE ASPARTATE MONOHYDRATE, DEXTROAMPHETAMINE SULFATE AND AMPHETAMINE SULFATE 3.75; 3.75; 3.75; 3.75 MG/1; MG/1; MG/1; MG/1
15 CAPSULE, EXTENDED RELEASE ORAL
Qty: 60 CAPSULE | Refills: 0 | Status: SHIPPED | OUTPATIENT
Start: 2025-04-18 | End: 2025-05-18

## 2025-02-17 ASSESSMENT — PATIENT HEALTH QUESTIONNAIRE - PHQ9
10. IF YOU CHECKED OFF ANY PROBLEMS, HOW DIFFICULT HAVE THESE PROBLEMS MADE IT FOR YOU TO DO YOUR WORK, TAKE CARE OF THINGS AT HOME, OR GET ALONG WITH OTHER PEOPLE: SOMEWHAT DIFFICULT
3. TROUBLE FALLING OR STAYING ASLEEP: SEVERAL DAYS
7. TROUBLE CONCENTRATING ON THINGS, SUCH AS READING THE NEWSPAPER OR WATCHING TELEVISION: NOT AT ALL
1. LITTLE INTEREST OR PLEASURE IN DOING THINGS: SEVERAL DAYS
SUM OF ALL RESPONSES TO PHQ9 QUESTIONS 1 & 2: 3
6. FEELING BAD ABOUT YOURSELF - OR THAT YOU ARE A FAILURE OR HAVE LET YOURSELF OR YOUR FAMILY DOWN: SEVERAL DAYS
8. MOVING OR SPEAKING SO SLOWLY THAT OTHER PEOPLE COULD HAVE NOTICED. OR THE OPPOSITE, BEING SO FIGETY OR RESTLESS THAT YOU HAVE BEEN MOVING AROUND A LOT MORE THAN USUAL: NOT AT ALL
SUM OF ALL RESPONSES TO PHQ QUESTIONS 1-9: 7
2. FEELING DOWN, DEPRESSED OR HOPELESS: MORE THAN HALF THE DAYS
4. FEELING TIRED OR HAVING LITTLE ENERGY: SEVERAL DAYS
5. POOR APPETITE OR OVEREATING: SEVERAL DAYS
9. THOUGHTS THAT YOU WOULD BE BETTER OFF DEAD, OR OF HURTING YOURSELF: NOT AT ALL

## 2025-02-17 ASSESSMENT — ANXIETY QUESTIONNAIRES
IF YOU CHECKED OFF ANY PROBLEMS ON THIS QUESTIONNAIRE, HOW DIFFICULT HAVE THESE PROBLEMS MADE IT FOR YOU TO DO YOUR WORK, TAKE CARE OF THINGS AT HOME, OR GET ALONG WITH OTHER PEOPLE: SOMEWHAT DIFFICULT
7. FEELING AFRAID AS IF SOMETHING AWFUL MIGHT HAPPEN: NOT AT ALL
6. BECOMING EASILY ANNOYED OR IRRITABLE: SEVERAL DAYS
5. BEING SO RESTLESS THAT IT IS HARD TO SIT STILL: NOT AT ALL
4. TROUBLE RELAXING: NOT AT ALL
1. FEELING NERVOUS, ANXIOUS, OR ON EDGE: SEVERAL DAYS
3. WORRYING TOO MUCH ABOUT DIFFERENT THINGS: SEVERAL DAYS
GAD7 TOTAL SCORE: 4
2. NOT BEING ABLE TO STOP OR CONTROL WORRYING: SEVERAL DAYS

## 2025-02-17 ASSESSMENT — PAIN SCALES - GENERAL: PAINLEVEL_OUTOF10: 0-NO PAIN

## 2025-02-17 NOTE — PROGRESS NOTES
"Glenroy Parker is a 39 y.o. male patient presenting for a in-person FUV  Visit location: patient ([place], [place] Office), provider (DARRICK Krishna, virtual office)  Pt identify self by name, , and address     Chief Complaint   Patient presents with    ADHD    Anxiety    Depression    Panic Attack    Sleeping Problem    Follow-up    Med Management    OTHER     Anger/irritability     HPI    2025  Reports increased depression lately which has been primarily provoked by mom suffering with pneumonia.  Reports father had lung cancer, completed treatment and went into remission, but contracted pneumonia as a result of treatment, eventually  from a pneumonia.  States, \"months pneumonia reminds me of my dad which is what making me feel depressed.\"  Reports other stressors including a change in insurance causing increased deductible for Adderall.  States he was requested to pay $260 for 30-day supply of his Adderall, wondering if dose and alternative.  States anxiety has also been uncontrolled, but denies panic attacks.  Has not needed to take Klonopin much.  Continue to take oral medications as before.  He reports worsening mood swings, primarily anger/irritability.  States sleep and appetite have remained unchanged since last visit, but trying to get healthier as his wife is pregnant. Denies substance use except for ongoing 1 PPD cigarette smoking.  Denies any noticeable side effects from medication.  Denies SI/hallucinations/delusions/maki/hypomania.     Current S/Sx:  -Mood swings: mood swings, ongoing anger/irritability  -Depressive mood: see PHQ-9 (7)  -Fatigue/Energy: nearly daily  -Feeling hope/help/worthless: yes  -Sleep: sleeps 3-5yrs per night.   -Motivation: low  -Appetite/Weight Changes: increased appetite, gained about 30 lbs in the past 6 months  -Psychosis: denies hallucinations/delusions  -SI/HI: Denies current suicidal intent/plan  -Guns/Weapons at home: guns in the house, " "locked up in a safe place     -Worry excessively: Mostly managed  -PHQ 7 (4)     Panic attacks  Denies recent intense panic attacks     HISTORY  PSYCH HISTORY  -Psych Hx: KAROL, MDD  -Psych Hospitalization Hx: denies  -Suicide Attempt Hx: tried to hang self as a teenager x1 (\"realized it was stupid.\")  -Self-Harm/Violence Hx: denies  -Current psych meds: Wellbutrin  mg daily, Hydroxyzine 25 mg BID as needed (causes drowsiness)  -Psych Med Hx: Wellbutrin  mg daily, Hydroxyzine 25 mg as needed, Abilify 5 mg daily (didn't feel any different), Lamotrigine 200 mg daily (didn't feel any different), Mirtazapine 7.5 mg at bedtime, Klonopin 1 mg (didn't help), Trazodone 50 mg (ineffective)     SUBSTANCE USE HISTORY  -Substance Use Hx: denies  -ETOH: occasionally  -Tobacco: about 1 ppd since 16 - 17 yrs now  -Caffeine: 2-5 cups coffee/day depending of level of fatigue  -Substance Abuse Treatment Hx: denies     FAMILY HISTORY  -Family Psych Hx: depression  -Family Suicide Hx: denies  -Family Substance Abuse Hx: dad: alcoholic     SOCIAL HISTORY  -Upbringing: Grew up with both parents. 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       PHYSICAL EXAM  Physical Exam  Psychiatric:         Attention and Perception: Attention and perception normal.         Mood and Affect: Mood and affect normal.         Speech: Speech normal.         Behavior: Behavior normal. Behavior is cooperative.         Thought Content: Thought content normal.         Cognition and Memory: Cognition and memory normal.          IMPRESSION  ADHD, " Anxiety, Depression, Panic Attack, Sleeping Problem, Follow-up, Med Management, and OTHER (Anger/irritability)    Reports recent increase in his depression and irritable mood given current comorbid state of mom.  Indicates his dad  of pneumonia following remission after lung cancer treatment, and mom just recently contracted pneumonia with hospitalization.  Mom currently lives with him and his wife.  Other stressors include his wife being pregnant and having to pay high deductible for Adderall.  Reports less frequent/intense panic attacks since last visit.  Reports mild mood swings.  No hallucinations/delusion/maki/hypomania/SI reported. No side effects or substance use concerns at this time except for ongoing 1 PPD cigarettes.  Discussed to increase Prozac to more effectively manage depressive symptoms, increase Abilify for anger/irritability.  Good Rx coupon provided to patient to decrease cost of Adderall, if medication remains expensive, will consider switching patient to Concerta to lessen cost.  Discussed medications as before and follow up in 6 wks.      SI/HI ASSESSMENT  -Risk Assessment: Glenroy Parker is currently a medium chronic risk of suicide and self-harm due to past suicide attempt(s) but not currently endorsing thoughts of suicide.   -Suicidal Risk Factors: male, unmarried/single, prior suicide attempt(s), chronic medical illness, access to weapons, and panic attacks  -Protective Factors: strong coping skills, social support/connectedness, moral objections to suicide, hopefulness/future orientation, marriage/partnership, and employment  -Plan to Reduce Risk: increase coping skills .     PLAN  Reviewed diagnostic impression including subjective and objective data and provided education about Panic attacks, MDD, KAROL, bipolar disorder, and ADHD, etiology, treatment recommendations including medication, therapy, course of treatment and prognosis. Patient amenable to treatment plan.      Dx: MDD,  KAROL, ADHD, r/o bipolar 2 d/o  INCREASE Prozac 60 mg daily (40 + 20 mg)  CONTINUE Trazodone 100 mg; take 1 - 2 tabs at bedtime prn for sleep  CONTINUE Klonopin 0.5 mg daily prn for panic attacks (15 tabs more issued)  CONTINUE Adderall XR 15 mg BID (2 refills issued until 05/18) - try goodrx coupon (may switch to Concerta 27 mg if neccessary)  INCREASE Abilify 10 mg daily   CONTINUE Buspirone 15 mg BID     Reviewed r/b/a, possible side effects of the medication. Client is aware about the benefit outweighs the risk.     Psychotherapy: weekly counseling with Dr. Yang Marquez since a month ago, missed 2 appts    Labs reviewed     OARRS  I have personally reviewed the OARRS report for Glenroy Parker. I have considered the risks of abuse, dependence, addiction and diversion. Last filled Adderall ER 15 mg on 01/13/2025 (60 caps x 30 days), Klonopin 0.5 mg on 11/04/2024  (15 tabs x 15 days), Oxycodone 5 mg on 03/05/2024 (20 tabs x5 days), Ativan 1 mg 06/10/2024 (10 tabs x 10 days)    Last Urine Drug Screen  Date of Last Screen: 7/22/2024    Controlled Substance Agreement:  Date of the Last Agreement: CSA (benzo) 6/10/2024 , CSA (stimulants): 7/22/2024       -Follow-up with this provider in 6 weeks.    -Total time: 30 minutes in-person FUV    - Follow up with physical health providers as scheduled  -Risks/benefits/assessment of medication interventions discussed with pt; pt agreeable to plan. Will continue to monitor for symptoms mgmt and SEs and adjust plan as needed.  -MI to increase coping skills/behavior regulation.  -Safety plan reviewed.  -Call  Psychiatry at [phone] with issues.  -For [facility] residents, [employer] is a 24/7 hotline you can call for assistance at [phone]. Please call 911 or go to your closest Emergency Room if you feel worse. This includes thoughts of hurting yourself or anyone else, or having other troubles such as hearing voices, seeing visions, or having new and scary  thoughts about the people around you.

## 2025-02-18 DIAGNOSIS — S83.271A COMPLEX TEAR OF LATERAL MENISCUS OF RIGHT KNEE AS CURRENT INJURY, INITIAL ENCOUNTER: ICD-10-CM

## 2025-02-18 RX ORDER — MELOXICAM 15 MG/1
15 TABLET ORAL DAILY PRN
Qty: 30 TABLET | Refills: 1 | Status: SHIPPED | OUTPATIENT
Start: 2025-02-18 | End: 2025-04-19

## 2025-02-19 ENCOUNTER — TREATMENT (OUTPATIENT)
Dept: PHYSICAL THERAPY | Facility: CLINIC | Age: 40
End: 2025-02-19
Payer: COMMERCIAL

## 2025-02-19 DIAGNOSIS — M54.2 MYOFASCIAL NECK PAIN: Primary | ICD-10-CM

## 2025-02-19 PROCEDURE — 97110 THERAPEUTIC EXERCISES: CPT | Mod: GP,CQ

## 2025-02-19 NOTE — PROGRESS NOTES
PHYSICAL THERAPY   TREATMENT NOTE    Patient Name:  Glenroy Parker   Patient MRN: [id number]  Date: 2/19/2025    Time Calculation  Start Time: 1000  Stop Time: 1045  Time Calculation (min): 45 min    Insurance:  Insurance Type: [place]/Highmark  Visit number: 2  [place] of visits [place]  Authorization Needed: No    General   Reason for visit: neck pain   Referred by: Ling    Therapy diagnoses:   Problem List Items Addressed This Visit             ICD-10-CM    Myofascial neck pain - Primary M54.2     Assessment:    Patient presented to session with mild to moderate pain in the L CS region. Reviewed HEP in its entirety with good knowledge and understanding moving forward. Progressed with resisted theraband mid rows and B shoulder extension to increase strength with verbal cues given for postural awareness to prevent B UT elevation. Able to add prone scapular retraction with shoulder extension displaying mild muscular fatigue. Updated HEP.    Plan:  Continue to address UE/scapular strength and CS flexibility as tolerated.    Subjective  Patient reports increased pain in the L side of the neck; sleep is disturbed; has tingling down the L arm still. Has done some of the HEP. Going to chiropractor 1x a week.  - Pain (0-10): 4-5/10 L side CS; at night last night it was at a 9/10.     Precautions  PMH: KELLY, gallstone pancreatitis, HTN, Obesity, depression, DM with neuropathy, smoker, herniated disc in lumbar spine, R meniscus tear  Fall risk -  none    Objective    Treatment Performed:   Therapeutic Exercise:    - Access Code: [email or web address]  - UBE: L3 3' fwd/3' retro  - Seated Cervical Retraction 10 x 10 sec hold  - Seated Scapular Retraction 10 x 10 sec hold  - Gentle Levator Scapulae Stretch 3 x 30 sec hold  - Mid-rows 2 x 15 blue  - B shoulder ext 2 x 15 blue  - Seated Thoracic Lumbar Extension with Pectoralis Stretch 10 x 5 sec hold  - Prone Scapular Retraction with shoulder ext  x 15 3 sec hold  - Prone Scapular Retraction  Arms at Side  x 15  3 sec hold  - Prone Scapular Retraction Y  x 15 3 sec hold    Manual Therapy:     Neuromuscular Re-education:     Gait Training:      Modalities: minutes       PT Therapeutic Procedures Time Entry  Therapeutic Exercise Time Entry: 45

## 2025-02-26 ENCOUNTER — TREATMENT (OUTPATIENT)
Dept: PHYSICAL THERAPY | Facility: CLINIC | Age: 40
End: 2025-02-26
Payer: COMMERCIAL

## 2025-02-26 DIAGNOSIS — M54.2 MYOFASCIAL NECK PAIN: Primary | ICD-10-CM

## 2025-02-26 PROCEDURE — 97110 THERAPEUTIC EXERCISES: CPT | Mod: GP,CQ

## 2025-02-26 PROCEDURE — 97012 MECHANICAL TRACTION THERAPY: CPT | Mod: GP,CQ

## 2025-02-26 NOTE — PROGRESS NOTES
PHYSICAL THERAPY   TREATMENT NOTE    Patient Name:  Glenroy Parker   Patient MRN: [id number]  Date: 2/26/2025    Time Calculation  Start Time: 1300  Stop Time: 1345  Time Calculation (min): 45 min    Insurance:  Insurance Type: [place]/Highmark  Visit number: 3  [place] of visits [place]  Authorization Needed: No    General   Reason for visit: neck pain   Referred by: Ling    Therapy diagnoses:   Problem List Items Addressed This Visit             ICD-10-CM    Myofascial neck pain - Primary M54.2     Assessment:    Patient presented to session with moderate pain in the L CS region along with median nerve distribution radicular symptoms in the L UE into the hand/fingertips. Reviewed proper levator scap stretching with a mild increase of intensity of radicular symptoms in the hand. Initiated cervical mechanical traction with expression of less L hand symptoms during and after treatment session; no adverse reactions expressed post treatment.    Plan:  Continue to address UE/scapular strength and CS flexibility as tolerated.    Subjective  Patient reports he isn't as Scientologist as he should be with his HEP. Still has tingling in his thumb and first two fingers  Going to chiropractor 1x a week.  - Pain (0-10): 5/10 L side CS; at night it was at a 8-9/10.     Precautions  PMH: KELLY, gallstone pancreatitis, HTN, Obesity, depression, DM with neuropathy, smoker, herniated disc in lumbar spine, R meniscus tear  Fall risk -  none    Objective  Access Code: [email or web address] ( cervical retraction, scap retraction, lev scap stretch, thoracic ext pec stretch, prone I T & Y )    Treatment Performed:   Therapeutic Exercise:    - Access Code: [email or web address]  - UBE: L3 3' fwd/3' retro  - Gentle Levator Scapulae Stretch 3 x 30 sec hold  - Mid-rows 2 x 15 blue  - B shoulder ext 2 x 15 blue  - Seated Thoracic Lumbar Extension with Pectoralis Stretch 10 x 5 sec hold    Manual Therapy:     Neuromuscular Re-education:     Gait Training:      Modalities:  minutes  Cervical mechanical traction 25# 45 sec on 15 sec off x 12 minutes         PT Therapeutic Procedures Time Entry  Therapeutic Exercise Time Entry: 33  PT Modalities Time Entry  Mechanical Traction Time Entry: 12

## 2025-03-04 DIAGNOSIS — E11.65 TYPE 2 DIABETES MELLITUS WITH HYPERGLYCEMIA, WITH LONG-TERM CURRENT USE OF INSULIN: ICD-10-CM

## 2025-03-04 DIAGNOSIS — Z79.4 TYPE 2 DIABETES MELLITUS WITH HYPERGLYCEMIA, WITH LONG-TERM CURRENT USE OF INSULIN: ICD-10-CM

## 2025-03-04 RX ORDER — BLOOD-GLUCOSE SENSOR
EACH MISCELLANEOUS
Qty: 3 EACH | Refills: 11 | Status: SHIPPED | OUTPATIENT
Start: 2025-03-04

## 2025-03-04 RX ORDER — BLOOD-GLUCOSE SENSOR
EACH MISCELLANEOUS
Refills: 11 | OUTPATIENT
Start: 2025-03-04

## 2025-03-05 ENCOUNTER — TREATMENT (OUTPATIENT)
Dept: PHYSICAL THERAPY | Facility: CLINIC | Age: 40
End: 2025-03-05
Payer: COMMERCIAL

## 2025-03-05 ENCOUNTER — OFFICE VISIT (OUTPATIENT)
Dept: PRIMARY CARE | Facility: CLINIC | Age: 40
End: 2025-03-05
Payer: COMMERCIAL

## 2025-03-05 VITALS
WEIGHT: 315 LBS | OXYGEN SATURATION: 97 % | HEIGHT: 75 IN | SYSTOLIC BLOOD PRESSURE: 124 MMHG | HEART RATE: 82 BPM | BODY MASS INDEX: 39.17 KG/M2 | DIASTOLIC BLOOD PRESSURE: 86 MMHG

## 2025-03-05 DIAGNOSIS — M51.26 HERNIATED NUCLEUS PULPOSUS, L4-5 LEFT: Primary | ICD-10-CM

## 2025-03-05 DIAGNOSIS — Z00.00 ROUTINE GENERAL MEDICAL EXAMINATION AT A HEALTH CARE FACILITY: ICD-10-CM

## 2025-03-05 DIAGNOSIS — M54.2 MYOFASCIAL NECK PAIN: ICD-10-CM

## 2025-03-05 PROCEDURE — 3008F BODY MASS INDEX DOCD: CPT | Performed by: STUDENT IN AN ORGANIZED HEALTH CARE EDUCATION/TRAINING PROGRAM

## 2025-03-05 PROCEDURE — 97012 MECHANICAL TRACTION THERAPY: CPT | Mod: GP | Performed by: PHYSICAL THERAPIST

## 2025-03-05 PROCEDURE — 97110 THERAPEUTIC EXERCISES: CPT | Mod: GP | Performed by: PHYSICAL THERAPIST

## 2025-03-05 PROCEDURE — 3074F SYST BP LT 130 MM HG: CPT | Performed by: STUDENT IN AN ORGANIZED HEALTH CARE EDUCATION/TRAINING PROGRAM

## 2025-03-05 PROCEDURE — 99395 PREV VISIT EST AGE 18-39: CPT | Performed by: STUDENT IN AN ORGANIZED HEALTH CARE EDUCATION/TRAINING PROGRAM

## 2025-03-05 PROCEDURE — 4010F ACE/ARB THERAPY RXD/TAKEN: CPT | Performed by: STUDENT IN AN ORGANIZED HEALTH CARE EDUCATION/TRAINING PROGRAM

## 2025-03-05 PROCEDURE — 3079F DIAST BP 80-89 MM HG: CPT | Performed by: STUDENT IN AN ORGANIZED HEALTH CARE EDUCATION/TRAINING PROGRAM

## 2025-03-05 PROCEDURE — 97140 MANUAL THERAPY 1/> REGIONS: CPT | Mod: GP | Performed by: PHYSICAL THERAPIST

## 2025-03-05 RX ORDER — CELECOXIB 200 MG/1
200 CAPSULE ORAL 2 TIMES DAILY
Qty: 60 CAPSULE | Refills: 5 | Status: SHIPPED | OUTPATIENT
Start: 2025-03-05 | End: 2025-09-01

## 2025-03-05 ASSESSMENT — PATIENT HEALTH QUESTIONNAIRE - PHQ9
SUM OF ALL RESPONSES TO PHQ9 QUESTIONS 1 AND 2: 0
1. LITTLE INTEREST OR PLEASURE IN DOING THINGS: NOT AT ALL
2. FEELING DOWN, DEPRESSED OR HOPELESS: NOT AT ALL

## 2025-03-05 ASSESSMENT — ENCOUNTER SYMPTOMS: DEPRESSION: 0

## 2025-03-05 NOTE — PROGRESS NOTES
"Subjective   Patient ID: Glenroy Parker is a 39 y.o. male who presents for Establish Care, Pain, and Annual Exam (fasting).    HPI     DM: poor contorleld follows endo, has obesity, and on ozempic, poor diet, needs to lose weight fo fred surgyer given meniscual tear    Anxiety: on prozac stable no issues    Doing well discussed weight and diet changes  Having a kid soon      Review of Systems   All other systems reviewed and are negative.      Objective   /86 (BP Location: Left arm, Patient Position: Sitting, BP Cuff Size: Large adult)   Pulse 82   Ht 1.905 m (6' 3\")   Wt 147 kg (323 lb)   SpO2 97%   BMI 40.37 kg/m²     Physical Exam  Constitutional:       Appearance: Normal appearance.   HENT:      Head: Normocephalic and atraumatic.      Right Ear: Tympanic membrane and ear canal normal.      Left Ear: Tympanic membrane and ear canal normal.      Mouth/Throat:      Mouth: Mucous membranes are moist.      Pharynx: Oropharynx is clear.   Eyes:      Extraocular Movements: Extraocular movements intact.      Conjunctiva/sclera: Conjunctivae normal.      Pupils: Pupils are equal, round, and reactive to light.   Cardiovascular:      Rate and Rhythm: Normal rate and regular rhythm.      Pulses: Normal pulses.      Heart sounds: Normal heart sounds.   Pulmonary:      Effort: Pulmonary effort is normal.      Breath sounds: Normal breath sounds.   Abdominal:      General: Abdomen is flat. Bowel sounds are normal.      Palpations: Abdomen is soft.   Musculoskeletal:         General: Normal range of motion.      Cervical back: Normal range of motion and neck supple.   Skin:     General: Skin is warm and dry.      Capillary Refill: Capillary refill takes 2 to 3 seconds.   Neurological:      General: No focal deficit present.      Mental Status: He is alert and oriented to person, place, and time. Mental status is at baseline.   Psychiatric:         Mood and Affect: Mood normal.         Behavior: Behavior normal.    "      Thought Content: Thought content normal.         Judgment: Judgment normal.       Assessment/Plan   1. Herniated nucleus pulposus, L4-5 left (Primary)    - celecoxib (CeleBREX) 200 mg capsule; Take 1 capsule (200 mg) by mouth 2 times a day.  Dispense: 60 capsule; Refill: 5      2. Routine general medical examination at a health care facility  Labs reveiwed stable  No issues
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
## 2025-03-06 ENCOUNTER — APPOINTMENT (OUTPATIENT)
Dept: PRIMARY CARE | Facility: CLINIC | Age: 40
End: 2025-03-06
Payer: COMMERCIAL

## 2025-03-07 ENCOUNTER — APPOINTMENT (OUTPATIENT)
Dept: BEHAVIORAL HEALTH | Facility: CLINIC | Age: 40
End: 2025-03-07
Payer: COMMERCIAL

## 2025-03-07 DIAGNOSIS — F34.1 PERSISTENT DEPRESSIVE DISORDER: ICD-10-CM

## 2025-03-07 DIAGNOSIS — F41.1 GAD (GENERALIZED ANXIETY DISORDER): ICD-10-CM

## 2025-03-07 PROCEDURE — 90837 PSYTX W PT 60 MINUTES: CPT | Performed by: PSYCHOLOGIST

## 2025-03-07 PROCEDURE — 4010F ACE/ARB THERAPY RXD/TAKEN: CPT | Performed by: PSYCHOLOGIST

## 2025-03-08 NOTE — PROGRESS NOTES
Dictated with Dragon One medical software:  10 AM [id number] Ind[facility] Psych for Persistent Depressive Dx and KAROL (60 min, [phone]). In-person. Supportive/CBT.  patient's wife had ultrasound and they are expecting a baby boy around . the plan the name of their son Glenroy Gonzalez.  Patient has not smoked cigarettes in the past 2 months and is feeling good about this progress.  Still using a nicotine vape but plans to get off of that as well.  He is trying to make some adjustments with his eating and health they will have his next A1c test in April.  He has been cutting back on carbs and hopes to get his health stable for knee surgery.  Has been having some physical therapy for his neck.  His mother is still living with them for the next few weeks.  He reported that he has been depressed, primarily because his mother brings up his  father which is a trigger for him being sad.  He is off probation at work.  Some frustration with his brother who lives with his mother.  Patient and his wife will be going to [place] for their honeymoon in  and he is looking forward to that. Next: 1 month.

## 2025-03-11 DIAGNOSIS — R45.4 IRRITABILITY AND ANGER: ICD-10-CM

## 2025-03-11 DIAGNOSIS — F34.1 PERSISTENT DEPRESSIVE DISORDER: ICD-10-CM

## 2025-03-11 RX ORDER — ARIPIPRAZOLE 10 MG/1
10 TABLET ORAL DAILY
Qty: 90 TABLET | Refills: 1 | Status: SHIPPED | OUTPATIENT
Start: 2025-03-11

## 2025-03-11 RX ORDER — FLUOXETINE HYDROCHLORIDE 20 MG/1
20 CAPSULE ORAL DAILY
Qty: 90 CAPSULE | Refills: 1 | Status: SHIPPED | OUTPATIENT
Start: 2025-03-11

## 2025-03-12 ENCOUNTER — TREATMENT (OUTPATIENT)
Dept: PHYSICAL THERAPY | Facility: CLINIC | Age: 40
End: 2025-03-12
Payer: COMMERCIAL

## 2025-03-12 DIAGNOSIS — M54.2 MYOFASCIAL NECK PAIN: ICD-10-CM

## 2025-03-12 PROCEDURE — 97110 THERAPEUTIC EXERCISES: CPT | Mod: GP | Performed by: PHYSICAL THERAPIST

## 2025-03-12 PROCEDURE — 97140 MANUAL THERAPY 1/> REGIONS: CPT | Mod: GP | Performed by: PHYSICAL THERAPIST

## 2025-03-12 PROCEDURE — 97012 MECHANICAL TRACTION THERAPY: CPT | Mod: GP | Performed by: PHYSICAL THERAPIST

## 2025-03-12 NOTE — PROGRESS NOTES
"PHYSICAL THERAPY   TREATMENT NOTE    Patient Name:  Glenroy Parker   Patient MRN: [id number]  Date: 3/12/2025    Time Calculation  Start Time: 1145  Stop Time: 1230  Time Calculation (min): 45 min    Insurance:  Insurance Type: [place]/[employer]  Visit number: 5  [place] of visits [place]  Authorization Needed: No    General   Reason for visit: neck pain   Referred by: Ling Kevin diagnoses:   Problem List Items Addressed This Visit             ICD-10-CM    Myofascial neck pain M54.2     Assessment:    Pt has positive response to manual work last visit therefore continued this today.  Pt also has decreased tingling to left hand following soft tissue work today.  Seems to be progressing well.     Plan:  Continue to focus on cervical stretching, midback strengthening, manual therapy, traction.      Subjective  Pt states his neck is actually feeling better.  Feels like the massage and traction have helped out a lot.  Not as much pain sitting at his work.  Still having tingling in 3 fingers left hand but does seem to decrease following traction.    - Pain (0-10): 2/10 L neck pain, tingling in fingers 1-3    Precautions  PMH: KELLY, gallstone pancreatitis, HTN, Obesity, depression, DM with neuropathy, smoker, herniated disc in lumbar spine, R meniscus tear  Fall risk -  none    Objective  Access Code: [email or web address] (cervical retraction, scap retraction, lev scap stretch, thoracic ext pec stretch, prone I T & Y )  Treatment Performed:   Therapeutic Exercise:    - UBE: L4 x 3' for / 3' rev hills   - Door stretch 30\" x 2  - prone I's, T's, Y's x 15     Manual Therapy  STM/DTM L cervical paraspinals, SCM, UT, supraspinatus, LS    Modalities:   Cervical mechanical traction 25# 45 sec on 15 sec off x 12 minutes         PT Therapeutic Procedures Time Entry  Manual Therapy Time Entry: 18  Therapeutic Exercise Time Entry: 15  PT Modalities Time Entry  Mechanical Traction Time Entry: 12              "

## 2025-03-19 ENCOUNTER — TREATMENT (OUTPATIENT)
Dept: PHYSICAL THERAPY | Facility: CLINIC | Age: 40
End: 2025-03-19
Payer: COMMERCIAL

## 2025-03-19 DIAGNOSIS — M54.2 MYOFASCIAL NECK PAIN: ICD-10-CM

## 2025-03-19 PROCEDURE — 97012 MECHANICAL TRACTION THERAPY: CPT | Mod: GP | Performed by: PHYSICAL THERAPIST

## 2025-03-19 PROCEDURE — 97110 THERAPEUTIC EXERCISES: CPT | Mod: GP | Performed by: PHYSICAL THERAPIST

## 2025-03-19 NOTE — PROGRESS NOTES
"PHYSICAL THERAPY   TREATMENT NOTE    Patient Name:  Glenroy Parker   Patient MRN: [id number]  Date: 3/19/2025    Time Calculation  Start Time: 1150  Stop Time: 1235  Time Calculation (min): 45 min    Insurance:  Insurance Type: [place]/[employer]  Visit number: 6  [place] of visits [place]  Authorization Needed: No    General   Reason for visit: neck pain   Referred by: Ling    Therapy diagnoses:   Problem List Items Addressed This Visit             ICD-10-CM       Musculoskeletal and Injuries    Myofascial neck pain M54.2     Assessment:    Pt has been progressing and tolerated midback strengthening exercises well. Educated on posture correction during work and requires verbal and tactile cues during strengthening exercises to maintain proper form (keep shoulders down and squeeze pen between scapulae). Would benefit from continued focus on postural correction.     Plan:  Continue to focus on postural correction, cervical stretching, midback strengthening, and traction. Continue manual therapy prn.      Subjective  Pt reports that he had neck pain on Monday night due to sitting for work then decreased Tuesday. Neck pain is worse this morning. Still believes that he is overall improving. Puts on pain cream and stretches when pain increases which helps. Pt had decreased neck pain (2/10) post- exercises and traction  - Pain (0-10): 5/10 L neck pain, tingling in fingers 1-2    Precautions  PMH: KELLY, gallstone pancreatitis, HTN, Obesity, depression, DM with neuropathy, smoker, herniated disc in lumbar spine, R meniscus tear  Fall risk -  none    Objective  Access Code: [email or web address] (cervical retraction, scap retraction, lev scap stretch, thoracic ext pec stretch, prone I T & Y )  Treatment Performed:   Therapeutic Exercise:    - UBE: L4 x 3' for / 3' rev hills   - R/L UT stretch 30\"  - R/L LS stretch 30\"  - Door stretch mid level 30\" x 2  - Rows 2 x 10 blue band on bar  - B Shoulder extension 2 x 10 blue on bar (cue to keep " shoulders down)  - B shoulder ER w/ neutral spine against wall (towel roll) blue band 3 x 10    Modalities:   Cervical mechanical traction intermittent 6-25# 45 sec on 15 sec off x 12 minutes    Patient was seen under the guidance of a licensed physical therapist who made all clinical decisions.   Rosa Coulter, SPT         PT Therapeutic Procedures Time Entry  Therapeutic Exercise Time Entry: 30  PT Modalities Time Entry  Mechanical Traction Time Entry: 15            '

## 2025-03-26 ENCOUNTER — TREATMENT (OUTPATIENT)
Dept: PHYSICAL THERAPY | Facility: CLINIC | Age: 40
End: 2025-03-26
Payer: COMMERCIAL

## 2025-03-26 DIAGNOSIS — M54.2 MYOFASCIAL NECK PAIN: ICD-10-CM

## 2025-03-26 PROCEDURE — 97140 MANUAL THERAPY 1/> REGIONS: CPT | Mod: GP | Performed by: PHYSICAL THERAPIST

## 2025-03-26 PROCEDURE — 97110 THERAPEUTIC EXERCISES: CPT | Mod: GP | Performed by: PHYSICAL THERAPIST

## 2025-03-26 NOTE — PROGRESS NOTES
PHYSICAL THERAPY   TREATMENT NOTE/RECHECK    Patient Name:  Glenroy Parker   Patient MRN: [id number]  Date: 3/26/2025    Time Calculation  Start Time: 1017  Stop Time: 1057  Time Calculation (min): 40 min    Insurance:  Insurance Type: [place]/[employer]  Visit number: 7  [place]ved [place] of visits [place]  Authorization Needed: No  Last recheck 3/26/25    General   Reason for visit: neck pain L  Referred by: Ling    Therapy diagnoses:   Problem List Items Addressed This Visit             ICD-10-CM    Myofascial neck pain M54.2     Assessment:    Pt is demonstrating some progress towards his goals with improved reports of pain and tingling and noted improvements with postural habits and desk set up at work. He continues to have some episodes of significant neck pain and does continue to demonstrate decreased midback strength with poor postural habits and significant thoracic kyphosis and would continue to benefit from skilled PT to address these deficits, particularly focusing on midback strength for improved postural endurance, as well as manual techniques and traction to increase soft tissue mobility to achieve proper posture.    Plan:  Assess response to PT today without traction. Emphasize compliance with midback strengthening and postural check ins at home. Continue manual to address soft tissue mobility for improved posture.  Goals  1. Pt will be independent in HEP in 6-8 weeks - PROGRESSING  2. Pt will report 0-4/10 neck pain at rest and with activity in 6-8 weeks. - PROGRESSING  3. Pt will demonstrate 5/5 B midback strength to improve upright posture in 6-8 weeks - PROGRESSING  4. Pt will demonstrate full and painfree cervical ROM to improve driving tolerance in 6-8 weeks. - PROGRESSING  5. Pt will report NDI score < 20% in 6-8 weeks. - PROGRESSING      Subjective  Last night had a rough night where he was waking up a lot but not sure what he did. Did try to raise his work monitors which helped a bit. The pain went away  "for a few days after last session. Feels he is 80% back to normal. Driving feels better most days.  Is only waking him a few times a week. Feels he is 80% back to normal.   - Pain (0-10): 3/10 L neck pain, tingling in fingers 1-2. Tingling does come and go. Was a 9/10 last night. Worse at end of day. Does have some moments where he is 0/10.    Precautions  PMH: KELLY, gallstone pancreatitis, HTN, Obesity, depression, DM with neuropathy, smoker, herniated disc in lumbar spine, R meniscus tear  Fall risk -  none    Objective IE // TODAY  Access Code: [email or web address] (cervical retraction, scap retraction, lev scap stretch, thoracic ext pec stretch, prone I T & Y, rows blue, ER blue, shoulder ext blue, Standing Y blue)  Range of Motion (R, L in degrees)  Cervical Flexion 64*  // 62  Cervical Extension 28 // 40*  Cervical Sidebending 32*, 42* (stretching pain) // 30*, 40  Cervical Rotation WNL, WNL* (inc L) // WNL, WNL*     Strength (R, L MMT out of 5)  Mid Trap 4+, 4+ // 5, 5  Upper Trap 4, 4 // 4, 4     Outcome Measures  NDI = 52% // 30%    Treatment Performed:   Therapeutic Exercise:    - UBE: L3 x 3' for / 3' rev hills   - doorway pec stretch x 30\" mid/high  - recheck  - B standing Y blue x 10 x 2    Manual Therapy:  STM/DTM B cervical paraspinals, L UT, LS, supraspinatus  B UT stretch  Suboccipital release       PT Therapeutic Procedures Time Entry  Manual Therapy Time Entry: 15  Therapeutic Exercise Time Entry: 25               '  "

## 2025-03-31 ENCOUNTER — APPOINTMENT (OUTPATIENT)
Dept: BEHAVIORAL HEALTH | Facility: CLINIC | Age: 40
End: 2025-03-31
Payer: COMMERCIAL

## 2025-03-31 DIAGNOSIS — F90.0 ATTENTION DEFICIT HYPERACTIVITY DISORDER (ADHD), PREDOMINANTLY INATTENTIVE TYPE: ICD-10-CM

## 2025-03-31 DIAGNOSIS — F34.1 PERSISTENT DEPRESSIVE DISORDER: ICD-10-CM

## 2025-03-31 DIAGNOSIS — F41.1 GAD (GENERALIZED ANXIETY DISORDER): ICD-10-CM

## 2025-03-31 DIAGNOSIS — F41.0 PANIC DISORDER: ICD-10-CM

## 2025-03-31 DIAGNOSIS — R45.4 IRRITABILITY AND ANGER: ICD-10-CM

## 2025-03-31 DIAGNOSIS — G47.9 SLEEP DIFFICULTIES: ICD-10-CM

## 2025-03-31 PROCEDURE — 99214 OFFICE O/P EST MOD 30 MIN: CPT

## 2025-03-31 PROCEDURE — 4010F ACE/ARB THERAPY RXD/TAKEN: CPT

## 2025-03-31 RX ORDER — DEXTROAMPHETAMINE SACCHARATE, AMPHETAMINE ASPARTATE MONOHYDRATE, DEXTROAMPHETAMINE SULFATE AND AMPHETAMINE SULFATE 3.75; 3.75; 3.75; 3.75 MG/1; MG/1; MG/1; MG/1
15 CAPSULE, EXTENDED RELEASE ORAL
Qty: 60 CAPSULE | Refills: 0 | Status: SHIPPED | OUTPATIENT
Start: 2025-03-31 | End: 2025-04-30

## 2025-03-31 RX ORDER — LAMOTRIGINE 100 MG/1
50 TABLET ORAL DAILY
Qty: 15 TABLET | Refills: 1 | Status: SHIPPED | OUTPATIENT
Start: 2025-04-14 | End: 2025-06-13

## 2025-03-31 RX ORDER — ARIPIPRAZOLE 15 MG/1
15 TABLET ORAL DAILY
Qty: 30 TABLET | Refills: 1 | Status: SHIPPED | OUTPATIENT
Start: 2025-03-31 | End: 2025-05-30

## 2025-03-31 NOTE — PROGRESS NOTES
"Glenroy Parker is a 39 y.o. male patient presenting for a(an)  in-person FUV  Visit location: patient (Glenroy Parker,  Pool Office), provider (DARRICK Krishna,  Pool office)  Pt identify self by name, , and address    Chief Complaint   Patient presents with    Anxiety    Depression    Irritability    Sleeping Problem    ADHD    Follow-up    Med Management     HPI    2025  Reports minimal improvement with depressive symptoms as well as irritable mood, \"lashed out at wife yesterday for no reason.\"  Increased Abilify 10 mg during last visit as instructed as well as Prozac 60 mg.  Serum takes trazodone because of drowsiness in the morning.  Has only used 2 tablets of Klonopin since last visit.  Otherwise reports minimal panic attacks and mostly managed anxiety, continue to take buspirone as ordered.  Denies SI/hallucinations/delusions/maki/hypomania.  Continue to struggle with mood swings primarily anger/irritability.  Appetite and sleep remain relatively the same.  Denies any noticeable side effects from medication.    Current S/Sx:  -Mood swings: mood swings, ongoing anger/irritability  -Depressive mood: see PHQ-9 (7)  -Fatigue/Energy: nearly daily  -Feeling hope/help/worthless: yes  -Sleep: sleeps 3-5yrs per night.   -Motivation: low  -Appetite/Weight Changes: increased appetite, gained about 30 lbs in the past 6 months  -Psychosis: denies hallucinations/delusions  -SI/HI: Denies current suicidal intent/plan  -Guns/Weapons at home: guns in the house, locked up in a safe place     -Worry excessively: Mostly managed  -PHQ 7 (4)     Panic attacks  Denies recent intense panic attacks     HISTORY  PSYCH HISTORY  -Psych Hx: KAROL, MDD  -Psych Hospitalization Hx: denies  -Suicide Attempt Hx: tried to hang self as a teenager x1 (\"realized it was stupid.\")  -Self-Harm/Violence Hx: denies  -Current psych meds: Wellbutrin  mg daily, Hydroxyzine 25 mg BID as needed (causes " drowsiness)  -Psych Med Hx: Wellbutrin  mg daily, Hydroxyzine 25 mg as needed, Abilify 5 mg daily (didn't feel any different), Lamotrigine 200 mg daily (didn't feel any different), Mirtazapine 7.5 mg at bedtime, Klonopin 1 mg (didn't help), Trazodone 50 mg (ineffective)     SUBSTANCE USE HISTORY  -Substance Use Hx: denies  -ETOH: occasionally  -Tobacco: about 1 ppd since 16 - 17 yrs now  -Caffeine: 2-5 cups coffee/day depending of level of fatigue  -Substance Abuse Treatment Hx: denies     FAMILY HISTORY  -Family Psych Hx: depression  -Family Suicide Hx: denies  -Family Substance Abuse Hx: dad: alcoholic     SOCIAL HISTORY  -Upbringing: Grew up with both parents. 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       PHYSICAL EXAM  Physical Exam  Psychiatric:         Attention and Perception: Attention and perception normal.         Mood and Affect: Mood and affect normal.         Speech: Speech normal.         Behavior: Behavior normal. Behavior is cooperative.         Thought Content: Thought content normal.         Cognition and Memory: Cognition and memory normal.          IMPRESSION  FUV today via in person. Patient reports minimal improvement with depressive symptoms and irritable mood especially towards wife.  Continue to take Abilify 10 mg daily for irritable mood/anger, Prozac 60 mg daily, Adderall XR 15 mg twice daily for attention/focus/concentration, has only noticed minimal improvement with symptoms.  Stop using trazodone because of drowsiness in the morning.  Has only used 2 tablets of Klonopin since last  visit.   Denies SI/hallucinations/delusions/maki/hypomania.  Continue to struggle with mood swings primarily anger/irritability.  Appetite and sleep remain relatively the same.  Denies any noticeable side effects from medication. Discussed to increase Abilify 50 mg daily, add Lamictal after 2 weeks for irritability, may switch from Prozac to Paxil or Viibryd to further stabilize depression and anxiety continue taking Adderall XR 50 mg twice daily, hold buspirone as this may not be helping anymore for anxiety.  Continue to use trazodone and Klonopin as before for sleep and no panic attacks respectively.  Will follow up with this provider in 3 weeks to continue monitoring, or sooner if needed.      Plan:     1. Reviewed diagnostic impression including subjective and objective data and provided education about Panic attacks, MDD, KAROL, bipolar disorder, and ADHD, etiology, treatment recommendations including medication, therapy, course of treatment and prognosis. Patient amenable to treatment plan.     2. Safety Assessment: History of no thoughts, but denies current thoughts of SI, plan/intent.  No h/o SA. RF include male, unmarried/single, prior suicide attempt(s), chronic medical illness, access to weapons, and panic attacks. PF include strong coping skills, social support/connectedness, moral objections to suicide, hopefulness/future orientation, marriage/partnership, and employment, engaged in care, future oriented, no guns.  Low imminent risk     3. @  Problem List Items Addressed This Visit       Panic disorder    Sleep difficulties    Persistent depressive disorder [F34.1]    KAROL (generalized anxiety disorder) [F41.1]    Attention deficit hyperactivity disorder (ADHD)    Relevant Medications    amphetamine-dextroamphetamine XR (Adderall XR) 15 mg 24 hr capsule    Irritability and anger    Relevant Medications    ARIPiprazole (Abilify) 15 mg tablet    lamoTRIgine (LaMICtal) 100 mg tablet (Start on 4/14/2025)       Dx: MDD, KAROL, ADHD, r/o bipolar 2 d/o  CONTINUE Prozac 60 mg daily (40 + 20 mg) - will wean off to Viibryd  CONTINUE Trazodone 100 mg; take 1 - 2 tabs at bedtime prn for sleep  CONTINUE Klonopin 0.5 mg daily prn for panic attacks (15 tabs more issued)  CONTINUE Adderall XR 15 mg BID (2 refills issued until 05/18) - try goodrx coupon (may switch to Concerta 27 mg if neccessary)  INCREASE Abilify 15 mg daily   HOLD Buspirone 15 mg BID  START Lamotrigine 100 mg, take 0.5 tabs (50 mg) daily starting 4/7/25     Reviewed r/b/a, possible side effects of the medication. Client is aware about the benefit outweighs the risk.     4. INDIVIDUAL THERAPY: weekly counseling with Dr. Yang Marquez since a month ago, missed 2 appts     5. OARRS: I have personally reviewed the OARRS report for Glenroy Parker. I have considered the risks of abuse, dependence, addiction and diversion. Last filled Adderall ER 15 mg on 02/23/2025 (60 caps x 30 days), Klonopin 0.5 mg on 11/04/2024  (15 tabs x 15 days), Oxycodone 5 mg on 03/05/2024 (20 tabs x5 days), Ativan 1 mg 06/10/2024 (10 tabs x 10 days)     6. Labs reviewed    7. Last Urine Drug Screen:   Date of Last Screen: 7/22/2024     8. Controlled Substance Agreement:   Date of the Last Agreement:  CSA (benzo) 6/10/2024 , CSA (stimulants): 7/22/2024      9. Follow-up with this provider in 3 weeks.     10. -Total time: 24 minutes via in person     - Follow up with physical health providers as scheduled  - May follow up sooner if experiences worsening symptoms by calling  Psychiatry at [phone]  - Patient verbalized an understanding to call Mobile Crisis at [phone] ([facility]), 211, or 121/go to the nearest emergency room if experiences thoughts of harm to self or others.

## 2025-04-04 ENCOUNTER — APPOINTMENT (OUTPATIENT)
Dept: BEHAVIORAL HEALTH | Facility: CLINIC | Age: 40
End: 2025-04-04
Payer: COMMERCIAL

## 2025-04-04 DIAGNOSIS — F34.1 PERSISTENT DEPRESSIVE DISORDER: ICD-10-CM

## 2025-04-04 DIAGNOSIS — F41.9 ANXIETY: ICD-10-CM

## 2025-04-04 DIAGNOSIS — F41.1 GAD (GENERALIZED ANXIETY DISORDER): ICD-10-CM

## 2025-04-04 DIAGNOSIS — F90.0 ATTENTION DEFICIT HYPERACTIVITY DISORDER (ADHD), PREDOMINANTLY INATTENTIVE TYPE: ICD-10-CM

## 2025-04-04 PROCEDURE — 4010F ACE/ARB THERAPY RXD/TAKEN: CPT | Performed by: PSYCHOLOGIST

## 2025-04-04 PROCEDURE — 90834 PSYTX W PT 45 MINUTES: CPT | Performed by: PSYCHOLOGIST

## 2025-04-05 NOTE — PROGRESS NOTES
Dictated with [employer]  software:  11 AM [address] [facility] Psych for Persistent Depressive Dx and KAROL and ADHD (45 min, [id number]). In-person. Supportive/CBT.  patient still trying to get his medications worked out and is working with provider to reduce the amount of meds he is on.  Recently started lamotrigine and (50 mg/day), is now off the BuSpar, and will be titrating down on the Prozac.  He reported that his depression and anger have increased lately.  Noted that he has been thinking a lot more about his father and has also had to help out his mother who had a recent fall and she is now back in her home.  Had an argument with his wife that escalated and he told her that he wished she had had an , wife now being 19 weeks pregnant.  Felt bad after it apologized but has said this before.  Said that he gets mad and tries to be hurtful.  His wife tells him that  he pushes people around him.  She also told her that he always has to be right when they argue.  Patient wants to improve on this behavior.  He had his wife are going to [place] from  to .  Patient reported that his wife indicated that he does not value her mental health.  Patient brought in [facility] paperwork for me to complete again and will try to get this done over the next week. Next: 1 month.

## 2025-04-07 DIAGNOSIS — E78.5 HYPERLIPIDEMIA, UNSPECIFIED HYPERLIPIDEMIA TYPE: ICD-10-CM

## 2025-04-07 DIAGNOSIS — Z79.4 TYPE 2 DIABETES MELLITUS WITH HYPERGLYCEMIA, WITH LONG-TERM CURRENT USE OF INSULIN: ICD-10-CM

## 2025-04-07 DIAGNOSIS — E11.65 TYPE 2 DIABETES MELLITUS WITH HYPERGLYCEMIA, WITH LONG-TERM CURRENT USE OF INSULIN: ICD-10-CM

## 2025-04-10 ENCOUNTER — APPOINTMENT (OUTPATIENT)
Dept: BEHAVIORAL HEALTH | Facility: CLINIC | Age: 40
End: 2025-04-10
Payer: COMMERCIAL

## 2025-04-10 DIAGNOSIS — R45.4 IRRITABILITY AND ANGER: ICD-10-CM

## 2025-04-10 DIAGNOSIS — F34.1 PERSISTENT DEPRESSIVE DISORDER: ICD-10-CM

## 2025-04-10 DIAGNOSIS — F41.1 GAD (GENERALIZED ANXIETY DISORDER): ICD-10-CM

## 2025-04-10 DIAGNOSIS — F41.0 PANIC DISORDER: ICD-10-CM

## 2025-04-10 DIAGNOSIS — F90.0 ATTENTION DEFICIT HYPERACTIVITY DISORDER (ADHD), PREDOMINANTLY INATTENTIVE TYPE: ICD-10-CM

## 2025-04-10 LAB
ALBUMIN SERPL-MCNC: 4.7 G/DL (ref 3.6–5.1)
BUN SERPL-MCNC: 24 MG/DL (ref 7–25)
BUN/CREAT SERPL: ABNORMAL (CALC) (ref 6–22)
CALCIUM SERPL-MCNC: 9.7 MG/DL (ref 8.6–10.3)
CHLORIDE SERPL-SCNC: 100 MMOL/L (ref 98–110)
CHOLEST SERPL-MCNC: 213 MG/DL
CHOLEST/HDLC SERPL: 5 (CALC)
CO2 SERPL-SCNC: 25 MMOL/L (ref 20–32)
CREAT SERPL-MCNC: 0.7 MG/DL (ref 0.6–1.26)
EGFRCR SERPLBLD CKD-EPI 2021: 121 ML/MIN/1.73M2
EST. AVERAGE GLUCOSE BLD GHB EST-MCNC: 203 MG/DL
EST. AVERAGE GLUCOSE BLD GHB EST-SCNC: 11.2 MMOL/L
GLUCOSE SERPL-MCNC: 108 MG/DL (ref 65–99)
HBA1C MFR BLD: 8.7 % OF TOTAL HGB
HDLC SERPL-MCNC: 43 MG/DL
LDLC SERPL CALC-MCNC: 141 MG/DL (CALC)
NONHDLC SERPL-MCNC: 170 MG/DL (CALC)
PHOSPHATE SERPL-MCNC: 3.6 MG/DL (ref 2.5–4.5)
POTASSIUM SERPL-SCNC: 4.4 MMOL/L (ref 3.5–5.3)
SODIUM SERPL-SCNC: 137 MMOL/L (ref 135–146)
TRIGL SERPL-MCNC: 157 MG/DL

## 2025-04-10 PROCEDURE — 99214 OFFICE O/P EST MOD 30 MIN: CPT

## 2025-04-10 PROCEDURE — 4010F ACE/ARB THERAPY RXD/TAKEN: CPT

## 2025-04-10 RX ORDER — LAMOTRIGINE 100 MG/1
50 TABLET ORAL DAILY
Qty: 15 TABLET | Refills: 1 | Status: SHIPPED | OUTPATIENT
Start: 2025-04-10 | End: 2025-06-09

## 2025-04-10 RX ORDER — VILAZODONE HYDROCHLORIDE 20 MG/1
20 TABLET ORAL DAILY
Qty: 30 TABLET | Refills: 1 | Status: SHIPPED | OUTPATIENT
Start: 2025-04-10

## 2025-04-10 NOTE — PROGRESS NOTES
"Glenroy Parker is a 39 y.o. male patient presenting for a(an) virtual FUV  Visit location: patient (Glenroy Parker,  [place] [facility]), provider (DARRICK Krishna,  [place] office)  Pt identify self by name, , and address    Chief Complaint   Patient presents with    ADHD    Anxiety    Bipolar    Depression    Follow-up    Sleeping Problem    Med Management    Panic Attack     HPI    04/10/2025  \"The depression is still pretty bad. Trazodone makes very groggy the tired day so I don't like taking it. Ready to wean off Prozac.\"  Reports decreased irritability, was able to discuss and develop coping techniques in counseling with Dr. Marquez.  Reports drowsiness from trazodone, so does not take it as often at bedtime for sleep.  Has only used Klonopin once since last visit, she has about 7 pills left, anticipate using 1 during a flight on Monday.  Otherwise, denies panic attacks or mood swings.  Denies any noticeable side effects of medication.  Denies SI/hallucinations/delusions/maki/hypomania.  Notes mostly stable attention/focus/concentration.    Current S/Sx:  -Mood swings: stable,   -Depressive mood: see PHQ-9 (7)  -Fatigue/Energy: nearly daily  -Feeling hope/help/worthless: yes  -Sleep: sleeps 3-5yrs per night.   -Motivation: low  -Appetite/Weight Changes: increased appetite, gained about 30 lbs in the past 6 months  -Psychosis: denies hallucinations/delusions  -SI/HI: Denies current suicidal intent/plan  -Guns/Weapons at home: guns in the house, locked up in a safe place     -Worry excessively: Mostly managed  -PHQ 7 (4)     Panic attacks  Denies recent intense panic attacks     HISTORY  PSYCH HISTORY  -Psych Hx: KAROL, MDD  -Psych Hospitalization Hx: denies  -Suicide Attempt Hx: tried to hang self as a teenager x1 (\"realized it was stupid.\")  -Self-Harm/Violence Hx: denies  -Current psych meds: Wellbutrin  mg daily, Hydroxyzine 25 mg BID as needed (causes drowsiness)  -Psych Med Hx: " Wellbutrin  mg daily, Hydroxyzine 25 mg as needed, Abilify 5 mg daily (didn't feel any different), Lamotrigine 200 mg daily (didn't feel any different), Mirtazapine 7.5 mg at bedtime, Klonopin 1 mg (didn't help), Trazodone 50 mg (ineffective)     SUBSTANCE USE HISTORY  -Substance Use Hx: denies  -ETOH: occasionally  -Tobacco: about 1 ppd since 16 - 17 yrs now  -Caffeine: 2-5 cups coffee/day depending of level of fatigue  -Substance Abuse Treatment Hx: denies     FAMILY HISTORY  -Family Psych Hx: depression  -Family Suicide Hx: denies  -Family Substance Abuse Hx: dad: alcoholic     SOCIAL HISTORY  -Upbringing: Grew up with both parents. Has 1 brother  -Support system: good  -Trauma: emotional  -Education: some college  -Work: [employer]  -Marital Status: single, fiance  -Children: none  -Living situation: lives in house with fiance  -: denies  -Legal: denies      MEDICAL HISTORY  -PCP: Brian Hoang DO  -TBI/head trauma/LOC/seizure hx: 4-5 concoctions, several LOC     REVIEW OF SYSTEMS  Review of Systems   All other systems reviewed and are negative.       PHYSICAL EXAM  Physical Exam  Psychiatric:         Attention and Perception: Attention and perception normal.         Mood and Affect: Mood and affect normal.         Speech: Speech normal.         Behavior: Behavior normal. Behavior is cooperative.         Thought Content: Thought content normal.         Cognition and Memory: Cognition and memory normal.          IMPRESSION  FUV today via virtual. Patient reports mostly poor management of depressive symptoms including low motivation and energy.  Reports decrease anger/irritability.  Reports some sleep disturbance, does not take trazodone as often due to morning drowsiness effects.  Appetite is normal.  Attention/focus/concentration also seems well-controlled on current dose of Adderall.  Denies mood swings or panic attacks.  Anticipates flying on Monday, will take 1 tablet of Klonopin,  has 7 on hand.  Denies substance use.  Denies SI/hallucinations/delusions/maki/hypomania.  Discussed to continue Adderall as before, continue Abilify as before, wean off Prozac and transition to Viibryd 20 mg daily for better management of depressive symptoms.  Instructed to hold Lamictal until next visit, will add to further stabilize mood.  Will follow up with this provider in 4 weeks to continue monitoring, or sooner if needed.      Plan:     1. Reviewed diagnostic impression including subjective and objective data and provided education about Panic attacks, MDD, KAROL, bipolar disorder, and ADHD, etiology, treatment recommendations including medication, therapy, course of treatment and prognosis. Patient amenable to treatment plan.     2. Safety Assessment: History of no thoughts, but denies current thoughts of SI, plan/intent.  No h/o SA. RF include male, unmarried/single, prior suicide attempt(s), chronic medical illness, access to weapons, and panic attacks. PF include strong coping skills, social support/connectedness, moral objections to suicide, hopefulness/future orientation, marriage/partnership, and employment, engaged in care, future oriented, no guns.  Low imminent risk     3. @  Problem List Items Addressed This Visit       Panic disorder    Persistent depressive disorder [F34.1]    Relevant Medications    vilazodone (Viibryd) 20 mg tablet    KAROL (generalized anxiety disorder) [F41.1]    Attention deficit hyperactivity disorder (ADHD)    Irritability and anger    Relevant Medications    lamoTRIgine (LaMICtal) 100 mg tablet        Dx: MDD, KAROL, ADHD, r/o bipolar 2 d/o  DECREASE Prozac 40 mg daily x 7 days, then 20 mg daily x 7 days, then STOP  CONTINUE Trazodone 100 mg; take 1 - 2 tabs at bedtime prn for sleep  CONTINUE Klonopin 0.5 mg daily prn for panic attacks (15 tabs more issued)  CONTINUE Adderall XR 15 mg BID (2 refills issued until 05/18) - try goodrx coupon (may switch to Concerta 27 mg if  neccessary)  INCREASE Abilify 15 mg daily   START Lamotrigine 100 mg, take 0.5 tabs (50 mg) daily starting 4/7/25  START Viibryd 20 mg daily starting 4/17/2025     Reviewed r/b/a, possible side effects of the medication. Client is aware about the benefit outweighs the risk.     4. INDIVIDUAL THERAPY: weekly counseling with Dr. Yang Marquez since a month ago, missed 2 appts     5. OARRS: I have personally reviewed the OARRS report for Glenroy Parker. I have considered the risks of abuse, dependence, addiction and diversion. Last filled Adderall ER 15 mg on 04/01/2025 (60 caps x 30 days), Klonopin 0.5 mg on 11/04/2024  (15 tabs x 15 days), Oxycodone 5 mg on 03/05/2024 (20 tabs x5 days), Ativan 1 mg 06/10/2024 (10 tabs x 10 days)     6. Labs reviewed    7. Last Urine Drug Screen:   Date of Last Screen: 7/22/2024     8. Controlled Substance Agreement:   Date of the Last Agreement:  CSA (benzo) 6/10/2024 , CSA (stimulants): 7/22/2024      9. Follow-up with this provider in 4 weeks.     10. -Total time: 21 minutes via virtual     - Follow up with physical health providers as scheduled  - May follow up sooner if experiences worsening symptoms by calling  Psychiatry at [phone]  - Patient verbalized an understanding to call [facility] at [phone] ([facility]), 211, or 911/go to the nearest emergency room if experiences thoughts of harm to self or others.

## 2025-04-15 ENCOUNTER — APPOINTMENT (OUTPATIENT)
Dept: PAIN MEDICINE | Facility: HOSPITAL | Age: 40
End: 2025-04-15
Payer: COMMERCIAL

## 2025-04-22 DIAGNOSIS — E11.65 TYPE 2 DIABETES MELLITUS WITH HYPERGLYCEMIA, WITHOUT LONG-TERM CURRENT USE OF INSULIN: Primary | ICD-10-CM

## 2025-04-22 RX ORDER — GLIPIZIDE 10 MG/1
10 TABLET ORAL
Qty: 180 TABLET | Refills: 1 | Status: SHIPPED | OUTPATIENT
Start: 2025-04-22 | End: 2026-04-22

## 2025-04-23 ENCOUNTER — TREATMENT (OUTPATIENT)
Dept: PHYSICAL THERAPY | Facility: CLINIC | Age: 40
End: 2025-04-23
Payer: COMMERCIAL

## 2025-04-23 DIAGNOSIS — R45.4 IRRITABILITY AND ANGER: ICD-10-CM

## 2025-04-23 DIAGNOSIS — M54.2 MYOFASCIAL NECK PAIN: ICD-10-CM

## 2025-04-23 PROCEDURE — 97110 THERAPEUTIC EXERCISES: CPT | Mod: GP | Performed by: PHYSICAL THERAPIST

## 2025-04-23 PROCEDURE — 97140 MANUAL THERAPY 1/> REGIONS: CPT | Mod: GP | Performed by: PHYSICAL THERAPIST

## 2025-04-23 RX ORDER — ARIPIPRAZOLE 15 MG/1
15 TABLET ORAL DAILY
Qty: 90 TABLET | Refills: 1 | Status: SHIPPED | OUTPATIENT
Start: 2025-04-23

## 2025-04-23 NOTE — PROGRESS NOTES
PHYSICAL THERAPY   TREATMENT NOTE    Patient Name:  Glenroy Parker   Patient MRN: [id number]  Date: 4/23/2025    Time Calculation  Start Time: 1155  Stop Time: 1230  Time Calculation (min): 35 min    Insurance:  Insurance Type: [employer]  Visit number: 8  Approved # of visits [place]  Authorization Needed: No  Last recheck 3/26/25    General   Reason for visit: neck pain L  Referred by: Ling    Therapy diagnoses:   Problem List Items Addressed This Visit           ICD-10-CM    Myofascial neck pain M54.2       Assessment:    Pt is demonstrating good progress at this time with decreased pain levels with cervical extension and rotation ROM. Upon cervical AROM screen, patient has decreased pain levels with cervical extension and rotation, but side bending to R causes discomfort. Noted high tone in L SCM - After L SCM STM and manual therapy intervention, patient pain levels decreased with SB to R. Pt symptoms present due to muscular limitations rather than joint. Encouraged to continue strengthening exercises at home due to low compliance and added L SCM stretch to HEP.    Plan:  Check compliance with midback strengthening and L SCM stretch. Continue manual to address soft tissue mobility for improved posture. Progress scapular, midback, and cervical strength exercises with postural cueing.      Subjective  Pt only had symptoms one time during vacation week while sitting on the plane. Fell asleep on couch last night and woke up with increased soreness. Pt felt good at end of last session. Did stretches since last session, but not banded strengthening due to busy on vacation. Feeling better overall with multiple days pain free, but wants to continue working on strength. Pt only has pain with cervical side bending to the R, but other motions cause no pain. Pain decreased with SB to R post manual therapy.   - Pain (0-10): 3/10 L neck pain, minimal tingling in fingers 1-2.     Precautions  PMH: KELLY, gallstone pancreatitis,  "HTN, Obesity, depression, DM with neuropathy, smoker, herniated disc in lumbar spine, R meniscus tear  Fall risk -  none    Objective   Access Code: [email or web address] (cervical retraction, scap retraction, lev scap stretch, thoracic ext pec stretch, prone I T & Y, rows blue, ER blue, shoulder ext blue, Standing Y blue, L SCM stretch)    Treatment Performed:   Therapeutic Exercise:    - UBE: L3 x 3' for / 3' rev hills   - doorway pec stretch x 30\" mid/high  - quadruped alternating Y's R/L x 15  - cat cows x 10  - quadruped alternating swims x 10 R/L  - elbow plank 2 x 15\"  - wall posture with green B shoulder horizontal abd 3 x 10  - B cheerleaders green wall posture 2 x 10  - cervical AROM assessment before and after manual therapy    Manual Therapy:  STM/DTM B cervical paraspinals, UT, L SCM  B UT stretch  Lateral glides to R   Cervical distraction       PT Therapeutic Procedures Time Entry  Manual Therapy Time Entry: 20  Therapeutic Exercise Time Entry: 15               '  "

## 2025-04-24 ENCOUNTER — OFFICE VISIT (OUTPATIENT)
Dept: PAIN MEDICINE | Facility: HOSPITAL | Age: 40
End: 2025-04-24
Payer: COMMERCIAL

## 2025-04-24 DIAGNOSIS — M51.9 LUMBAR DISC DISEASE: ICD-10-CM

## 2025-04-24 PROCEDURE — 99214 OFFICE O/P EST MOD 30 MIN: CPT | Performed by: ANESTHESIOLOGY

## 2025-04-24 PROCEDURE — 99204 OFFICE O/P NEW MOD 45 MIN: CPT | Performed by: ANESTHESIOLOGY

## 2025-04-24 PROCEDURE — 4010F ACE/ARB THERAPY RXD/TAKEN: CPT | Performed by: ANESTHESIOLOGY

## 2025-04-24 ASSESSMENT — PAIN SCALES - GENERAL: PAINLEVEL_OUTOF10: 7

## 2025-04-24 NOTE — PROGRESS NOTES
Chief Complaint   Patient presents with    Back Pain    Extremity Pain    Neck Pain     Subjective   Patient ID: Glenroy Parker is a 39 y.o. male with a past medical history of HTN, HLD, T2DM, GERD, anxiety and depression, and chronic neck and back pain who presents today for lower back pain.     He states that he has neck and back pain, but is currently doing physical therapy for his neck pain.    His back pain is about 6/10, and radiating from his lower left back down his left leg. This is worsened with prolonged movement and sitting, and can flare to 8/10. He states he has had injections years about with mixed results. These were intralaminar L4/L5 epidural steroid injection and left L4 transforaminal epidural steroid injection.     Physical Therapy: The patient has not done physical therapy within the past six months  Other Conservative Measures he has tried: Heating Pad, Ice, and Injections  Classes of medications tried in the past: Acetaminophen and NSAIDs    Last Urine Drug Screen:  Recent Results (from the past 8760 hours)   Drug Screen, Urine With Reflex to Confirmation    Collection Time: 07/22/24  2:16 PM   Result Value Ref Range    Amphetamine Screen, Urine Presumptive Negative Presumptive Negative    Barbiturate Screen, Urine Presumptive Negative Presumptive Negative    Benzodiazepines Screen, Urine Presumptive Negative Presumptive Negative    Cannabinoid Screen, Urine Presumptive Negative Presumptive Negative    Cocaine Metabolite Screen, Urine Presumptive Negative Presumptive Negative    Fentanyl Screen, Urine Presumptive Negative Presumptive Negative    Opiate Screen, Urine Presumptive Negative Presumptive Negative    Oxycodone Screen, Urine Presumptive Negative Presumptive Negative    PCP Screen, Urine Presumptive Negative Presumptive Negative    Methadone Screen, Urine Presumptive Negative Presumptive Negative     Results are as expected.       Review of Systems   13-point ROS done and negative  "except for HPI.     Current Outpatient Medications   Medication Instructions    albuterol (ProAir HFA) 90 mcg/actuation inhaler 2 puffs, inhalation, Every 4 hours PRN    amphetamine-dextroamphetamine XR (Adderall XR) 15 mg 24 hr capsule 15 mg, oral, 2 times daily (morning and late afternoon), Do not crush or chew.    ARIPiprazole (ABILIFY) 15 mg, oral, Daily    benzonatate (TESSALON) 200 mg, oral, 3 times daily PRN, Do not crush or chew.    benzoyl peroxide (Benzac AC) 10 % external wash 1 Application, Topical, Daily    blood-glucose sensor (Dexcom G7 Sensor) device Change every 10 days    busPIRone (BUSPAR) 15 mg, oral, 2 times daily    celecoxib (CELEBREX) 200 mg, oral, 2 times daily    Clenpiq 10 mg-3.5 gram- 12 gram/175 mL solution TAKE 1 KIT BY MOUTH 1 TIME FOR 1 DOSE.    clindamycin (Cleocin T) 1 % lotion Topical, 2 times daily    FLUoxetine (PROZAC) 40 mg, oral, Daily    FLUoxetine (PROZAC) 20 mg, oral, Daily    glipiZIDE (GLUCOTROL) 10 mg, oral, 2 times daily before meals    Jardiance 10 mg, oral, Daily    lamoTRIgine (LAMICTAL) 50 mg, oral, Daily    losartan (COZAAR) 50 mg, oral, Daily    metFORMIN XR (GLUCOPHAGE-XR) 1,000 mg, oral, 2 times daily (morning and late afternoon)    minocycline 100 mg capsule 1 capsule, Daily (0630)    omeprazole (PriLOSEC) 40 mg DR capsule Take by mouth.    Ozempic 2 mg, subcutaneous, Every 7 days    pen needle, diabetic (BD Ael 2nd Gen Pen Needle) 32 gauge x 5/32\" needle Use 2 a day with insulin as directed    pioglitazone (Actos) 15 mg tablet 1 tab daily    traZODone (DESYREL) 100-200 mg, oral, Nightly PRN    vilazodone (VIIBRYD) 20 mg, oral, Daily       Medical History[1]     Surgical History[2]     Family History[3]     RX Allergies[4]     Objective     There were no vitals filed for this visit.     Physical Exam  General: NAD, well groomed, well nourished  Eyes: Non-icteric sclera, EOMI  Ears, Nose, Mouth, and Throat: External ears and nose appear to be without deformity " or rash. No lesions or masses noted. Hearing is grossly intact.   Neck: Trachea midline  Respiratory: Nonlabored breathing   Cardiovascular: no peripheral edema   Skin: No rashes or open lesions/ulcers identified on skin.    Back:   Palpation: No tenderness to palpation over lumbar paraspinous muscles.   Straight leg raise: positive at 50 degrees on the left  KARL Maneuver does not reproduce pain bilaterally    Hip: No pain over greater trochanters. and Pain not reproduced with hip internal/external rotation.     Neurologic:   Cranial nerves grossly intact.   Strength 5/5 and symmetric plantar/dorsiflexion   Sensation: Normal to light touch throughout  DTRs:normal and symmetric throughout  Hollis: absent  Clonus: absent  Uses Assist Device: no    Psychiatric: Alert, orientation to person, place, and time. Cooperative.    Imaging   2/5/2020 MRI Lumbar Spine  L4-5: The disc has a small central left protrusion abutting the  thecal sac with mild stenosis of the left lateral recess from the  protrusion and ligament thickening. The facet joints are mildly  arthritic.    Assessment/Plan   Glenroy Parker is a 39 y.o. male with a past medical history of HTN, HLD, T2DM, GERD, anxiety and depression, and chronic neck and back pain who presents today for lower back pain.     Most likely lumbar radiculopathy. Given varied response to injections previously and MRI only from 2020, plan to start PT for lower back again and get an updated MRI.     Plan:  - Start PT for lower back.  - Plan for updated MRI in the future.    Follow up: After MRI    The patient was invited to contact us back anytime with any questions or concerns and follow-up with us in the office as needed.     Diagnoses and all orders for this visit:  Lumbar disc disease  -     Referral to Physical Therapy; Future    This note was generated with the aid of dictation software, there may be typos despite my attempts at proofreading.   The patient was discussed and  seen with Dr. Joseph.    Young Green DO PGY-1          [1]   Past Medical History:  Diagnosis Date    Diabetes (Multi)     Other specified soft tissue disorders 11/16/2015    Leg swelling    Personal history of other diseases of the digestive system 08/11/2016    History of esophageal reflux   [2]   Past Surgical History:  Procedure Laterality Date    KNEE ARTHROSCOPY W/ DEBRIDEMENT  02/18/2016    Arthroscopy Knee Right    NECK SURGERY  02/18/2016    Neck Surgery    OTHER SURGICAL HISTORY  09/12/2016    Stab Phlebectomy Of Varicose Veins    OTHER SURGICAL HISTORY  06/07/2016    Venous Ligation With Stripping    OTHER SURGICAL HISTORY  06/07/2016    Endovenous Ablation Of Incompetent Vein    TONSILLECTOMY  11/16/2015    Tonsillectomy   [3]   Family History  Problem Relation Name Age of Onset    Hypertension Mother      Other (varicose veins) Mother     [4] No Known Allergies

## 2025-05-04 DIAGNOSIS — F34.1 PERSISTENT DEPRESSIVE DISORDER: ICD-10-CM

## 2025-05-05 RX ORDER — VILAZODONE HYDROCHLORIDE 20 MG/1
20 TABLET ORAL DAILY
Qty: 90 TABLET | Refills: 1 | Status: SHIPPED | OUTPATIENT
Start: 2025-05-05 | End: 2025-05-08

## 2025-05-06 ENCOUNTER — APPOINTMENT (OUTPATIENT)
Dept: PRIMARY CARE | Facility: CLINIC | Age: 40
End: 2025-05-06
Payer: COMMERCIAL

## 2025-05-06 VITALS
SYSTOLIC BLOOD PRESSURE: 138 MMHG | BODY MASS INDEX: 39.87 KG/M2 | WEIGHT: 315 LBS | HEART RATE: 79 BPM | OXYGEN SATURATION: 98 % | DIASTOLIC BLOOD PRESSURE: 90 MMHG

## 2025-05-06 DIAGNOSIS — I10 BENIGN ESSENTIAL HYPERTENSION: ICD-10-CM

## 2025-05-06 DIAGNOSIS — H65.00 ACUTE SEROUS OTITIS MEDIA, RECURRENCE NOT SPECIFIED, UNSPECIFIED LATERALITY: Primary | ICD-10-CM

## 2025-05-06 PROCEDURE — 4010F ACE/ARB THERAPY RXD/TAKEN: CPT | Performed by: STUDENT IN AN ORGANIZED HEALTH CARE EDUCATION/TRAINING PROGRAM

## 2025-05-06 PROCEDURE — 3080F DIAST BP >= 90 MM HG: CPT | Performed by: STUDENT IN AN ORGANIZED HEALTH CARE EDUCATION/TRAINING PROGRAM

## 2025-05-06 PROCEDURE — 3075F SYST BP GE 130 - 139MM HG: CPT | Performed by: STUDENT IN AN ORGANIZED HEALTH CARE EDUCATION/TRAINING PROGRAM

## 2025-05-06 PROCEDURE — 99213 OFFICE O/P EST LOW 20 MIN: CPT | Performed by: STUDENT IN AN ORGANIZED HEALTH CARE EDUCATION/TRAINING PROGRAM

## 2025-05-06 RX ORDER — LOSARTAN POTASSIUM 50 MG/1
50 TABLET ORAL DAILY
Qty: 90 TABLET | Refills: 3 | Status: SHIPPED | OUTPATIENT
Start: 2025-05-06 | End: 2026-05-06

## 2025-05-06 RX ORDER — AMOXICILLIN AND CLAVULANATE POTASSIUM 875; 125 MG/1; MG/1
875 TABLET, FILM COATED ORAL 2 TIMES DAILY
Qty: 20 TABLET | Refills: 0 | Status: SHIPPED | OUTPATIENT
Start: 2025-05-06 | End: 2025-05-16

## 2025-05-06 ASSESSMENT — ENCOUNTER SYMPTOMS: DEPRESSION: 0

## 2025-05-06 ASSESSMENT — PATIENT HEALTH QUESTIONNAIRE - PHQ9
2. FEELING DOWN, DEPRESSED OR HOPELESS: NOT AT ALL
1. LITTLE INTEREST OR PLEASURE IN DOING THINGS: NOT AT ALL
SUM OF ALL RESPONSES TO PHQ9 QUESTIONS 1 AND 2: 0

## 2025-05-06 NOTE — PROGRESS NOTES
Subjective   Patient ID: Glenroy Parker is a 39 y.o. male who presents for Earache (RT /X 9 days).    HPI     Ear ache 9 days, some pressure in right ear, no fever chils headach, no sort throat or other issues. Last month zen tot [place]    Review of Systems   All other systems reviewed and are negative.      Objective   /90   Pulse 79   Wt 145 kg (319 lb)   SpO2 98%   BMI 39.87 kg/m²     Physical Exam  Constitutional:       Appearance: Normal appearance.   HENT:      Head: Normocephalic and atraumatic.      Right Ear: Tympanic membrane and ear canal normal.      Left Ear: Tympanic membrane and ear canal normal.      Mouth/Throat:      Mouth: Mucous membranes are moist.      Pharynx: Oropharynx is clear.   Eyes:      Extraocular Movements: Extraocular movements intact.      Conjunctiva/sclera: Conjunctivae normal.      Pupils: Pupils are equal, round, and reactive to light.   Cardiovascular:      Rate and Rhythm: Normal rate and regular rhythm.      Pulses: Normal pulses.      Heart sounds: Normal heart sounds.   Pulmonary:      Effort: Pulmonary effort is normal.      Breath sounds: Normal breath sounds.   Abdominal:      General: Abdomen is flat. Bowel sounds are normal.      Palpations: Abdomen is soft.   Musculoskeletal:         General: Normal range of motion.      Cervical back: Normal range of motion and neck supple.   Skin:     General: Skin is warm and dry.      Capillary Refill: Capillary refill takes 2 to 3 seconds.   Neurological:      General: No focal deficit present.      Mental Status: He is alert and oriented to person, place, and time. Mental status is at baseline.   Psychiatric:         Mood and Affect: Mood normal.         Behavior: Behavior normal.         Thought Content: Thought content normal.         Judgment: Judgment normal.         Assessment/Plan   1. Benign essential hypertension    - losartan (Cozaar) 50 mg tablet; Take 1 tablet (50 mg) by mouth once daily.  Dispense: 90  tablet; Refill: 3    2. Acute serous otitis media, recurrence not specified, unspecified laterality (Primary)  B/l ear infection start pcn  - amoxicillin-clavulanate (Augmentin) 875-125 mg tablet; Take 1 tablet (875 mg) by mouth 2 times a day for 10 days.  Dispense: 20 tablet; Refill: 0

## 2025-05-07 ENCOUNTER — TREATMENT (OUTPATIENT)
Dept: PHYSICAL THERAPY | Facility: CLINIC | Age: 40
End: 2025-05-07
Payer: COMMERCIAL

## 2025-05-07 DIAGNOSIS — M54.2 MYOFASCIAL NECK PAIN: ICD-10-CM

## 2025-05-07 DIAGNOSIS — M54.16 LUMBAR RADICULOPATHY: Primary | ICD-10-CM

## 2025-05-07 PROCEDURE — 97012 MECHANICAL TRACTION THERAPY: CPT | Mod: GP | Performed by: PHYSICAL THERAPIST

## 2025-05-07 PROCEDURE — 97110 THERAPEUTIC EXERCISES: CPT | Mod: GP | Performed by: PHYSICAL THERAPIST

## 2025-05-07 PROCEDURE — 97161 PT EVAL LOW COMPLEX 20 MIN: CPT | Mod: GP | Performed by: PHYSICAL THERAPIST

## 2025-05-07 NOTE — PROGRESS NOTES
PHYSICAL THERAPY   EVALUATION & TREATMENT NOTE    Patient Name:  Glenroy Parker   Patient MRN: [id number]  Date: 5/7/2025    Time Calculation  Start Time: 1145  Stop Time: 1241  Time Calculation (min): 56 min    Insurance:  Insurance Type: [place]/[employer]  Visit number: 1 (8 used previously for neck)  [place]ved [place] of visits [place]  Authorization Needed: No    General   Reason for visit: LBP   Referred by: Molly Joseph    Therapy diagnoses:   Problem List Items Addressed This Visit           ICD-10-CM    Lumbar radiculopathy - Primary M54.16    Myofascial neck pain M54.2       ASSESSMENT   38 y/o male who has been seen in this clinic 8 visits for neck pain presents today c/o chronic LBP since he was 16 years old presenting to PT today with good LE strength, decreased core strength, decreased B LE flexibility, poor postural habits previously having done PT without benefit but requiring PT for MRI and possible future injections. His pain is limiting him from comfortably performing heavy household chores, sitting comfortably at work, standing for long periods of time and lifting heavy items, as well as sleeping comfortably through the night. Patient will benefit from skilled therapy to address these impairments and return to prior level of functioning.     PLAN - continue with cervical POC in addition to below plan for lumbar spine  Goals  1. Pt will be independent in HEP in 6 weeks  2. Pt will report 0-4/10 low back pain at rest and with activity in 6 weeks.  3. Pt will demonstrate 5/5 B LE strength to return to lifting heavy household items in 6 weeks  4. Pt will demonstrate full lumbar ROM to improve functional ADLS in 6 weeks.  5. Pt will report LOPEZ score < 25% in 6 weeks.    Plan of care to include: therapeutic exercise, therapeutic activity, soft tissue mobilization, joint mobilizations, neuromuscular re-education, pt education, self care activities, home program, dry needling, e-stim, mechanical traction    1x/week  for 6 weeks.    Patient agrees to plan of care.    SUBJECTIVE   Reviewed medical history form with patient and medical screening assessed     Slipped and fell when he was 16 and fractured his low back L4-L5, took a year to find it and has done lots of PT in the past and none has helped. Does not continue with his HEP because a lot of it makes it worse. Saw pain management who discussed injections or ablasians, but needs an MRI and PT first. When his back gets really bad he does note his L leg will give out on him.    Pain:  L LBP radiating down L posterior leg to the foot.  At worst, pain is 10/10  Exacerbating factors include standing or sitting too long (>10')  At best, pain is 5-6/10  Relieving factors include laying on his R side, taking Tylenol.    Function:  Has a hard time driving too long, standing to shower, sitting too long. Has increased pain with yardwork. Pushes through the pain. Does avoid lifting items that are too heavy.   Sleep - waking him nightly  Lives with his wife, they are about to have their first baby.  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 Has pain at end of day.    Social: charmaine  Exercise - none, just his PT HEP for his neck    Pt goals: get an MRI and injections    Language: English  Potential barriers to treatment: continue to assess    Precautions:    PMH:  KELLY, gallstone pancreatitis, HTN, Obesity, depression, DM with neuropathy, smoker, herniated disc in lumbar spine, R meniscus tear   Fall risk -  none      OBJECTIVE *=painful  Gait Ambulates IND, non antalgic    Observation/Posture  Sits with trunk lean forward and slightly R, thoracic kyphosis    Palpation  (+) TTP L lumbar paraspinals, L4/L5 SPs    Sensation  Intact to light touch B LE    Range of Motion (R, L in degrees)  Lumbar Flexion fingers to ankles with tingling  Lumbar Extension WNL with tingling  Lumbar Sidebending WNL*, WNL*  Lumbar Rotation WNL, mod  "dec*    Flexibility (R, L)  Hamstrings min dec, max dec*  Quads mod dec, mod dec    Strength (R, L MMT out of 5)  Hip Flexion 5, 5  Hip Extension 5, 5  Hip Abduction 5, 5  Knee Flexion 5, 5  Knee Extension 5, 5  Ankle Dorsiflexion 5, 5    Special Tests  Repeated lumbar flexion - increased radicular symptoms to foot  Repeated lumbar extension - increased     Outcome Measures  LOPEZ = 50%    Today's treatment and initial evaluation included:  - Patient education regarding diagnosis, prognosis, contributing factors, comorbidities, activity modification, symptom monitoring, importance of HEP, role of PT, postural re-education, weight loss, work ergonomics, body mechanics, return to sport, reviewed office cancel/no show policy.  - Review of POC   - Therapeutic Exercise & given HEP handout:  Access Code: [email or web address]  - Supine Lower Trunk Rotation  - 1 x daily - 10 reps - 10 sec hold  - Supine Bridge  - 1 x daily - 2 sets - 15 reps - 5 sec hold  - Unilateral Supine Isometric Hip Flexion  - 1 x daily - 2 sets - 10 reps - 10 sec hold  - Seated Figure 4 Piriformis Stretch  - 1 x daily - 3 reps - 30 sec hold    Modalities  Mechanical Lumbar traction in hooklying, intermittent 45\" on // 15\" off, 45-90# x 12'    PT Evaluation Time Entry  PT Evaluation (Low) Time Entry: 20  PT Therapeutic Procedures Time Entry  Therapeutic Exercise Time Entry: 14  PT Modalities Time Entry  Mechanical Traction Time Entry: 12               "

## 2025-05-08 ENCOUNTER — APPOINTMENT (OUTPATIENT)
Dept: BEHAVIORAL HEALTH | Facility: CLINIC | Age: 40
End: 2025-05-08
Payer: COMMERCIAL

## 2025-05-08 DIAGNOSIS — F41.1 GAD (GENERALIZED ANXIETY DISORDER): ICD-10-CM

## 2025-05-08 DIAGNOSIS — G47.9 SLEEP DIFFICULTIES: ICD-10-CM

## 2025-05-08 DIAGNOSIS — F90.0 ATTENTION DEFICIT HYPERACTIVITY DISORDER (ADHD), PREDOMINANTLY INATTENTIVE TYPE: ICD-10-CM

## 2025-05-08 DIAGNOSIS — F34.1 PERSISTENT DEPRESSIVE DISORDER: ICD-10-CM

## 2025-05-08 DIAGNOSIS — F41.0 PANIC DISORDER: ICD-10-CM

## 2025-05-08 DIAGNOSIS — R45.4 IRRITABILITY AND ANGER: ICD-10-CM

## 2025-05-08 PROCEDURE — 4010F ACE/ARB THERAPY RXD/TAKEN: CPT

## 2025-05-08 PROCEDURE — 99214 OFFICE O/P EST MOD 30 MIN: CPT

## 2025-05-08 RX ORDER — DEXTROAMPHETAMINE SACCHARATE, AMPHETAMINE ASPARTATE MONOHYDRATE, DEXTROAMPHETAMINE SULFATE AND AMPHETAMINE SULFATE 5; 5; 5; 5 MG/1; MG/1; MG/1; MG/1
20 CAPSULE, EXTENDED RELEASE ORAL EVERY MORNING
Qty: 30 CAPSULE | Refills: 0 | Status: SHIPPED | OUTPATIENT
Start: 2025-05-08 | End: 2025-06-07

## 2025-05-08 RX ORDER — VILAZODONE HYDROCHLORIDE 40 MG/1
40 TABLET ORAL DAILY
Qty: 30 TABLET | Refills: 1 | Status: SHIPPED | OUTPATIENT
Start: 2025-05-08

## 2025-05-08 RX ORDER — ARIPIPRAZOLE 20 MG/1
20 TABLET ORAL DAILY
Qty: 30 TABLET | Refills: 0 | Status: SHIPPED | OUTPATIENT
Start: 2025-05-08 | End: 2025-06-07

## 2025-05-08 NOTE — PROGRESS NOTES
"Glenroy Parker is a 39 y.o. male patient presenting for a(an) virtual FUV  Visit location: patient (Glenroy Parker,  [place] Office), provider (DARRICK Krishna,  [place] office)  Pt identify self by name, , and address    Chief Complaint   Patient presents with    ADHD    Anxiety    Depression    Irritable mood    Follow-up    Med Management    Panic Attack    Sleeping Problem     HPI    2025  \"Things are going pretty good especially depression. Difficulty with motivation to do anything. Still have the attitude problem. Poor concentration at work. Haven't started Viibryd yet as instructed.\"   Successfully weaned off Prozac, and noting that taking trazodone.  Still have 5 of Klonopin that we prescribed left, had to take 1 before boarding a flight for [employer].  Otherwise denies panic attacks or mood swings.  Denies any noticeable side effects from medications  Not resumed taking Lamictal as instructed, but increased Abilify 50 mg daily as stated Viibryd 20 mg daily  Sleep and appetite are good  Denies SI/HI/AVH/delusions/maki/hypomania    Current S/Sx:  -Mood swings: stable,   -Depressive mood: see PHQ-9 (7)  -Fatigue/Energy: nearly daily  -Feeling hope/help/worthless: yes  -Sleep: sleeps 3-5yrs per night.   -Motivation: low  -Appetite/Weight Changes: increased appetite, gained about 30 lbs in the past 6 months  -Psychosis: denies hallucinations/delusions  -SI/HI: Denies current suicidal intent/plan  -Guns/Weapons at home: guns in the house, locked up in a safe place     -Worry excessively: Mostly managed  -PHQ 7 (4)     Panic attacks  Denies recent intense panic attacks     HISTORY  PSYCH HISTORY  -Psych Hx: KAROL, MDD  -Psych Hospitalization Hx: denies  -Suicide Attempt Hx: tried to hang self as a teenager x1 (\"realized it was stupid.\")  -Self-Harm/Violence Hx: denies  -Current psych meds: Wellbutrin  mg daily, Hydroxyzine 25 mg BID as needed (causes drowsiness)  -Psych Med Hx: " Wellbutrin  mg daily, Hydroxyzine 25 mg as needed, Abilify 5 mg daily (didn't feel any different), Lamotrigine 200 mg daily (didn't feel any different), Mirtazapine 7.5 mg at bedtime, Klonopin 1 mg (didn't help), Trazodone 50 mg (ineffective)     SUBSTANCE USE HISTORY  -Substance Use Hx: denies  -ETOH: occasionally  -Tobacco: about 1 ppd since 16 - 17 yrs now  -Caffeine: 2-5 cups coffee/day depending of level of fatigue  -Substance Abuse Treatment Hx: denies     FAMILY HISTORY  -Family Psych Hx: depression  -Family Suicide Hx: denies  -Family Substance Abuse Hx: dad: alcoholic     SOCIAL HISTORY  -Upbringing: Grew up with both parents. Has 1 brother  -Support system: good  -Trauma: emotional  -Education: some college  -Work: [employer] department  -Marital Status: single, fiance  -Children: none  -Living situation: lives in house with fiance  -: denies  -Legal: denies      MEDICAL HISTORY  -PCP: Brian Hoang DO  -TBI/head trauma/LOC/seizure hx: 4-5 concoctions, several LOC     REVIEW OF SYSTEMS  Review of Systems   All other systems reviewed and are negative.       PHYSICAL EXAM  Physical Exam  Psychiatric:         Attention and Perception: Attention and perception normal.         Mood and Affect: Mood and affect normal.         Speech: Speech normal.         Behavior: Behavior normal. Behavior is cooperative.         Thought Content: Thought content normal.         Cognition and Memory: Cognition and memory normal.          IMPRESSION  FUV today via virtual. Patient reports improved depressive symptoms since starting Viibryd 20 mg daily.  Reports a decline in attentiveness as well as concentration/focus on current dose of Adderall XR 15 mg twice daily.  Reports intermittent irritable mood on current dose of Abilify despite increasing dose from 10 to 15 mg.  Has needed to take 2 tabs of Klonopin since last visit.  Denies any noticeable side effects from medication.  Denies substance use.  Appetite  and sleep are mostly normal.  Denies SI/HI/AVH/delusions/maki/hypomania.  Discussed to increase Adderall XR 20 mg twice daily (increased by 10 mg/day), increase Abilify 20 mg daily for irritable mood, increase Viibryd 40 mg, effective management of anxiety and depression.  Hold lamotrigine for now may reconsider if Abilify remains ineffective in managing irritable mood.   Will follow up with this provider in 4 weeks to continue monitoring, or sooner if needed.      Plan:     1. Reviewed diagnostic impression including subjective and objective data and provided education about Panic attacks, MDD, KAROL, bipolar disorder, and ADHD, etiology, treatment recommendations including medication, therapy, course of treatment and prognosis. Patient amenable to treatment plan.     2. Safety Assessment: History of no thoughts, but denies current thoughts of SI, plan/intent.  No h/o SA. RF include male, unmarried/single, prior suicide attempt(s), chronic medical illness, access to weapons, and panic attacks. PF include strong coping skills, social support/connectedness, moral objections to suicide, hopefulness/future orientation, marriage/partnership, and employment, engaged in care, future oriented, no guns.  Low imminent risk     3. @  Problem List Items Addressed This Visit       Panic disorder    Sleep difficulties    Persistent depressive disorder [F34.1]    Relevant Medications    vilazodone (Viibryd) 40 mg tablet    KAROL (generalized anxiety disorder) [F41.1]    Relevant Medications    vilazodone (Viibryd) 40 mg tablet    Attention deficit hyperactivity disorder (ADHD)    Relevant Medications    amphetamine-dextroamphetamine XR (Adderall XR) 20 mg 24 hr capsule    Irritability and anger    Relevant Medications    ARIPiprazole (Abilify) 20 mg tablet          Dx: MDD, KAROL, ADHD, r/o bipolar 2 d/o  CONTINUE Klonopin 0.5 mg daily prn for panic attacks (15 tabs more issued) (temporal use-haven't used for a while, 5 tabs  left)  INCREASE Adderall XR 20 mg BID (2 refills issued until 05/18) - try goodrx coupon (may switch to Concerta 27 mg if neccessary)  INCREASE Abilify 20 mg daily   HOLD Lamotrigine 100 mg daily  INCREASE Viibryd 40 mg daily starting 4/17/2025     Reviewed r/b/a, possible side effects of the medication. Client is aware about the benefit outweighs the risk.     4. INDIVIDUAL THERAPY: weekly counseling with Dr. Yang Marquez since a month ago, missed 2 appts     5. OARRS: I have personally reviewed the OARRS report for Glenroy BRAY Keith. I have considered the risks of abuse, dependence, addiction and diversion. Last filled Adderall ER 15 mg on 04/01/2025 (60 caps x 30 days), Klonopin 0.5 mg on 11/04/2024  (15 tabs x 15 days), Oxycodone 5 mg on 03/05/2024 (20 tabs x5 days), Ativan 1 mg 06/10/2024 (10 tabs x 10 days)     6. Labs reviewed    7. Last Urine Drug Screen:   Date of Last Screen: 7/22/2024     8. Controlled Substance Agreement:   Date of the Last Agreement:  CSA (benzo) 6/10/2024 , CSA (stimulants): 7/22/2024      9. Follow-up with this provider in 4 weeks.     10. -Total time: 21 minutes via virtual     - Follow up with physical health providers as scheduled  - May follow up sooner if experiences worsening symptoms by calling  Psychiatry at [phone]  - Patient verbalized an understanding to call Mobile Crisis at [phone] ([facility]), 211, or 721/go to the nearest emergency room if experiences thoughts of harm to self or others.

## 2025-05-09 ENCOUNTER — APPOINTMENT (OUTPATIENT)
Dept: BEHAVIORAL HEALTH | Facility: CLINIC | Age: 40
End: 2025-05-09
Payer: COMMERCIAL

## 2025-05-09 DIAGNOSIS — F41.1 GAD (GENERALIZED ANXIETY DISORDER): ICD-10-CM

## 2025-05-09 DIAGNOSIS — F90.0 ATTENTION DEFICIT HYPERACTIVITY DISORDER (ADHD), PREDOMINANTLY INATTENTIVE TYPE: ICD-10-CM

## 2025-05-09 DIAGNOSIS — F34.1 PERSISTENT DEPRESSIVE DISORDER: ICD-10-CM

## 2025-05-09 PROCEDURE — 90837 PSYTX W PT 60 MINUTES: CPT | Performed by: PSYCHOLOGIST

## 2025-05-09 PROCEDURE — 4010F ACE/ARB THERAPY RXD/TAKEN: CPT | Performed by: PSYCHOLOGIST

## 2025-05-09 NOTE — PROGRESS NOTES
Dictated with Dragon [employer] One  software:  10 AM [address] Indiv Psych for Persistent Depressive Dx and KAROL and ADHD (60 min, 8212-2461). In-person. Supportive/CBT.  Patient has had some recent medication changes.  His Adderall and Abilify were increased.  Discontinued Lamictal for now.  Reported depression has improved somewhat.  Patient continues to have problems from herniated disc and hopes to get another MRI at some point.  However, needs to go through physical therapy first which she has already started.  Is considering an ablation and also possibly another epidural injection.  Went on honeymoon with wife to [place] had a very good time.  Wife is expecting a baby on August 24.  They note they are having a boy INR currently getting the nursery in order.  Patient discussed an upsetting dream last night that involved his father.  We discussed other ways to improve sleep including reducing his caffeine consumption.  He reported he drinks 4 to 5 cups of coffee per day.  Patient smoked a few cigarettes while in [place] but has not had cigarettes since April 22 when he returned.  Wants to continue to improve on this.  Discussed the importance of him focusing on his health goals including monitoring his blood pressure and taking glucose readings consistently.  Needs to order a new finger prick so that he can get his levels and also has already ordered a blood pressure monitor.  In order to move forward with knee surgery he needs to get his A1C under control and has a next appointment scheduled in July.  Continues to be on medications for diabetes/weight loss.  His mom is doing better at home and may no longer need oxygen at some point.  Patient is no longer in trouble at work and his productivity has improved.  Next: 1 month.

## 2025-05-12 ENCOUNTER — PATIENT MESSAGE (OUTPATIENT)
Dept: ENDOCRINOLOGY | Facility: CLINIC | Age: 40
End: 2025-05-12
Payer: COMMERCIAL

## 2025-05-12 DIAGNOSIS — Z79.4 TYPE 2 DIABETES MELLITUS WITH HYPERGLYCEMIA, WITH LONG-TERM CURRENT USE OF INSULIN: ICD-10-CM

## 2025-05-12 DIAGNOSIS — F90.0 ATTENTION DEFICIT HYPERACTIVITY DISORDER (ADHD), PREDOMINANTLY INATTENTIVE TYPE: ICD-10-CM

## 2025-05-12 DIAGNOSIS — E11.65 TYPE 2 DIABETES MELLITUS WITH HYPERGLYCEMIA, WITH LONG-TERM CURRENT USE OF INSULIN: ICD-10-CM

## 2025-05-12 RX ORDER — DEXTROAMPHETAMINE SACCHARATE, AMPHETAMINE ASPARTATE MONOHYDRATE, DEXTROAMPHETAMINE SULFATE AND AMPHETAMINE SULFATE 5; 5; 5; 5 MG/1; MG/1; MG/1; MG/1
20 CAPSULE, EXTENDED RELEASE ORAL
Qty: 60 CAPSULE | Refills: 0 | Status: SHIPPED | OUTPATIENT
Start: 2025-05-12 | End: 2025-06-11

## 2025-05-13 VITALS — SYSTOLIC BLOOD PRESSURE: 126 MMHG | DIASTOLIC BLOOD PRESSURE: 84 MMHG | HEART RATE: 112 BPM

## 2025-05-14 ENCOUNTER — TREATMENT (OUTPATIENT)
Dept: PHYSICAL THERAPY | Facility: CLINIC | Age: 40
End: 2025-05-14
Payer: COMMERCIAL

## 2025-05-14 DIAGNOSIS — M54.16 LUMBAR RADICULOPATHY: Primary | ICD-10-CM

## 2025-05-14 DIAGNOSIS — M54.2 MYOFASCIAL NECK PAIN: ICD-10-CM

## 2025-05-14 PROCEDURE — 97110 THERAPEUTIC EXERCISES: CPT | Mod: GP | Performed by: PHYSICAL THERAPIST

## 2025-05-14 NOTE — PROGRESS NOTES
"PHYSICAL THERAPY   TREATMENT NOTE    Patient Name:  Glenroy Parker \"RJ\"  Patient MRN: [id number]  Date: 5/14/2025    Time Calculation  Start Time: 0917  Stop Time: 1000  Time Calculation (min): 43 min    Insurance:  Insurance Type: [place]/[employer]  Visit number: 10  [place]ved [place] of visits [place]  Authorization Needed: No    General   Reason for visit: LBP   Referred by: Molly Joseph    Therapy diagnoses:   Problem List Items Addressed This Visit           ICD-10-CM    Lumbar radiculopathy - Primary M54.16    Myofascial neck pain M54.2       ASSESSMENT   Pt has good TA isolation today with minimal cues for maintaining breath throughout.  Pt challenged by gluteal strengthening today, achieving good muscle fatigue without c/o pain.      PLAN   Continue to work on core and hip strength/stabilization for lumbar symptoms   continue with cervical POC     SUBJECTIVE   Pt states his low back pain finally feels better after last visit.  Feels like the tennis ball in his back may have aggravated him and the traction didn't seem to help at all.  Coming in today pain is in left side of back and into buttock.  Neck feels okay.     Pt states he wasn't able to do HEP daily due to pain except bridge which gives him relief.     Pain: 5/10 left side of low back and buttock       Precautions:    PMH:  KELLY, gallstone pancreatitis, HTN, Obesity, depression, DM with neuropathy, smoker, herniated disc in lumbar spine, R meniscus tear   Fall risk -  none      OBJECTIVE   Access Code: [email or web address]  URL: [email or web address]  Date: 05/14/2025  Prepared by: Christina Fortune         Therapeutic Exercise:  - Nustep L 4 8 minutes   - child's pose at barre 30\" x 2  - HC stretch 30\" x 2  - Supine Lower Trunk Rotation -review only   - bridge 5\" x 10  - HL TA 5\" x 10   - HL TA BKFO 10x R/L  - HL TA small marching 10x R/L   - side clams 5\" x 10 R/L   - side SLR with TA   - Seated Figure 4 Piriformis Stretch          PT Therapeutic " I left message to call [facility] stating no later then 4:00.                  Procedures Time Entry  Therapeutic Exercise Time Entry: 43

## 2025-05-15 RX ORDER — LANCETS 33 GAUGE
EACH MISCELLANEOUS
Qty: 100 EACH | Refills: 1 | Status: SHIPPED | OUTPATIENT
Start: 2025-05-15

## 2025-05-15 RX ORDER — DEXTROSE 4 G
TABLET,CHEWABLE ORAL
Qty: 1 EACH | Refills: 0 | Status: SHIPPED | OUTPATIENT
Start: 2025-05-15

## 2025-05-15 RX ORDER — IBUPROFEN 200 MG
CAPSULE ORAL
Qty: 100 EACH | Refills: 1 | Status: SHIPPED | OUTPATIENT
Start: 2025-05-15

## 2025-05-18 DIAGNOSIS — H66.91 ACUTE BACTERIAL INFECTION OF RIGHT MIDDLE EAR: Primary | ICD-10-CM

## 2025-05-18 RX ORDER — CIPROFLOXACIN AND DEXAMETHASONE 3; 1 MG/ML; MG/ML
4 SUSPENSION/ DROPS AURICULAR (OTIC) 2 TIMES DAILY
Qty: 7.5 ML | Refills: 0 | Status: SHIPPED | OUTPATIENT
Start: 2025-05-18 | End: 2025-05-25

## 2025-05-21 ENCOUNTER — APPOINTMENT (OUTPATIENT)
Dept: PODIATRY | Facility: CLINIC | Age: 40
End: 2025-05-21
Payer: COMMERCIAL

## 2025-05-21 ENCOUNTER — TREATMENT (OUTPATIENT)
Dept: PHYSICAL THERAPY | Facility: CLINIC | Age: 40
End: 2025-05-21
Payer: COMMERCIAL

## 2025-05-21 DIAGNOSIS — M54.2 MYOFASCIAL NECK PAIN: ICD-10-CM

## 2025-05-21 PROCEDURE — 97110 THERAPEUTIC EXERCISES: CPT | Mod: GP | Performed by: PHYSICAL THERAPIST

## 2025-05-21 NOTE — PROGRESS NOTES
"PHYSICAL THERAPY   TREATMENT NOTE    Patient Name:  Glenroy Parker \"RJ\"  Patient MRN: [id number]  Date: 5/21/2025         Insurance:  Insurance Type: [place]/Highmark  Visit number: 11  Approved # of visits [place]  Authorization Needed: No    General   Reason for visit: LBP   Referred by: Molly Joseph    Therapy diagnoses:   Problem List Items Addressed This Visit           ICD-10-CM    Myofascial neck pain M54.2       ASSESSMENT   Pt has decreased pain during session especially left hip pain.  Pt was able to progress TA exercises from TA marching to supine SLR without c/o pain.  Continued to focus on low back today rather than neck since pt feels neck pain is under control right now.      PLAN   Continue to work on core and hip strength/stabilization for lumbar symptoms   continue with cervical POC     SUBJECTIVE   Pt states he is feeling more pain than normal because of doing yard work and concert on Monday.  Had to alternate between standing and sitting on the grass for over 5 hours.  Neck has been feeling pretty good except for one day with looking at multiple screens.      Pain: 7/10 left side of low back and buttock       Precautions:    PMH:  KELLY, gallstone pancreatitis, HTN, Obesity, depression, DM with neuropathy, smoker, herniated disc in lumbar spine, R meniscus tear   Fall risk -  none      OBJECTIVE   Access Code: [email or web address]  URL: [email or web address]  Date: 05/14/2025  Prepared by: Christina Fortune         Therapeutic Exercise:  - Nustep L 4 10 minutes   - HC stretch 30\" x 2  - dynamic at barre with TA focus: marching, butt kick, side stepping   - child's pose at barre 30\" x 2  - Supine Lower Trunk Rotation 10x R/L   - bridge 5\" x 10  - HL TA 5\" x review   - HL TA BKFO 10x R/L  - HL TA small marching 10x R/L   - HL TA SLR 10x R/L  - side clams 5\" x 10 R/L   - side SLR with TA x 10 R/L  - Seated Figure 4 Piriformis Stretch 30\" x 2 R/L                             "

## 2025-05-28 ENCOUNTER — OFFICE VISIT (OUTPATIENT)
Dept: PODIATRY | Facility: CLINIC | Age: 40
End: 2025-05-28
Payer: COMMERCIAL

## 2025-05-28 DIAGNOSIS — M79.671 PAIN IN BOTH FEET: ICD-10-CM

## 2025-05-28 DIAGNOSIS — B35.3 TINEA PEDIS OF BOTH FEET: ICD-10-CM

## 2025-05-28 DIAGNOSIS — L84 CORNS AND CALLOSITIES: ICD-10-CM

## 2025-05-28 DIAGNOSIS — B35.1 ONYCHOMYCOSIS: Primary | ICD-10-CM

## 2025-05-28 DIAGNOSIS — E11.49 TYPE II DIABETES MELLITUS WITH NEUROLOGICAL MANIFESTATIONS (MULTI): ICD-10-CM

## 2025-05-28 DIAGNOSIS — M79.672 PAIN IN BOTH FEET: ICD-10-CM

## 2025-05-28 PROCEDURE — 11055 PARING/CUTG B9 HYPRKER LES 1: CPT | Performed by: PODIATRIST

## 2025-05-28 PROCEDURE — 99214 OFFICE O/P EST MOD 30 MIN: CPT | Mod: 25 | Performed by: PODIATRIST

## 2025-05-28 PROCEDURE — 11055 PARING/CUTG B9 HYPRKER LES 1: CPT | Mod: 59 | Performed by: PODIATRIST

## 2025-05-28 PROCEDURE — 4010F ACE/ARB THERAPY RXD/TAKEN: CPT | Performed by: PODIATRIST

## 2025-05-28 PROCEDURE — 99204 OFFICE O/P NEW MOD 45 MIN: CPT | Performed by: PODIATRIST

## 2025-05-28 NOTE — PROGRESS NOTES
Chief Complaint   Patient presents with    Toenail Problem     TOE NAIL FUNGUS     HPI:C/O fungal painful toenails x months.  R hallux nail turned black 1 month ago.  No treatments, no drainage noted.  Glucose is high, but improved from previous tests. Ha1c Trending down from 17 to 8.7. Awaiting knee surgery, needs glucose better controlled.  Patient has a history of peripheral neuropathy.  Patient is also complaining painful callus on the left foot which he periodically pares down. H/O CVI with multiple surgeries.  Patient states he used to weigh over 600 pounds.  Patient has worn compression stockings in the past.    PMH, PSx, Medications and allergies reviewed.  ROS negative except for what is stated in HPI.    Physical Exam  Patient alert, oriented, no acute distress    VASC: +2/4 pedal pulses B/L.  CFT brisk all digits.  Skin temperature is warm to warm proximal to distal B/L.  (+)hair growth B/L.   Mild/moderate LE edema B/L and multiple varicosities.  No weeping, no bulla noted B/L    NEURO: Vibratory diminished 1st MPJ B/L, intact medial malleoli B/L.  Light touch intact B/L.   5.07 [place] monofilament absent on the digits and plantar metatarsal heads B/L, intact mid and hindfoot B/L.    DERM:Nails 1,2,3,4,5 R and 1,2,3,4,5 L are thickened, discolored, crumbly, painful and have subungual debris.  Subungual hematoma noted of the right hallux nail, nail is firmly attached.  No cellulitis noted. Pre-ulcerative, painful, hyperkeratotic lesion located: Plantar left forefoot near the fourth metatarsal head 1 cm in size.  No ulcerations noted.  Dry erythematous scaly skin moccasin distribution bilateral foot.  No weeping noted.  Hemosiderin deposits noted on the lower legs consistent with chronic venous insufficiency bilaterally.     MUSCULOSKEL: +5/5 muscle strength B/L.    Decreased ankle joint ROM B/L.    Lab Results   Component Value Date    HGBA1C 8.7 (H) 04/09/2025      Assessment and Plan  #1   Onychomycosis, subungual hematoma right hallux  Discussed pathology and treatment options  Rx: ALT, AST, p.o. Lamisil pending results  Monitor outgrowth of hematoma on the right hallux nail  Follow-up 3 months    #2 tinea pedis  Discussed pathology and treatment options  This will be treated with the oral Lamisil  Discussed proper hygiene for socks shoes and bathroom floors  Wash socks with a bleach product or Lysol   Disinfect all shoes  Use bleach or Lysol in shower floors  Follow-up 3 months    #3 DM with peripheral neuropathy  Discussed pathology and treatment options  Hgb A1c equal to 8.7  Continue to control glucose  Daily foot evaluations  Avoid barefoot walking  Diabetic footcare handout dispensed  Follow-up yearly    #4 callus left foot  Discussed pathology and treatment options  Paring of all hyperkeratotic tissue with a 15 blade without complications  Patient can use a pumice stone or nail file as needed  Follow-up as needed

## 2025-05-29 DIAGNOSIS — B35.1 ONYCHOMYCOSIS: Primary | ICD-10-CM

## 2025-05-29 LAB
ALT SERPL-CCNC: 40 U/L (ref 9–46)
AST SERPL-CCNC: 21 U/L (ref 10–40)

## 2025-05-29 RX ORDER — TERBINAFINE HYDROCHLORIDE 250 MG/1
250 TABLET ORAL DAILY
Qty: 90 TABLET | Refills: 0 | Status: SHIPPED | OUTPATIENT
Start: 2025-05-29 | End: 2025-08-27

## 2025-05-29 NOTE — PROGRESS NOTES
Rx: ALT, AST to be repeated in 6 weeks  Rx: Terbinafine 1 tablet daily x 90 days  Follow-up in office in 30 days  Reviewed ALT and AST which are within normal limits.

## 2025-05-31 DIAGNOSIS — F41.1 GAD (GENERALIZED ANXIETY DISORDER): ICD-10-CM

## 2025-05-31 DIAGNOSIS — R45.4 IRRITABILITY AND ANGER: ICD-10-CM

## 2025-05-31 DIAGNOSIS — F34.1 PERSISTENT DEPRESSIVE DISORDER: ICD-10-CM

## 2025-06-02 RX ORDER — ARIPIPRAZOLE 20 MG/1
20 TABLET ORAL DAILY
Qty: 90 TABLET | Refills: 1 | Status: SHIPPED | OUTPATIENT
Start: 2025-06-02

## 2025-06-02 RX ORDER — VILAZODONE HYDROCHLORIDE 40 MG/1
40 TABLET ORAL DAILY
Qty: 90 TABLET | Refills: 1 | Status: SHIPPED | OUTPATIENT
Start: 2025-06-02

## 2025-06-04 ENCOUNTER — TREATMENT (OUTPATIENT)
Dept: PHYSICAL THERAPY | Facility: CLINIC | Age: 40
End: 2025-06-04
Payer: COMMERCIAL

## 2025-06-04 DIAGNOSIS — M54.2 MYOFASCIAL NECK PAIN: Primary | ICD-10-CM

## 2025-06-04 DIAGNOSIS — M54.16 LUMBAR RADICULOPATHY: ICD-10-CM

## 2025-06-04 PROCEDURE — 97110 THERAPEUTIC EXERCISES: CPT | Mod: GP | Performed by: PHYSICAL THERAPIST

## 2025-06-04 PROCEDURE — 97140 MANUAL THERAPY 1/> REGIONS: CPT | Mod: GP | Performed by: PHYSICAL THERAPIST

## 2025-06-04 NOTE — PROGRESS NOTES
"PHYSICAL THERAPY   TREATMENT NOTE/RECHECK    Patient Name:  Glenroy Parker \"RJ\"  Patient MRN: [id number]  Date: 6/4/2025    Time Calculation  Start Time: 1102  Stop Time: 1140  Time Calculation (min): 38 min    Insurance:  Insurance Type: [employer]  Visit number: 12  Approved # of visits [place]  Authorization Needed: No    General   Reason for visit: LBP   Referred by: Molly Joseph    Therapy diagnoses:   Problem List Items Addressed This Visit           ICD-10-CM    Myofascial neck pain - Primary M54.2       ASSESSMENT   Pt's pain has not changed at all, nor has his function since starting PT for his low back a month ago. His mobility has shown some slight improvements in LE flexibility and spinal mobility but he does continue to be tight in quads and hip flexors, as well as resting in thoracic kyphosis and despite this, his back pain continues to be crippling at times where he is unable to do anything.  He would benefit from further medical management to reduce pain and/or lumbar MRI to determine next steps to move towards prior level of function.   Notes 90% improvement of cervical symptoms today with manual work. Lumbar pain unchanged.    PLAN   Refer back to orthopedic. Continue with POC as necessary.  Goals  1. Pt will be independent in HEP in 6 weeks - PARTIALLY MET  2. Pt will report 0-4/10 low back pain at rest and with activity in 6 weeks. - NOT MET  3. Pt will demonstrate 5/5 B LE strength to return to lifting heavy household items in 6 weeks - MET STRENGTH/ STILL AVOIDING LIFTING WHEN POSSIBLE  4. Pt will demonstrate full lumbar ROM to improve functional ADLS in 6 weeks. - PROGRESSING ROM  5. Pt will report LOPEZ score < 25% in 6 weeks. - NOT MET      SUBJECTIVE   Neck feels good overall but Monday he woke up with severe pain in his neck on the R side that's shooting down R side of his chest and shoulder blade. Denies N/T. Went to chiropractor yesterday but it didn't help. Low back hasn't changed much " "since starting PT - gets temporary relief from the HEP. Tries to do his stretches at work and before bed. Low back pain gets to be crippling at times. He is sleeping in another room to try to avoid waking up his wife.   Pain: 5-6/10 R side of neck with rotation, 6/10 LBP current, 5-10 on any given day with low back pain    Precautions:    PMH:  KELLY, gallstone pancreatitis, HTN, Obesity, depression, DM with neuropathy, smoker, herniated disc in lumbar spine, R meniscus tear   Fall risk -  none    OBJECTIVE IE of LBP // TODAY  Access Code: [email or web address]   Range of Motion (R, L in degrees)  Lumbar Flexion fingers to ankles with tingling  // unchanged  Lumbar Extension WNL with tingling //WNL*  Lumbar Sidebending WNL*, WNL* //WNL*, WNL  Lumbar Rotation WNL, mod dec* // WNL, min dec*     Flexibility (R, L)  Hamstrings min dec, max dec* // min dec, min dec  Quads mod dec, mod dec // mod dec, mod dec    Strength  Upper Abs NT // 4+*    Outcome Measures  LOPEZ = 50% //48%      Therapeutic Exercise:  - Nustep L4 x 8'  - child's pose at barre x 30\" x 2  - recheck    Manual Therapy  STM/DTM R cervical paraspinals, UT, LS, supraspinatus, rhomboid, mid trap  Suboccipital release         PT Therapeutic Procedures Time Entry  Manual Therapy Time Entry: 15  Therapeutic Exercise Time Entry: 23                  "

## 2025-06-05 ENCOUNTER — APPOINTMENT (OUTPATIENT)
Dept: BEHAVIORAL HEALTH | Facility: CLINIC | Age: 40
End: 2025-06-05
Payer: COMMERCIAL

## 2025-06-05 DIAGNOSIS — G47.9 SLEEP DIFFICULTIES: ICD-10-CM

## 2025-06-05 DIAGNOSIS — F90.0 ATTENTION DEFICIT HYPERACTIVITY DISORDER (ADHD), PREDOMINANTLY INATTENTIVE TYPE: ICD-10-CM

## 2025-06-05 DIAGNOSIS — F41.0 PANIC DISORDER: ICD-10-CM

## 2025-06-05 DIAGNOSIS — F41.1 GAD (GENERALIZED ANXIETY DISORDER): ICD-10-CM

## 2025-06-05 DIAGNOSIS — R45.4 IRRITABILITY AND ANGER: ICD-10-CM

## 2025-06-05 DIAGNOSIS — F34.1 PERSISTENT DEPRESSIVE DISORDER: ICD-10-CM

## 2025-06-05 PROCEDURE — 99214 OFFICE O/P EST MOD 30 MIN: CPT

## 2025-06-05 PROCEDURE — 4010F ACE/ARB THERAPY RXD/TAKEN: CPT

## 2025-06-05 RX ORDER — VENLAFAXINE HYDROCHLORIDE 37.5 MG/1
CAPSULE, EXTENDED RELEASE ORAL
Qty: 67 CAPSULE | Refills: 0 | Status: SHIPPED | OUTPATIENT
Start: 2025-06-06 | End: 2025-07-13

## 2025-06-05 RX ORDER — DEXTROAMPHETAMINE SACCHARATE, AMPHETAMINE ASPARTATE MONOHYDRATE, DEXTROAMPHETAMINE SULFATE AND AMPHETAMINE SULFATE 5; 5; 5; 5 MG/1; MG/1; MG/1; MG/1
20 CAPSULE, EXTENDED RELEASE ORAL
Qty: 60 CAPSULE | Refills: 0 | Status: SHIPPED | OUTPATIENT
Start: 2025-06-11 | End: 2025-07-11

## 2025-06-05 NOTE — PROGRESS NOTES
"Glenroy Parker is a 39 y.o. male patient presenting for a(an) virtual FUV  Visit location: patient (Glenroy Parker,  [place] [facility]), provider (DARRICK Krishna,  [place] office)  Pt identify self by name, , and address    Chief Complaint   Patient presents with    ADHD    Irritability    Panic Attack    Anxiety    Depression    Follow-up    Med Management     HPI    2025  Recently checked vitals: 126/89, HR 90  \"Manage patient was with some really bad despite increasing my Viibryd to 40 mg daily.  Attention/focus/concentration-improved since increasing dose of Adderall XR 20 mg twice daily  Denies panic attacks or mood swings-has not needed Klonopin since last visit   Denies any noticeable side effects from medications  Sleep and appetite are good  Denies SI/HI/AVH/delusions/maki/hypomania    Current S/Sx:  -Mood swings: stable,   -Depressive mood: see PHQ-9 (7)  -Fatigue/Energy: nearly daily  -Feeling hope/help/worthless: yes  -Sleep: sleeps 3-5yrs per night.   -Motivation: low  -Appetite/Weight Changes: increased appetite, gained about 30 lbs in the past 6 months  -Psychosis: denies hallucinations/delusions  -SI/HI: Denies current suicidal intent/plan  -Guns/Weapons at home: guns in the house, locked up in a safe place     -Worry excessively: Mostly managed  -PHQ 7 (4)     Panic attacks  Denies recent intense panic attacks     HISTORY  PSYCH HISTORY  -Psych Hx: KAROL, MDD  -Psych Hospitalization Hx: denies  -Suicide Attempt Hx: tried to hang self as a teenager x1 (\"realized it was stupid.\")  -Self-Harm/Violence Hx: denies  -Current psych meds: Wellbutrin  mg daily, Hydroxyzine 25 mg BID as needed (causes drowsiness)  -Psych Med Hx: Wellbutrin  mg daily, Hydroxyzine 25 mg as needed, Abilify 5 mg daily (didn't feel any different), Lamotrigine 200 mg daily (didn't feel any different), Mirtazapine 7.5 mg at bedtime, Klonopin 1 mg (didn't help), Trazodone 50 mg (ineffective)   "   SUBSTANCE USE HISTORY  -Substance Use Hx: denies  -ETOH: occasionally  -Tobacco: about 1 ppd since 16 - 17 yrs now  -Caffeine: 2-5 cups coffee/day depending of level of fatigue  -Substance Abuse Treatment Hx: denies     FAMILY HISTORY  -Family Psych Hx: depression  -Family Suicide Hx: denies  -Family Substance Abuse Hx: dad: alcoholic     SOCIAL HISTORY  -Upbringing: Grew up with both parents. Has 1 brother  -Support system: good  -Trauma: emotional  -Education: some college  -Work: [employer]  -Marital Status: single, fiance  -Children: none  -Living situation: lives in house with fiance  -: denies  -Legal: denies      MEDICAL HISTORY  -PCP: Brian Hoang DO  -TBI/head trauma/LOC/seizure hx: 4-5 concoctions, several LOC     REVIEW OF SYSTEMS  Review of Systems   All other systems reviewed and are negative.       PHYSICAL EXAM  Physical Exam  Psychiatric:         Attention and Perception: Attention and perception normal.         Mood and Affect: Mood and affect normal.         Speech: Speech normal.         Behavior: Behavior normal. Behavior is cooperative.         Thought Content: Thought content normal.         Cognition and Memory: Cognition and memory normal.          IMPRESSION  FUV today via virtual. Patient reports worsening depressive symptoms since increasing Viibryd to 40 mg daily and Abilify 20 mg daily.  Attention/focus/concentration is improved on current dose of Adderall XR 20 mg twice daily.  Denies any noticeable side effects of medication.  Denies substance use.  Denies SI/HI/AVH/delusions/maki/hypomania.  Discussed to continue Adderall XR 20 mg twice daily as before, decrease Abilify 15 mg daily for irritable mood, bridge from Viibryd 40 mg to Effexor as slowly titrate up to more effective manage anxiety and depression.  Will follow up with this provider in 2 weeks to continue monitoring, or sooner if needed.      Plan:     1. Reviewed diagnostic impression including  subjective and objective data and provided education about Panic attacks, MDD, KAROL, bipolar disorder, and ADHD, etiology, treatment recommendations including medication, therapy, course of treatment and prognosis. Patient amenable to treatment plan.     2. Safety Assessment: History of no thoughts, but denies current thoughts of SI, plan/intent.  No h/o SA. RF include male, unmarried/single, prior suicide attempt(s), chronic medical illness, access to weapons, and panic attacks. PF include strong coping skills, social support/connectedness, moral objections to suicide, hopefulness/future orientation, marriage/partnership, and employment, engaged in care, future oriented, no guns.  Low imminent risk     3. @  Problem List Items Addressed This Visit       Panic disorder    Sleep difficulties    Persistent depressive disorder [F34.1]    Relevant Medications    venlafaxine XR (Effexor XR) 37.5 mg 24 hr capsule (Start on 6/6/2025)    KAROL (generalized anxiety disorder) [F41.1]    Relevant Medications    venlafaxine XR (Effexor XR) 37.5 mg 24 hr capsule (Start on 6/6/2025)    Attention deficit hyperactivity disorder (ADHD)    Irritability and anger     Dx: MDD, KAROL, ADHD, r/o bipolar 2 d/o  CONTINUE Klonopin 0.5 mg daily prn for panic attacks (15 tabs more issued)   CONTINUE Adderall XR 20 mg BID (issued until 7/11/2025) - try goodrx coupon (may switch to Concerta 27 mg if neccessary)  DECREASE Abilify 15 mg daily   DECREASE Viibryd 20 mg daily x3 days, then STOP  START Effexor XR 37.5 mg, take 1 caps (37.5 mg) x 7 days, then 2 caps (75 mg) until next starting 6/8/25     Reviewed r/b/a, possible side effects of the medication. Client is aware about the benefit outweighs the risk.     4. INDIVIDUAL THERAPY: weekly counseling with Dr. Yang Marquez since a month ago, missed 2 appts     5. OARRS: I have personally reviewed the OARRS report for Glenroy KATARINA Parker. I have considered the risks of abuse, dependence, addiction and  diversion. Last filled Adderall ER 20 mg on 05/09/2025  (60 caps x 30 days), Klonopin 0.5 mg on 11/04/2024  (15 tabs x 15 days), Oxycodone 5 mg on 03/05/2024 (20 tabs x5 days), Ativan 1 mg 06/10/2024 (10 tabs x 10 days)     6. Labs reviewed    7. Last Urine Drug Screen:   Date of Last Screen: 7/22/2024     8. Controlled Substance Agreement:   Date of the Last Agreement:  CSA (benzo) 6/10/2024 , CSA (stimulants): 7/22/2024      9. Follow-up with this provider in 2 weeks.     10. -Total time: 23 minutes via virtual     - Follow up with physical health providers as scheduled  - May follow up sooner if experiences worsening symptoms by calling  Psychiatry at [phone]  - Patient verbalized an understanding to call Mobile Crisis at [phone] ([facility]), [phone], or 030/go to the nearest emergency room if experiences thoughts of harm to self or others.

## 2025-06-11 ENCOUNTER — DOCUMENTATION (OUTPATIENT)
Dept: PHYSICAL THERAPY | Facility: CLINIC | Age: 40
End: 2025-06-11
Payer: COMMERCIAL

## 2025-06-11 ENCOUNTER — APPOINTMENT (OUTPATIENT)
Dept: PHYSICAL THERAPY | Facility: CLINIC | Age: 40
End: 2025-06-11
Payer: COMMERCIAL

## 2025-06-11 NOTE — PROGRESS NOTES
Physical Therapy                 Therapy Communication Note    Patient Name: Glenroy Parker  MRN: [id number]  Department:   Room: Room/bed info not found  Today's Date: 6/11/2025     Discipline: Physical Therapy    Missed Time: Cancel    Comment:  Via [employer]

## 2025-06-13 ENCOUNTER — APPOINTMENT (OUTPATIENT)
Dept: BEHAVIORAL HEALTH | Facility: CLINIC | Age: 40
End: 2025-06-13
Payer: COMMERCIAL

## 2025-06-13 DIAGNOSIS — F41.1 GAD (GENERALIZED ANXIETY DISORDER): ICD-10-CM

## 2025-06-13 DIAGNOSIS — F90.0 ATTENTION DEFICIT HYPERACTIVITY DISORDER (ADHD), PREDOMINANTLY INATTENTIVE TYPE: ICD-10-CM

## 2025-06-13 DIAGNOSIS — F34.1 PERSISTENT DEPRESSIVE DISORDER: ICD-10-CM

## 2025-06-13 PROCEDURE — 4010F ACE/ARB THERAPY RXD/TAKEN: CPT | Performed by: PSYCHOLOGIST

## 2025-06-13 PROCEDURE — 90834 PSYTX W PT 45 MINUTES: CPT | Performed by: PSYCHOLOGIST

## 2025-06-14 NOTE — PROGRESS NOTES
"Dictated with Dragon Medical One  software:  11 AM [id number] Indiv Psych for Persistent Depressive Dx and KAROL and ADHD (45 min, 2108-2320). In-person. Supportive/CBT.    Patient reported period of being depressed and attributed this to the Viibryd which she had been on for 3 weeks.  Since he has discontinued this he is felt better.  Currently on Effexor, Abilify and Adderall.  Prescribed Klonopin as needed but has not taken that too much.  Still bothered by the fact that \"small things get to me.\"  Preparing for the baby and his wife's due date is August 24.  Some anxiety about whether or not he will be a good father.  His work productivity has improved significantly and he will be getting switched to the [employer] from 7-3 30 as of July 7.  Continues to have knee problems as well as back pain.  Will have an MRI on his back on June 18.  He is trying to get his A1c levels under control so that he can have surgery on his knee.  Is trying to watch portion control.  Has daily contact with his mother and at times feels bad when she cries and is lonely, missing his father.  Is a little better at setting limits with her and she wants him to come over and do work activities like yard work. Next: 1 month.  "

## 2025-06-17 ENCOUNTER — APPOINTMENT (OUTPATIENT)
Dept: RADIOLOGY | Facility: CLINIC | Age: 40
End: 2025-06-17
Payer: COMMERCIAL

## 2025-06-17 DIAGNOSIS — M54.16 LUMBAR RADICULOPATHY: ICD-10-CM

## 2025-06-17 PROCEDURE — 72148 MRI LUMBAR SPINE W/O DYE: CPT | Performed by: RADIOLOGY

## 2025-06-17 PROCEDURE — 72148 MRI LUMBAR SPINE W/O DYE: CPT

## 2025-06-18 ENCOUNTER — DOCUMENTATION (OUTPATIENT)
Dept: PHYSICAL THERAPY | Facility: CLINIC | Age: 40
End: 2025-06-18
Payer: COMMERCIAL

## 2025-06-18 ENCOUNTER — APPOINTMENT (OUTPATIENT)
Dept: PHYSICAL THERAPY | Facility: CLINIC | Age: 40
End: 2025-06-18
Payer: COMMERCIAL

## 2025-06-18 NOTE — PROGRESS NOTES
Physical Therapy                 Therapy Communication Note    Patient Name: Glenroy Parker  MRN: [id number]  Department:   Room: Room/bed info not found  Today's Date: 6/18/2025     Discipline: Physical Therapy      Missed Time: Cancel    Comment: nhan via MPV[employer]

## 2025-06-19 ENCOUNTER — APPOINTMENT (OUTPATIENT)
Dept: BEHAVIORAL HEALTH | Facility: CLINIC | Age: 40
End: 2025-06-19
Payer: COMMERCIAL

## 2025-06-19 DIAGNOSIS — R45.4 IRRITABILITY AND ANGER: ICD-10-CM

## 2025-06-19 DIAGNOSIS — F34.1 PERSISTENT DEPRESSIVE DISORDER: ICD-10-CM

## 2025-06-19 DIAGNOSIS — G47.9 SLEEP DIFFICULTIES: ICD-10-CM

## 2025-06-19 DIAGNOSIS — F90.0 ATTENTION DEFICIT HYPERACTIVITY DISORDER (ADHD), PREDOMINANTLY INATTENTIVE TYPE: ICD-10-CM

## 2025-06-19 DIAGNOSIS — F41.0 PANIC DISORDER: ICD-10-CM

## 2025-06-19 DIAGNOSIS — Z91.89 COMPLIANCE WITH MEDICATION REGIMEN: ICD-10-CM

## 2025-06-19 DIAGNOSIS — F41.1 GAD (GENERALIZED ANXIETY DISORDER): ICD-10-CM

## 2025-06-19 PROCEDURE — 4010F ACE/ARB THERAPY RXD/TAKEN: CPT

## 2025-06-19 PROCEDURE — 99214 OFFICE O/P EST MOD 30 MIN: CPT

## 2025-06-19 RX ORDER — VENLAFAXINE HYDROCHLORIDE 150 MG/1
150 CAPSULE, EXTENDED RELEASE ORAL DAILY
Qty: 30 CAPSULE | Refills: 1 | Status: SHIPPED | OUTPATIENT
Start: 2025-06-19 | End: 2025-08-18

## 2025-06-19 RX ORDER — DEXTROAMPHETAMINE SACCHARATE, AMPHETAMINE ASPARTATE MONOHYDRATE, DEXTROAMPHETAMINE SULFATE AND AMPHETAMINE SULFATE 5; 5; 5; 5 MG/1; MG/1; MG/1; MG/1
20 CAPSULE, EXTENDED RELEASE ORAL
Qty: 60 CAPSULE | Refills: 0 | Status: SHIPPED | OUTPATIENT
Start: 2025-07-11 | End: 2025-08-10

## 2025-06-19 RX ORDER — ARIPIPRAZOLE 15 MG/1
15 TABLET ORAL DAILY
Qty: 90 TABLET | Refills: 1 | Status: SHIPPED | OUTPATIENT
Start: 2025-06-19 | End: 2025-12-16

## 2025-06-19 NOTE — PROGRESS NOTES
"Glenroy Parker is a 39 y.o. male patient presenting for a(an) virtual FUV  Visit location: patient (Glenroy Parker, home), [facility]  Pt identify self by name, , and address    Chief Complaint   Patient presents with    Anxiety    Depression    ADHD    Irritability    Follow-up    Med Management    Sleeping Problem    Panic Attack     HPI    2025  \"Doing okay, doing a lot better, still having issues motivating myself to do things some days but doing doing a lot better.\"   Tolerating Effexor XR 75 mg daily well with no issues -   Attention/focus/concentration-managed on Adderall XR 20 mg twice daily  Denies panic attacks or mood swings-has not needed Klonopin since last visit   Denies any noticeable side effects from medications  Sleep fluctuates, \"often broken, don't get a full night sleep,\" but  appetite are good  Denies SI/HI/AVH/delusions/maki/hypomania  Denies substance use    Current S/Sx:  -Mood swings: stable,   -Depressive mood: see PHQ-9 (not assessed)  -Fatigue/Energy: nearly daily  -Feeling hope/help/worthless: yes  -Sleep: sleeps 3-5yrs per night.   -Motivation: low  -Appetite/Weight Changes: increased appetite, gained about 30 lbs in the past 6 months  -Psychosis: denies hallucinations/delusions  -SI/HI: Denies current suicidal intent/plan  -Guns/Weapons at home: guns in the house, locked up in a safe place     -Worry excessively: Mostly managed  -PHQ 7 (4)     Panic attacks  Denies recent intense panic attacks     HISTORY  PSYCH HISTORY  -Psych Hx: KAROL, MDD  -Psych Hospitalization Hx: denies  -Suicide Attempt Hx: tried to hang self as a teenager x1 (\"realized it was stupid.\")  -Self-Harm/Violence Hx: denies  -Current psych meds: Wellbutrin  mg daily, Hydroxyzine 25 mg BID as needed (causes drowsiness)  -Psych Med Hx: Wellbutrin  mg daily, Hydroxyzine 25 mg as needed, Abilify 5 mg daily (didn't feel any different), Lamotrigine 200 " mg daily (didn't feel any different), Mirtazapine 7.5 mg at bedtime, Klonopin 1 mg (didn't help), Trazodone 50 mg (ineffective)     SUBSTANCE USE HISTORY  -Substance Use Hx: denies  -ETOH: occasionally  -Tobacco: about 1 ppd since 16 - 17 yrs now  -Caffeine: 2-5 cups coffee/day depending of level of fatigue  -Substance Abuse Treatment Hx: denies     FAMILY HISTORY  -Family Psych Hx: depression  -Family Suicide Hx: denies  -Family Substance Abuse Hx: dad: alcoholic     SOCIAL HISTORY  -Upbringing: Grew up with both parents. 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       PHYSICAL EXAM  Physical Exam  Psychiatric:         Attention and Perception: Attention and perception normal.         Mood and Affect: Mood and affect normal.         Speech: Speech normal.         Behavior: Behavior normal. Behavior is cooperative.         Thought Content: Thought content normal.         Cognition and Memory: Cognition and memory normal.          IMPRESSION  FUV today via virtual. Patient reports improved anxiety and depressive symptoms since bridging from Viibryd to Effexor XR 75 mg daily, but continue to struggle with low motivation and some days.  Effectively decrease Abilify 50 mg daily and finally stabilizing for irritable mood, reports less argumentative with spouse.  Denies mood swings or panic attacks, has not needed Klonopin since last visit.  Attention/focus/concentration remains managed on current dose of Adderall XR 20 mg twice daily.    Denies any noticeable side effects of medication.  Denies substance use.  Denies  SI/HI/AVH/delusions/maki/hypomania.  Sleep/appetite-normal.  Discussed to continue Adderall XR 20 mg twice daily as before, continue Abilify 15 mg daily for irritable mood, increase Effexor  mg daily to further manage anxiety and depressive symptoms.  Instructed to complete UDS prior to next visit to maintain annual compliance, will renew CSA during next visit.  Will follow up with this provider in 6 weeks to continue monitoring, or sooner if needed.      Plan:     1. Reviewed diagnostic impression including subjective and objective data and provided education about Panic attacks, MDD, KAROL, bipolar disorder, and ADHD, etiology, treatment recommendations including medication, therapy, course of treatment and prognosis. Patient amenable to treatment plan.     2. Safety Assessment: History of no thoughts, but denies current thoughts of SI, plan/intent.  No h/o SA. RF include male, unmarried/single, prior suicide attempt(s), chronic medical illness, access to weapons, and panic attacks. PF include strong coping skills, social support/connectedness, moral objections to suicide, hopefulness/future orientation, marriage/partnership, and employment, engaged in care, future oriented, no guns.  Low imminent risk     3. @  Problem List Items Addressed This Visit       Panic disorder    Sleep difficulties    Persistent depressive disorder [F34.1]    Relevant Medications    venlafaxine XR (Effexor XR) 150 mg 24 hr capsule    KAROL (generalized anxiety disorder) [F41.1]    Relevant Medications    venlafaxine XR (Effexor XR) 150 mg 24 hr capsule    Attention deficit hyperactivity disorder (ADHD)    Relevant Medications    amphetamine-dextroamphetamine XR (Adderall XR) 20 mg 24 hr capsule (Start on 7/11/2025)    Irritability and anger    Relevant Medications    ARIPiprazole (Abilify) 15 mg tablet     Other Visit Diagnoses         Compliance with medication regimen        Relevant Orders    Opiate/Opioid/Benzo Prescription  Compliance          Dx: MDD, KAROL, ADHD, r/o bipolar 2 d/o  CONTINUE Klonopin 0.5 mg daily prn for panic attacks (15 tabs more issued)   CONTINUE Adderall XR 20 mg BID (issued until 8/10/2025) - try goodrx coupon (may switch to Concerta 27 mg if neccessary)  CONTINUE Abilify 15 mg daily   INCREASE Effexor  mg daily     Reviewed r/b/a, possible side effects of the medication. Client is aware about the benefit outweighs the risk.     4. INDIVIDUAL THERAPY: weekly counseling with Dr. Yang Marquez since a month ago, missed 2 appts     5. OARRS: I have personally reviewed the OARRS report for Glenroy Parker. I have considered the risks of abuse, dependence, addiction and diversion. Last filled Adderall ER 20 mg on 06/03/2025  (60 caps x 30 days), Klonopin 0.5 mg on 11/04/2024  (15 tabs x 15 days), Oxycodone 5 mg on 03/05/2024 (20 tabs x5 days), Ativan 1 mg 06/10/2024 (10 tabs x 10 days)     6. Labs reviewed    7. Last Urine Drug Screen:   Date of Last Screen: 7/22/2024     8. Controlled Substance Agreement:   Date of the Last Agreement:  CSA (benzo) 6/10/2024 , CSA (stimulants): 7/22/2024      9. Follow-up with this provider in 6 weeks.     10. -Total time: 21 minutes via virtual     - Follow up with physical health providers as scheduled  - May follow up sooner if experiences worsening symptoms by calling  Psychiatry at [phone]  - Patient verbalized an understanding to call Mobile Crisis at [phone] ([facility]), 211, or 201/go to the nearest emergency room if experiences thoughts of harm to self or others.

## 2025-06-25 ENCOUNTER — APPOINTMENT (OUTPATIENT)
Dept: PHYSICAL THERAPY | Facility: CLINIC | Age: 40
End: 2025-06-25
Payer: COMMERCIAL

## 2025-07-01 ENCOUNTER — APPOINTMENT (OUTPATIENT)
Dept: PRIMARY CARE | Facility: CLINIC | Age: 40
End: 2025-07-01
Payer: COMMERCIAL

## 2025-07-01 VITALS
SYSTOLIC BLOOD PRESSURE: 124 MMHG | BODY MASS INDEX: 38.12 KG/M2 | WEIGHT: 305 LBS | HEART RATE: 80 BPM | OXYGEN SATURATION: 98 % | DIASTOLIC BLOOD PRESSURE: 86 MMHG

## 2025-07-01 DIAGNOSIS — E11.00 TYPE 2 DIABETES MELLITUS WITH HYPEROSMOLARITY WITHOUT COMA, WITHOUT LONG-TERM CURRENT USE OF INSULIN (MULTI): Primary | ICD-10-CM

## 2025-07-01 PROCEDURE — 3074F SYST BP LT 130 MM HG: CPT | Performed by: STUDENT IN AN ORGANIZED HEALTH CARE EDUCATION/TRAINING PROGRAM

## 2025-07-01 PROCEDURE — 4010F ACE/ARB THERAPY RXD/TAKEN: CPT | Performed by: STUDENT IN AN ORGANIZED HEALTH CARE EDUCATION/TRAINING PROGRAM

## 2025-07-01 PROCEDURE — 99214 OFFICE O/P EST MOD 30 MIN: CPT | Performed by: STUDENT IN AN ORGANIZED HEALTH CARE EDUCATION/TRAINING PROGRAM

## 2025-07-01 PROCEDURE — 3079F DIAST BP 80-89 MM HG: CPT | Performed by: STUDENT IN AN ORGANIZED HEALTH CARE EDUCATION/TRAINING PROGRAM

## 2025-07-01 RX ORDER — BLOOD-GLUCOSE SENSOR
EACH MISCELLANEOUS
Qty: 5 EACH | Refills: 0 | Status: SHIPPED | OUTPATIENT
Start: 2025-07-01

## 2025-07-01 ASSESSMENT — PATIENT HEALTH QUESTIONNAIRE - PHQ9
2. FEELING DOWN, DEPRESSED OR HOPELESS: NOT AT ALL
SUM OF ALL RESPONSES TO PHQ9 QUESTIONS 1 AND 2: 0
1. LITTLE INTEREST OR PLEASURE IN DOING THINGS: NOT AT ALL

## 2025-07-01 ASSESSMENT — ENCOUNTER SYMPTOMS: DEPRESSION: 0

## 2025-07-01 NOTE — PROGRESS NOTES
Subjective   Patient ID: Glenroy Parker is a 39 y.o. male who presents for Dizziness (And lightheaded for couple weeks /Comes & goes /).    HPI     Dizzy lh for past few days, worse on Sunday felt fuzzy all day, worse when he gets up or when he bends down. States drinkgs a lot fo water but not to much food because he is on ozempic, overall he is feeling samara.r no chest pains no sob, no sweats, does not check sugars so unknown if he has low blood sugars does state he's bp was in the 90s    Review of Systems   All other systems reviewed and are negative.      Objective   /86 (BP Location: Left arm, Patient Position: Sitting, BP Cuff Size: Large adult)   Pulse 80   Wt 138 kg (305 lb)   SpO2 98%   BMI 38.12 kg/m²     Physical Exam  Constitutional:       Appearance: Normal appearance.   HENT:      Head: Normocephalic and atraumatic.      Right Ear: Tympanic membrane and ear canal normal.      Left Ear: Tympanic membrane and ear canal normal.      Mouth/Throat:      Mouth: Mucous membranes are moist.      Pharynx: Oropharynx is clear.   Eyes:      Extraocular Movements: Extraocular movements intact.      Conjunctiva/sclera: Conjunctivae normal.      Pupils: Pupils are equal, round, and reactive to light.   Cardiovascular:      Rate and Rhythm: Normal rate and regular rhythm.      Pulses: Normal pulses.      Heart sounds: Normal heart sounds.   Pulmonary:      Effort: Pulmonary effort is normal.      Breath sounds: Normal breath sounds.   Abdominal:      General: Abdomen is flat. Bowel sounds are normal.      Palpations: Abdomen is soft.   Musculoskeletal:         General: Normal range of motion.      Cervical back: Normal range of motion and neck supple.   Skin:     General: Skin is warm and dry.      Capillary Refill: Capillary refill takes 2 to 3 seconds.   Neurological:      General: No focal deficit present.      Mental Status: He is alert and oriented to person, place, and time. Mental status is at  baseline.   Psychiatric:         Mood and Affect: Mood normal.         Behavior: Behavior normal.         Thought Content: Thought content normal.         Judgment: Judgment normal.       Assessment/Plan   1. Type 2 diabetes mellitus with hyperosmolarity without coma, without long-term current use of insulin (Multi) (Primary)  - given symptoms will want lisa to make sure he is not getting to low of blood sugars  - encouarge hydration and food intake given ozempic  - FreeStyle Lisa 3 Sensor device; Replace every 14 days  Dispense: 5 each; Refill: 0  - stop losartan givne low bp and monitor

## 2025-07-09 DIAGNOSIS — M54.16 LUMBAR RADICULOPATHY: ICD-10-CM

## 2025-07-10 DIAGNOSIS — B35.1 ONYCHOMYCOSIS: ICD-10-CM

## 2025-07-14 DIAGNOSIS — F34.1 PERSISTENT DEPRESSIVE DISORDER: ICD-10-CM

## 2025-07-14 DIAGNOSIS — F41.1 GAD (GENERALIZED ANXIETY DISORDER): ICD-10-CM

## 2025-07-14 RX ORDER — VENLAFAXINE HYDROCHLORIDE 150 MG/1
150 CAPSULE, EXTENDED RELEASE ORAL DAILY
Qty: 90 CAPSULE | Refills: 1 | OUTPATIENT
Start: 2025-07-14 | End: 2025-09-12

## 2025-07-16 DIAGNOSIS — Z79.4 TYPE 2 DIABETES MELLITUS WITH HYPERGLYCEMIA, WITH LONG-TERM CURRENT USE OF INSULIN: Primary | ICD-10-CM

## 2025-07-16 DIAGNOSIS — E11.65 TYPE 2 DIABETES MELLITUS WITH HYPERGLYCEMIA, WITH LONG-TERM CURRENT USE OF INSULIN: Primary | ICD-10-CM

## 2025-07-18 ENCOUNTER — APPOINTMENT (OUTPATIENT)
Dept: BEHAVIORAL HEALTH | Facility: CLINIC | Age: 40
End: 2025-07-18
Payer: COMMERCIAL

## 2025-07-19 DIAGNOSIS — Z91.89 COMPLIANCE WITH MEDICATION REGIMEN: ICD-10-CM

## 2025-07-20 LAB
ALBUMIN/CREAT UR: NORMAL
CREAT UR-MCNC: NORMAL MG/DL
EST. AVERAGE GLUCOSE BLD GHB EST-MCNC: 148 MG/DL
EST. AVERAGE GLUCOSE BLD GHB EST-SCNC: 8.2 MMOL/L
HBA1C MFR BLD: 6.8 %
MICROALBUMIN UR-MCNC: NORMAL

## 2025-07-21 ENCOUNTER — APPOINTMENT (OUTPATIENT)
Dept: DERMATOLOGY | Facility: CLINIC | Age: 40
End: 2025-07-21
Payer: COMMERCIAL

## 2025-07-21 LAB
ALBUMIN/CREAT UR: 10 MG/G CREAT
CREAT UR-MCNC: 172 MG/DL (ref 20–320)
EST. AVERAGE GLUCOSE BLD GHB EST-MCNC: 148 MG/DL
EST. AVERAGE GLUCOSE BLD GHB EST-SCNC: 8.2 MMOL/L
HBA1C MFR BLD: 6.8 %
MICROALBUMIN UR-MCNC: 1.7 MG/DL

## 2025-07-22 LAB
1OH-MIDAZOLAM UR-MCNC: NEGATIVE NG/ML
7AMINOCLONAZEPAM UR-MCNC: NEGATIVE NG/ML
A-OH ALPRAZ UR-MCNC: NEGATIVE NG/ML
A-OH-TRIAZOLAM UR-MCNC: NEGATIVE NG/ML
AMPHET UR-MCNC: 9611 NG/ML
AMPHETAMINES UR QL: POSITIVE NG/ML
BARBITURATES UR QL: NEGATIVE NG/ML
BZE UR QL: NEGATIVE NG/ML
CODEINE UR-MCNC: NEGATIVE NG/ML
CREAT UR-MCNC: 180.8 MG/DL
DRUG SCREEN COMMENT UR-IMP: ABNORMAL
EDDP UR-MCNC: NEGATIVE NG/ML
FENTANYL UR-MCNC: NEGATIVE NG/ML
HYDROCODONE UR-MCNC: NEGATIVE NG/ML
HYDROMORPHONE UR-MCNC: NEGATIVE NG/ML
LORAZEPAM UR-MCNC: NEGATIVE NG/ML
METHADONE UR-MCNC: NEGATIVE NG/ML
METHAMPHET UR-MCNC: NEGATIVE NG/ML
MORPHINE UR-MCNC: NEGATIVE NG/ML
NORDIAZEPAM UR-MCNC: NEGATIVE NG/ML
NORFENTANYL UR-MCNC: NEGATIVE NG/ML
NORHYDROCODONE UR CFM-MCNC: NEGATIVE NG/ML
NOROXYCODONE UR CFM-MCNC: NEGATIVE NG/ML
NORTRAMADOL UR-MCNC: NEGATIVE NG/ML
OH-ETHYLFLURAZ UR-MCNC: NEGATIVE NG/ML
OXAZEPAM UR-MCNC: NEGATIVE NG/ML
OXIDANTS UR QL: NEGATIVE MCG/ML
OXYCODONE UR CFM-MCNC: NEGATIVE NG/ML
OXYMORPHONE UR CFM-MCNC: NEGATIVE NG/ML
PCP UR QL: NEGATIVE NG/ML
PH UR: 5.5 [PH] (ref 4.5–9)
QUEST 6 ACETYLMORPHINE: NEGATIVE NG/ML
QUEST NOTES AND COMMENTS: ABNORMAL
QUEST ZOLPIDEM: NEGATIVE NG/ML
TEMAZEPAM UR-MCNC: NEGATIVE NG/ML
THC UR QL: NEGATIVE NG/ML
TRAMADOL UR-MCNC: NEGATIVE NG/ML
ZOLPIDEM PHENYL-4-CARB UR CFM-MCNC: NEGATIVE NG/ML

## 2025-07-24 ENCOUNTER — APPOINTMENT (OUTPATIENT)
Dept: ENDOCRINOLOGY | Facility: CLINIC | Age: 40
End: 2025-07-24
Payer: COMMERCIAL

## 2025-07-29 ENCOUNTER — APPOINTMENT (OUTPATIENT)
Dept: BEHAVIORAL HEALTH | Facility: CLINIC | Age: 40
End: 2025-07-29
Payer: COMMERCIAL

## 2025-07-29 DIAGNOSIS — F90.0 ATTENTION DEFICIT HYPERACTIVITY DISORDER (ADHD), PREDOMINANTLY INATTENTIVE TYPE: ICD-10-CM

## 2025-07-29 DIAGNOSIS — F41.1 GAD (GENERALIZED ANXIETY DISORDER): ICD-10-CM

## 2025-07-29 DIAGNOSIS — F34.1 PERSISTENT DEPRESSIVE DISORDER: ICD-10-CM

## 2025-07-29 DIAGNOSIS — R45.4 IRRITABILITY AND ANGER: ICD-10-CM

## 2025-07-29 DIAGNOSIS — F41.0 PANIC DISORDER: ICD-10-CM

## 2025-07-29 PROCEDURE — 4010F ACE/ARB THERAPY RXD/TAKEN: CPT

## 2025-07-29 PROCEDURE — 99214 OFFICE O/P EST MOD 30 MIN: CPT

## 2025-07-29 RX ORDER — VENLAFAXINE HYDROCHLORIDE 150 MG/1
150 CAPSULE, EXTENDED RELEASE ORAL DAILY
Qty: 90 CAPSULE | Refills: 1 | Status: SHIPPED | OUTPATIENT
Start: 2025-07-29 | End: 2026-01-25

## 2025-07-29 RX ORDER — DEXTROAMPHETAMINE SACCHARATE, AMPHETAMINE ASPARTATE MONOHYDRATE, DEXTROAMPHETAMINE SULFATE AND AMPHETAMINE SULFATE 5; 5; 5; 5 MG/1; MG/1; MG/1; MG/1
20 CAPSULE, EXTENDED RELEASE ORAL
Qty: 60 CAPSULE | Refills: 0 | Status: SHIPPED | OUTPATIENT
Start: 2025-10-06 | End: 2025-11-05

## 2025-07-29 RX ORDER — DEXTROAMPHETAMINE SACCHARATE, AMPHETAMINE ASPARTATE MONOHYDRATE, DEXTROAMPHETAMINE SULFATE AND AMPHETAMINE SULFATE 5; 5; 5; 5 MG/1; MG/1; MG/1; MG/1
20 CAPSULE, EXTENDED RELEASE ORAL
Qty: 60 CAPSULE | Refills: 0 | Status: SHIPPED | OUTPATIENT
Start: 2025-08-09 | End: 2025-09-08

## 2025-07-29 RX ORDER — DEXTROAMPHETAMINE SACCHARATE, AMPHETAMINE ASPARTATE MONOHYDRATE, DEXTROAMPHETAMINE SULFATE AND AMPHETAMINE SULFATE 5; 5; 5; 5 MG/1; MG/1; MG/1; MG/1
20 CAPSULE, EXTENDED RELEASE ORAL
Qty: 60 CAPSULE | Refills: 0 | Status: SHIPPED | OUTPATIENT
Start: 2025-09-07 | End: 2025-10-07

## 2025-07-29 NOTE — PROGRESS NOTES
"Glenroy Parker is a 39 y.o. male patient presenting for a(an) virtual FUV  Visit location: patient (Glenroy Parker, home), provider (DARRICK Krishna,  [place] [facility])  Pt identify self by name, , and address    Chief Complaint   Patient presents with   • Irritability   • ADHD   • Anxiety   • Depression   • Panic Attack   • Follow-up   • Med Management     HPI    2025  \"Doing great today.\"  Reports improved anxiety and depressive symptoms with the increased dose of Effexor  mg daily  Remain adherent with Adderall XR 20 mg BID and Abilify 15 mg daily with no side effects or concerns  Attention deficit symptoms-remain managed on Adderall XR 20 mg twice daily   Denies panic attacks or mood swings  Denies substance use-completed UDS as instructed ordered  Sleep fluctuates,  Appetite-normal   Denies SI/HI/AVH/delusions/maki/hypomania    Current S/Sx:  -Mood swings: stable,   -Depressive mood: see PHQ-9 (not assessed)  -Fatigue/Energy: nearly daily  -Feeling hope/help/worthless: yes  -Sleep: sleeps 3-5yrs per night.   -Motivation: low  -Appetite/Weight Changes: increased appetite, gained about 30 lbs in the past 6 months  -Psychosis: denies hallucinations/delusions  -SI/HI: Denies current suicidal intent/plan  -Guns/Weapons at home: guns in the house, locked up in a safe place     -Worry excessively: Mostly managed  -PHQ 7 (4)     Panic attacks  Denies recent intense panic attacks     HISTORY  PSYCH HISTORY  -Psych Hx: KAROL, MDD  -Psych Hospitalization Hx: denies  -Suicide Attempt Hx: tried to hang self as a teenager x1 (\"realized it was stupid.\")  -Self-Harm/Violence Hx: denies  -Current psych meds: Wellbutrin  mg daily, Hydroxyzine 25 mg BID as needed (causes drowsiness)  -Psych Med Hx: Wellbutrin  mg daily, Hydroxyzine 25 mg as needed, Abilify 5 mg daily (didn't feel any different), Lamotrigine 200 mg daily (didn't feel any different), Mirtazapine 7.5 mg at bedtime, Klonopin 1 " mg (didn't help), Trazodone 50 mg (ineffective)     SUBSTANCE USE HISTORY  -Substance Use Hx: denies  -ETOH: occasionally  -Tobacco: about 1 ppd since 16 - 17 yrs now  -Caffeine: 2-5 cups coffee/day depending of level of fatigue  -Substance Abuse Treatment Hx: denies     FAMILY HISTORY  -Family Psych Hx: depression  -Family Suicide Hx: denies  -Family Substance Abuse Hx: dad: alcoholic     SOCIAL HISTORY  -Upbringing: Grew up with both parents. Has 1 brother  -Support system: good  -Trauma: emotional  -Education: some college  -Work: [employer]  -Marital Status: single, fiance  -Children: none  -Living situation: lives in house with fiance  -: denies  -Legal: denies      MEDICAL HISTORY  -PCP: Brian Hoang DO  -TBI/head trauma/LOC/seizure hx: 4-5 concoctions, several LOC     REVIEW OF SYSTEMS  Review of Systems   All other systems reviewed and are negative.       PHYSICAL EXAM  Physical Exam    Psychiatric:         Attention and Perception: Attention and perception normal.         Mood and Affect: Mood and affect normal.         Speech: Speech normal.         Behavior: Behavior normal. Behavior is cooperative.         Thought Content: Thought content normal.         Cognition and Memory: Cognition and memory normal.          IMPRESSION  FUV today via virtual. Patient reports significant improvement with anxiety, depression, mood, and attention deficit symptoms on Effexor  mg daily, Abilify 50 mg daily, Adderall XR 20 mg twice daily with no noticeable side effects or concerns.  Sleep continues to fluctuate due to a change in work schedule, but appetite remains unaffected.  Denies panic attacks or mood swings.  Denies substance use.   Denies SI/HI/AVH/delusions/maki/hypomania.  Annual UDS and CSA to maintain compliance updated.  Discussed to continue on current regimen and follow up with this provider in 8 weeks to continue monitoring, or sooner if needed.      Plan:     1. Reviewed  diagnostic impression including subjective and objective data and provided education about Panic attacks, MDD, KAROL, bipolar disorder, and ADHD, etiology, treatment recommendations including medication, therapy, course of treatment and prognosis. Patient amenable to treatment plan.     2. Safety Assessment: History of no thoughts, but denies current thoughts of SI, plan/intent.  No h/o SA. RF include male, unmarried/single, prior suicide attempt(s), chronic medical illness, access to weapons, and panic attacks. PF include strong coping skills, social support/connectedness, moral objections to suicide, hopefulness/future orientation, marriage/partnership, and employment, engaged in care, future oriented, no guns.  Low imminent risk     3. @  Problem List Items Addressed This Visit       Panic disorder    Persistent depressive disorder [F34.1]    Relevant Medications    amphetamine-dextroamphetamine XR (Adderall XR) 20 mg 24 hr capsule (Start on 8/9/2025)    amphetamine-dextroamphetamine XR (Adderall XR) 20 mg 24 hr capsule (Start on 9/7/2025)    amphetamine-dextroamphetamine XR (Adderall XR) 20 mg 24 hr capsule (Start on 10/6/2025)    venlafaxine XR (Effexor XR) 150 mg 24 hr capsule    KAROL (generalized anxiety disorder) [F41.1]    Relevant Medications    venlafaxine XR (Effexor XR) 150 mg 24 hr capsule    Attention deficit hyperactivity disorder (ADHD)    Relevant Medications    amphetamine-dextroamphetamine XR (Adderall XR) 20 mg 24 hr capsule (Start on 8/9/2025)    amphetamine-dextroamphetamine XR (Adderall XR) 20 mg 24 hr capsule (Start on 9/7/2025)    amphetamine-dextroamphetamine XR (Adderall XR) 20 mg 24 hr capsule (Start on 10/6/2025)    venlafaxine XR (Effexor XR) 150 mg 24 hr capsule    Irritability and anger     Dx: MDD, KAROL, ADHD, r/o bipolar 2 d/o  CONTINUE Klonopin 0.5 mg daily prn for panic attacks (15 tabs more issued)   CONTINUE Adderall XR 20 mg BID (issued until 11/05/2025) - try goodrx coupon (may  switch to Concerta 27 mg if neccessary)  CONTINUE Abilify 15 mg daily   CONTINUE Effexor  mg daily     Reviewed r/b/a, possible side effects of the medication. Client is aware about the benefit outweighs the risk.     4. INDIVIDUAL THERAPY: weekly counseling with Dr. Yang Marquez since a month ago, missed 2 appts     5. OARRS: I have personally reviewed the OARRS report for Glenroy Velezanaly. I have considered the risks of abuse, dependence, addiction and diversion. Last filled Adderall ER 20 mg on 07/17/2025 (60 caps x 30 days), Klonopin 0.5 mg on 11/04/2024  (15 tabs x 15 days), Oxycodone 5 mg on 03/05/2024 (20 tabs x5 days), Ativan 1 mg 06/10/2024 (10 tabs x 10 days)     6. Labs reviewed    7. Last Urine Drug Screen:   Date of Last Screen: 07/19/2025     8. Controlled Substance Agreement:   Date of the Last Agreement:  CSA (benzo) 6/10/2024 , CSA (stimulants): 07/29/2025     9. Follow-up with this provider in 8 weeks.     10. -Total time: 24 minutes via virtual     - Follow up with physical health providers as scheduled  - May follow up sooner if experiences worsening symptoms by calling  Psychiatry at [phone]  - Patient verbalized an understanding to call Mobile Crisis at [phone] ([facility]), 211, or 921/go to the nearest emergency room if experiences thoughts of harm to self or others.

## 2025-08-01 ENCOUNTER — HOSPITAL ENCOUNTER (OUTPATIENT)
Facility: HOSPITAL | Age: 40
Discharge: HOME | End: 2025-08-01
Payer: COMMERCIAL

## 2025-08-01 VITALS
TEMPERATURE: 98.1 F | DIASTOLIC BLOOD PRESSURE: 93 MMHG | BODY MASS INDEX: 39.04 KG/M2 | HEART RATE: 90 BPM | HEIGHT: 75 IN | RESPIRATION RATE: 16 BRPM | WEIGHT: 314 LBS | OXYGEN SATURATION: 96 % | SYSTOLIC BLOOD PRESSURE: 136 MMHG

## 2025-08-01 DIAGNOSIS — M54.16 LUMBAR RADICULOPATHY: ICD-10-CM

## 2025-08-01 PROCEDURE — 2550000001 HC RX 255 CONTRASTS: Mod: JW | Performed by: ANESTHESIOLOGY

## 2025-08-01 PROCEDURE — 7100000009 HC PHASE TWO TIME - INITIAL BASE CHARGE

## 2025-08-01 PROCEDURE — 64483 NJX AA&/STRD TFRM EPI L/S 1: CPT | Mod: 50 | Performed by: ANESTHESIOLOGY

## 2025-08-01 PROCEDURE — 2500000004 HC RX 250 GENERAL PHARMACY W/ HCPCS (ALT 636 FOR OP/ED): Performed by: ANESTHESIOLOGY

## 2025-08-01 PROCEDURE — 2720000007 HC OR 272 NO HCPCS

## 2025-08-01 PROCEDURE — 7100000010 HC PHASE TWO TIME - EACH INCREMENTAL 1 MINUTE

## 2025-08-01 PROCEDURE — 64483 NJX AA&/STRD TFRM EPI L/S 1: CPT | Performed by: ANESTHESIOLOGY

## 2025-08-01 RX ORDER — DEXAMETHASONE SODIUM PHOSPHATE 10 MG/ML
INJECTION INTRAMUSCULAR; INTRAVENOUS
Status: COMPLETED | OUTPATIENT
Start: 2025-08-01 | End: 2025-08-01

## 2025-08-01 RX ORDER — LIDOCAINE HYDROCHLORIDE 5 MG/ML
INJECTION, SOLUTION INFILTRATION; INTRAVENOUS
Status: COMPLETED | OUTPATIENT
Start: 2025-08-01 | End: 2025-08-01

## 2025-08-01 RX ORDER — MIDAZOLAM HYDROCHLORIDE 1 MG/ML
INJECTION, SOLUTION INTRAMUSCULAR; INTRAVENOUS
Status: COMPLETED | OUTPATIENT
Start: 2025-08-01 | End: 2025-08-01

## 2025-08-01 RX ADMIN — DEXAMETHASONE SODIUM PHOSPHATE 10 MG: 10 INJECTION, SOLUTION INTRAMUSCULAR; INTRAVENOUS at 11:42

## 2025-08-01 RX ADMIN — LIDOCAINE HYDROCHLORIDE 10 ML: 5 INJECTION, SOLUTION INFILTRATION at 11:41

## 2025-08-01 RX ADMIN — IOHEXOL 2 ML: 240 INJECTION, SOLUTION INTRATHECAL; INTRAVASCULAR; INTRAVENOUS; ORAL at 11:42

## 2025-08-01 RX ADMIN — MIDAZOLAM 2 MG: 1 INJECTION INTRAMUSCULAR; INTRAVENOUS at 11:36

## 2025-08-01 ASSESSMENT — PAIN - FUNCTIONAL ASSESSMENT
PAIN_FUNCTIONAL_ASSESSMENT: 0-10
PAIN_FUNCTIONAL_ASSESSMENT: WONG-BAKER FACES
PAIN_FUNCTIONAL_ASSESSMENT: 0-10
PAIN_FUNCTIONAL_ASSESSMENT: WONG-BAKER FACES
PAIN_FUNCTIONAL_ASSESSMENT: 0-10

## 2025-08-01 ASSESSMENT — PAIN SCALES - GENERAL
PAINLEVEL_OUTOF10: 8
PAINLEVEL_OUTOF10: 8
PAINLEVEL_OUTOF10: 6
PAINLEVEL_OUTOF10: 5 - MODERATE PAIN
PAINLEVEL_OUTOF10: 0 - NO PAIN

## 2025-08-01 ASSESSMENT — COLUMBIA-SUICIDE SEVERITY RATING SCALE - C-SSRS
2. HAVE YOU ACTUALLY HAD ANY THOUGHTS OF KILLING YOURSELF?: NO
1. IN THE PAST MONTH, HAVE YOU WISHED YOU WERE DEAD OR WISHED YOU COULD GO TO SLEEP AND NOT WAKE UP?: NO
6. HAVE YOU EVER DONE ANYTHING, STARTED TO DO ANYTHING, OR PREPARED TO DO ANYTHING TO END YOUR LIFE?: NO

## 2025-08-01 NOTE — DISCHARGE INSTRUCTIONS
DISCHARGE INSTRUCTIONS FOR INJECTIONS     After most injections, it is recommended that you relax and limit your activity for the remainder of the day unless you have been told otherwise by your pain physician.  You should not drive a car, operate machinery, or make important legal decisions unless otherwise directed by your pain physician.  You may resume your normal activity, including exercise, tomorrow.      Keep a written pain diary of how much pain relief you experienced following the injection procedure and the length of time of pain relief you experienced pain relief. Following diagnostic injections like medial branch nerve blocks, sacroiliac joint blocks, stellate ganglion injections and other blocks, it is very important you record the specific amount of pain relief you experienced immediately after the injectionand how long it lasted. Your doctor will ask you for this information at your follow up visit.     For all injections, please keep the injection site dry and inspect the site for a couple of days. You may remove the Band-Aid the day of the injection at any time.     Some discomfort, bruising or slight swelling may occur at the injection site. This is not abnormal if it occurs.  If needed you may:    -Take over the counter medication such as Tylenol or Motrin.   -Apply an ice pack for 30 minutes, 2 to 3 times a day for the first 24 hours.     You may shower today; no soaking baths, hot tubs, whirlpools or swimming pools for two days.      If you are given steroids in your injection, it may take 3-5 days for the steroid medication to take effect. You may notice a worsening of your symptoms for 1-2 days after the injection. This is not abnormal.  You may use acetaminophen, ibuprofen, or prescription medication that your doctor may have prescribed for you if you need to do so.     A few common side effects of steroids include facial flushing, sweating, restlessness, irritability,difficulty sleeping,  increase in blood sugar, and increased blood pressure. If you have diabetes, please monitor your blood sugar at least once a day for at least 5 days. If you have poorly controlled high blood pressure, monitoryour blood pressure for at least 2 days and contact your primary care physician if these numbers are unusually high for you.      If you take aspirin or non-steroidal anti-inflammatory drugs (examples are Motrin, Advil, ibuprofen, Naprosyn, Voltaren, Relafen, etc.) you may restart these this evening, but stop taking it 3 days before your next appointment, unless instructed otherwiseby your physician.      You do not need to discontinue non-aspirin-containing pain medications prior to an injection (examples: Celebrex, tramadol, hydrocodone and acetaminophen).      If you take a blood thinning medication (Coumadin, Lovenox, Fragmin,Ticlid, Plavix, Pradaxa, etc.), please discuss this with your primary care physician/cardiologist and your pain physician. These medications MUST be discontinued before you can have an injection safely, without the risk of uncontrolled bleeding. If these medications are not discontinued for an appropriate period of time, you will not be able to receivean injection. Please adhere to instructions given to you about when to restart your blood thinning medication. If you have any questions please reach out to our team.    If you are taking Coumadin, please have your INR checked the morning of your procedure and bring the result to your appointment unless otherwise instructed. If your INR is over 1.2, your injection may need to be rescheduled to avoid uncontrolled bleeding from the needle placement.     Call [facility]  and ask for Pain Management at [phone] between 8am-4pm Monday - Friday if you are experiencing the following:    If you received an epidural or spinal injection:    -Headache that doesnot go away with medicine, is worse when sitting or standing up, and is greatly  relieved upon lying down.   -Severe pain worse than or different than your baseline pain.   -Chills or fever (101º F or greater).   -Drainage or signs of infection at the injection site     Go directly to the Emergency Department if you are experiencing the following and received an epidural or spinal injection:   -Abrupt weakness or progressive weakness in your legs that starts after you leave the clinic.   -Abrupt severe or worsening numbness in your legs.   -Inability to urinate after the injection or loss of bowel or bladder control without the urge to defecate or urinate.     If you have a clinical question that cannot wait until your next appointment, please call [phone] between 8am-4pm Monday - Friday or send a [employer] message. We do our best to return all non-emergency messages within 24 hours, Monday - Friday. A nurse or physician will return your message. You may also try calling and they will do their best to answer your question(s):   - Dr. Jake Pimentel's nurse ([phone])    If you need to cancel an appointment, please call the scheduling staff at [phone] during normal business hours or leave a message at least 24 hours in advance.     If you are going to be sedated for your next procedure, you MUST have responsible adult who can legally drive accompany you home. You cannot eat or drink for at least eight hours prior to the planned procedure if you are going to receive sedation. You may take your non-blood thinning medications with a small sip of water.

## 2025-08-01 NOTE — H&P
Pain Management H&P    History Of Present Illness  Glenroy Parker is a 39 y.o. male presents for procedure stated below. Endorses no changes in past medical history or medical health since last seen in clinic.     Past Medical History  He has a past medical history of ADHD (attention deficit hyperactivity disorder), Anxiety, Chronic pain disorder, Depression, Diabetes (Multi), Headache, Memory loss, Other specified soft tissue disorders (11/16/2015), Panic attack, and Personal history of other diseases of the digestive system (08/11/2016).    Surgical History  He has a past surgical history that includes Tonsillectomy (11/16/2015); Other surgical history (09/12/2016); Neck surgery (02/18/2016); Other surgical history (06/07/2016); Other surgical history (06/07/2016); and Knee arthroscopy w/ debridement (02/18/2016).     Social History  He reports that he has quit smoking. His smoking use included cigarettes, pipe, and cigars. He has a 15 pack-year smoking history. He has never used smokeless tobacco. He reports current alcohol use of about 7.0 standard drinks of alcohol per week. He reports that he does not use drugs.    Family History  Family History[1]     Allergies  Patient has no known allergies.    Review of Symptoms:   Constitutional: Negative for chills, diaphoresis or fever  HENT: Negative for neck swelling  Eyes:.  Negative for eye pain  Respiratory:.  Negative for cough, shortness of breath or wheezing    Cardiovascular:.  Negative for chest pain or palpitations  Gastrointestinal:.  Negative for abdominal pain, nausea and vomiting  Genitourinary:.  Negative for urgency  Musculoskeletal:  Positive for back pain. Positive for joint pain. Denies falls within the past 3 months.  Skin: Negative for wounds or itching   Neurological: Negative for dizziness, seizures, loss of consciousness and weakness  Endo/Heme/Allergies: Does not bruise/bleed easily  Psychiatric/Behavioral: Negative for depression. The patient  does not appear anxious.      Pre-sedation Evaluation  ASA class 2  Mallampati score 2     PHYSICAL EXAM  Vitals signs reviewed  Constitutional:       General: Not in acute distress     Appearance: Normal appearance. Not ill-appearing.  HENT:     Head: Normocephalic and atraumatic  Eyes:     Conjunctiva/sclera: Conjunctivae normal  Cardiovascular:     Rate and Rhythm: Normal rate and regular rhythm  Pulmonary:     Effort: No respiratory distress  Abdominal:     Palpations: Abdomen is soft  Musculoskeletal: CHEN  Skin:     General: Skin is warm and dry  Neurological:     General: No focal deficit present  Psychiatric:         Mood and Affect: Mood normal         Behavior: Behavior normal     Last Recorded Vitals  There were no vitals taken for this visit.    Relevant Results  Current Outpatient Medications   Medication Instructions    [START ON 8/9/2025] amphetamine-dextroamphetamine XR (Adderall XR) 20 mg 24 hr capsule 20 mg, oral, 2 times daily (morning and late afternoon), Do not crush or chew.    [START ON 9/7/2025] amphetamine-dextroamphetamine XR (Adderall XR) 20 mg 24 hr capsule 20 mg, oral, 2 times daily (morning and late afternoon), Do not crush or chew.    [START ON 10/6/2025] amphetamine-dextroamphetamine XR (Adderall XR) 20 mg 24 hr capsule 20 mg, oral, 2 times daily (morning and late afternoon), Do not crush or chew.    ARIPiprazole (ABILIFY) 15 mg, oral, Daily    benzoyl peroxide (Benzac AC) 10 % external wash 1 Application, Topical, Daily    blood sugar diagnostic (Blood Glucose Test) Check blood glucose three time per day    blood-glucose meter misc Use to check blood glucose    Clenpiq 10 mg-3.5 gram- 12 gram/175 mL solution TAKE 1 KIT BY MOUTH 1 TIME FOR 1 DOSE.    clindamycin (Cleocin T) 1 % lotion Topical, 2 times daily    FreeStyle Lisa 3 Sensor device Replace every 14 days    Jardiance 10 mg, oral, Daily    lancets 33 gauge misc Use three times a day    losartan (COZAAR) 50 mg, oral, Daily     "metFORMIN XR (GLUCOPHAGE-XR) 1,000 mg, oral, 2 times daily (morning and late afternoon)    Ozempic 2 mg, subcutaneous, Every 7 days    pen needle, diabetic (BD Ale 2nd Gen Pen Needle) 32 gauge x 5/32\" needle Use 2 a day with insulin as directed    pioglitazone (Actos) 15 mg tablet 1 tab daily    terbinafine (LAMISIL) 250 mg, oral, Daily    venlafaxine XR (EFFEXOR XR) 150 mg, oral, Daily, Do not crush or chew.         MR lumbar spine wo IV contrast 06/17/2025    Narrative  Interpreted By:  Daisha Whittaker,  STUDY:  MRI of the lumbar spine without IV contrast;  6/17/2025 1:19 pm    INDICATION:  Signs/Symptoms:back pain.    ,M54.16 Radiculopathy, lumbar region    COMPARISON:  None.    ACCESSION NUMBER(S):  [id number]    ORDERING CLINICIAN:  KAVYA DOYLE    TECHNIQUE:  Sagittal T1, sagittal T2, sagittal STIR, axial T1 and axial T2  weighted images through the lumbar spine.    FINDINGS:  For counting purposes the last lumbarized vertebral body is labeled  L5.    Alignment, vertebral body heights and marrow signal pattern are  within normal limits.    There is mild desiccated disc signal at L4-L5. Intervertebral disc  spaces are maintained.    The conus terminates at T12-L1 and is unremarkable.    Evaluation by level:    T12-L1: No spinal canal or neural foraminal stenosis.    L1-L2: Facet arthrosis. No spinal canal or neural foraminal stenosis    L2-L3: Facet arthrosis. No spinal canal or neural foraminal stenosis.    L3-L4: Disc bulge and facet arthrosis. No spinal canal stenosis. No  neural foraminal stenosis.    L4-L5: Disc bulge and facet arthrosis. No spinal canal stenosis. Mild  neural foraminal stenosis.    L5-S1: Disc bulge and facet arthrosis. No spinal canal stenosis. No  neural foraminal stenosis.    Impression  Mild degenerative changes of the lumbar spine without spinal canal  stenosis. Mild neural foraminal narrowing at L4-L5.    I personally reviewed the images/study and I agree with the findings  as " stated. This study was interpreted at [facility], [place], [place].    MACRO:  None    Signed by: Daisha Whittaker 6/18/2025 11:00 AM  Dictation workstation:   [id number]       ASSESSMENT/PLAN  Glenroy Parker is a 39 y.o. male here for Bilateral lumbar transforaminal epidural steroid injections    he denies any recent antibiotic use or infections, he denies any blood thinner use , and he has no known medical allergies    Risks, benefits, alternatives discussed. All questions answered to the best of my ability. Patient agrees to proceed.      Our plan is as follows:  - Proceed with aforementioned procedure     Kianna Cruz, DO  Chronic Pain Management Fellow        [1]   Family History  Problem Relation Name Age of Onset    Hypertension Mother      Other (varicose veins) Mother      Diabetes Mother Nicol voll     Arthritis Mother Nicol voll     Hernia Mother Nicol voll     Alcohol abuse Father Glenroy Parker     Dementia Maternal Grandmother Shea jameson     Alcohol abuse Father Glenroy Parker     Dementia Maternal Grandmother Shea gisell     Alcohol abuse Father Glenroy Parker     Cancer Father Glenroy Parker     Diabetes Father Glenroy Parker     Learning disabilities Brother Matthieu     Stroke Maternal Grandmother Jennifer jameson     Stroke Maternal Grandfather Glenroy jameson     Alcohol abuse Father Glenroy Voll     Dementia Maternal Grandmother Shea jameson

## 2025-08-03 DIAGNOSIS — E11.65 TYPE 2 DIABETES MELLITUS WITH HYPERGLYCEMIA, WITH LONG-TERM CURRENT USE OF INSULIN: ICD-10-CM

## 2025-08-03 DIAGNOSIS — E78.5 HYPERLIPIDEMIA, UNSPECIFIED HYPERLIPIDEMIA TYPE: ICD-10-CM

## 2025-08-03 DIAGNOSIS — Z79.4 TYPE 2 DIABETES MELLITUS WITH HYPERGLYCEMIA, WITH LONG-TERM CURRENT USE OF INSULIN: ICD-10-CM

## 2025-08-04 RX ORDER — PIOGLITAZONE 15 MG/1
15 TABLET ORAL DAILY
Qty: 90 TABLET | Refills: 0 | Status: SHIPPED | OUTPATIENT
Start: 2025-08-04

## 2025-08-06 DIAGNOSIS — M25.561 CHRONIC PAIN OF RIGHT KNEE: ICD-10-CM

## 2025-08-06 DIAGNOSIS — G89.29 CHRONIC PAIN OF RIGHT KNEE: ICD-10-CM

## 2025-08-12 ENCOUNTER — HOSPITAL ENCOUNTER (OUTPATIENT)
Dept: RADIOLOGY | Facility: CLINIC | Age: 40
Discharge: HOME | End: 2025-08-12
Payer: COMMERCIAL

## 2025-08-12 ENCOUNTER — APPOINTMENT (OUTPATIENT)
Dept: ORTHOPEDIC SURGERY | Facility: CLINIC | Age: 40
End: 2025-08-12
Payer: COMMERCIAL

## 2025-08-12 VITALS — WEIGHT: 314 LBS | BODY MASS INDEX: 39.04 KG/M2 | HEIGHT: 75 IN

## 2025-08-12 DIAGNOSIS — M25.561 CHRONIC PAIN OF RIGHT KNEE: ICD-10-CM

## 2025-08-12 DIAGNOSIS — S83.231A COMPLEX TEAR OF MEDIAL MENISCUS OF RIGHT KNEE AS CURRENT INJURY, INITIAL ENCOUNTER: Primary | ICD-10-CM

## 2025-08-12 DIAGNOSIS — G89.29 CHRONIC PAIN OF RIGHT KNEE: ICD-10-CM

## 2025-08-12 PROCEDURE — 4010F ACE/ARB THERAPY RXD/TAKEN: CPT | Performed by: ORTHOPAEDIC SURGERY

## 2025-08-12 PROCEDURE — 73560 X-RAY EXAM OF KNEE 1 OR 2: CPT | Mod: 50

## 2025-08-12 PROCEDURE — 73560 X-RAY EXAM OF KNEE 1 OR 2: CPT | Mod: RIGHT SIDE | Performed by: RADIOLOGY

## 2025-08-12 PROCEDURE — 3008F BODY MASS INDEX DOCD: CPT | Performed by: ORTHOPAEDIC SURGERY

## 2025-08-12 PROCEDURE — 99212 OFFICE O/P EST SF 10 MIN: CPT | Performed by: ORTHOPAEDIC SURGERY

## 2025-08-12 PROCEDURE — 73560 X-RAY EXAM OF KNEE 1 OR 2: CPT | Mod: RT

## 2025-08-12 PROCEDURE — 99214 OFFICE O/P EST MOD 30 MIN: CPT | Performed by: ORTHOPAEDIC SURGERY

## 2025-08-12 PROCEDURE — 73565 X-RAY EXAM OF KNEES: CPT | Mod: RIGHT SIDE | Performed by: RADIOLOGY

## 2025-08-24 DIAGNOSIS — B35.1 ONYCHOMYCOSIS: ICD-10-CM

## 2025-08-26 ENCOUNTER — OFFICE VISIT (OUTPATIENT)
Dept: PAIN MEDICINE | Facility: HOSPITAL | Age: 40
End: 2025-08-26
Payer: COMMERCIAL

## 2025-08-26 ENCOUNTER — PATIENT MESSAGE (OUTPATIENT)
Dept: PRIMARY CARE | Facility: CLINIC | Age: 40
End: 2025-08-26
Payer: COMMERCIAL

## 2025-08-26 DIAGNOSIS — M51.26 HERNIATED NUCLEUS PULPOSUS, L4-5 LEFT: Primary | ICD-10-CM

## 2025-08-26 PROBLEM — S83.209A CURRENT TEAR OF SEMILUNAR CARTILAGE: Status: ACTIVE | Noted: 2025-08-12

## 2025-08-26 RX ORDER — TERBINAFINE HYDROCHLORIDE 250 MG/1
250 TABLET ORAL DAILY
Qty: 90 TABLET | Refills: 0 | OUTPATIENT
Start: 2025-08-26

## 2025-08-26 ASSESSMENT — PAIN - FUNCTIONAL ASSESSMENT: PAIN_FUNCTIONAL_ASSESSMENT: 0-10

## 2025-08-28 ENCOUNTER — OFFICE VISIT (OUTPATIENT)
Dept: PODIATRY | Facility: CLINIC | Age: 40
End: 2025-08-28
Payer: COMMERCIAL

## 2025-08-28 DIAGNOSIS — M79.671 PAIN IN BOTH FEET: ICD-10-CM

## 2025-08-28 DIAGNOSIS — B35.1 ONYCHOMYCOSIS: Primary | ICD-10-CM

## 2025-08-28 DIAGNOSIS — M79.672 PAIN IN BOTH FEET: ICD-10-CM

## 2025-08-28 DIAGNOSIS — B35.3 TINEA PEDIS OF BOTH FEET: ICD-10-CM

## 2025-08-28 PROCEDURE — 99212 OFFICE O/P EST SF 10 MIN: CPT | Performed by: PODIATRIST

## 2025-08-28 PROCEDURE — 4010F ACE/ARB THERAPY RXD/TAKEN: CPT | Performed by: PODIATRIST

## 2025-09-23 ENCOUNTER — APPOINTMENT (OUTPATIENT)
Dept: BEHAVIORAL HEALTH | Facility: HOSPITAL | Age: 40
End: 2025-09-23
Payer: COMMERCIAL

## 2025-09-30 ENCOUNTER — APPOINTMENT (OUTPATIENT)
Dept: BEHAVIORAL HEALTH | Facility: CLINIC | Age: 40
End: 2025-09-30
Payer: COMMERCIAL